# Patient Record
Sex: MALE | Race: WHITE | NOT HISPANIC OR LATINO | Employment: FULL TIME | URBAN - METROPOLITAN AREA
[De-identification: names, ages, dates, MRNs, and addresses within clinical notes are randomized per-mention and may not be internally consistent; named-entity substitution may affect disease eponyms.]

---

## 2017-01-02 ENCOUNTER — OFFICE VISIT (OUTPATIENT)
Dept: URGENT CARE | Facility: PHYSICIAN GROUP | Age: 52
End: 2017-01-02
Payer: COMMERCIAL

## 2017-01-02 VITALS
SYSTOLIC BLOOD PRESSURE: 154 MMHG | TEMPERATURE: 99.7 F | WEIGHT: 248 LBS | DIASTOLIC BLOOD PRESSURE: 92 MMHG | HEART RATE: 86 BPM | OXYGEN SATURATION: 95 % | BODY MASS INDEX: 31 KG/M2 | RESPIRATION RATE: 16 BRPM

## 2017-01-02 DIAGNOSIS — I10 BENIGN HYPERTENSION: ICD-10-CM

## 2017-01-02 PROCEDURE — 99203 OFFICE O/P NEW LOW 30 MIN: CPT | Performed by: PHYSICIAN ASSISTANT

## 2017-01-02 RX ORDER — AMLODIPINE BESYLATE 10 MG/1
10 TABLET ORAL DAILY
Qty: 30 TAB | Refills: 1 | Status: SHIPPED | OUTPATIENT
Start: 2017-01-02 | End: 2017-03-22 | Stop reason: SDUPTHER

## 2017-01-02 NOTE — MR AVS SNAPSHOT
Sanjeev Charles   2017 5:45 PM   Office Visit   MRN: [id number]    Department:  [facility]   Dept Phone:  [phone]    Description:  Male : 1965   Provider:  Rachael Calero PA-C           Reason for Visit     Medication Refill medication refill amlodipine      Allergies as of 2017     No Known Allergies      You were diagnosed with     Benign hypertension   [014019]         Vital Signs     Blood Pressure Pulse Temperature Respirations Weight Oxygen Saturation    154/92 mmHg 86 37.6 °C (99.7 °F) 16 112.492 kg (248 lb) 95%    Smoking Status                   Never Smoker            Basic Information     Date Of Birth Sex Race Ethnicity Preferred Language    1965 Male White Non- English      Health Maintenance     Patient has no pending health maintenance at this time      Current Immunizations     Influenza TIV (IM) 2009  4:22 PM    Pneumococcal polysaccharide vaccine (PPSV-23) 2009  4:28 PM    Tuberculin Skin Test  Incomplete      Below and/or attached are the medications your provider expects you to take. Review all of your home medications and newly ordered medications with your provider and/or pharmacist. Follow medication instructions as directed by your provider and/or pharmacist. Please keep your medication list with you and share with your provider. Update the information when medications are discontinued, doses are changed, or new medications (including over-the-counter products) are added; and carry medication information at all times in the event of emergency situations     Allergies:  No Known Allergies          Medications  Valid as of: 2017 -  6:17 PM    Generic Name Brand Name Tablet Size Instructions for use    Albuterol   Inhale 90 mcg by mouth every four hours as needed. Take 2 puffs every 4 hours as needed         AmLODIPine Besylate (Tab) NORVASC 10 MG Take 10 mg by mouth every day.        AmLODIPine Besylate (Tab) NORVASC  10 MG Take 1 Tab by mouth every day.        GuaiFENesin   Take 600 mg by mouth 2 Times a Day. Take for 5 days         Moxifloxacin HCl (Tab) AVELOX 400 MG Take 400 mg by mouth every day. Take for 12 more days         Oseltamivir Phosphate (Cap) TAMIFLU 75 MG Take 75 mg by mouth 2 times a day. Take for 5 days         Potassium Chloride Dena CR (Tab CR) K-DUR 20 MEQ Take 1 Tab by mouth 2 times a day.        .                 Medicines prescribed today were sent to:     [facility] [address] [place] [address] [address] [id number] [facility] [address]    Phone: [phone] Fax: [phone]    Open 24 Hours?: No      Medication refill instructions:       If your prescription bottle indicates you have medication refills left, it is not necessary to call your provider’s office. Please contact your pharmacy and they will refill your medication.    If your prescription bottle indicates you do not have any refills left, you may request refills at any time through one of the following ways: The online [employer] system (except Urgent Care), by calling your provider’s office, or by asking your pharmacy to contact your provider’s office with a refill request. Medication refills are processed only during regular business hours and may not be available until the next business day. Your provider may request additional information or to have a follow-up visit with you prior to refilling your medication.   *Please Note: Medication refills are assigned a new Rx number when refilled electronically. Your pharmacy may indicate that no refills were authorized even though a new prescription for the same medication is available at the pharmacy. Please request the medicine by name with the pharmacy before contacting your provider for a refill.           [employer] Access Code: [email or web address]  Expires: 2/1/2017  6:17 PM    [employer]  A secure, online tool to manage your health information     [employer]’s [employer]® is a  secure, online tool that connects you to your personalized health information from the privacy of your home -- day or night - making it very easy for you to manage your healthcare. Once the activation process is completed, you can even access your medical information using the [employer] nemo, which is available for free in the Apple Nemo store or Google Play store.     [employer] provides the following levels of access (as shown below):   My Chart Features   [facility] Doctor [facility]  Specialists [facility]  Urgent  Care Non-[facility]  Primary Care  Doctor   Email your healthcare team securely and privately 24/7 X X X    Manage appointments: schedule your next appointment; view details of past/upcoming appointments X      Request prescription refills. X      View recent personal medical records, including lab and immunizations X X X X   View health record, including health history, allergies, medications X X X X   Read reports about your outpatient visits, procedures, consult and ER notes X X X X   See your discharge summary, which is a recap of your hospital and/or ER visit that includes your diagnosis, lab results, and care plan. X X       How to register for [employer]:  1. Go to  [email or web address].  2. Click on the Sign Up Now box, which takes you to the New Member Sign Up page. You will need to provide the following information:  a. Enter your [employer] Access Code exactly as it appears at the top of this page. (You will not need to use this code after you’ve completed the sign-up process. If you do not sign up before the expiration date, you must request a new code.)   b. Enter your date of birth.   c. Enter your home email address.   d. Click Submit, and follow the next screen’s instructions.  3. Create a [employer]finance UKt ID. This will be your [employer]o login ID and cannot be changed, so think of one that is secure and easy to remember.  4. Create a [employer] password. You can change your password at any time.  5. Enter  your Password Reset Question and Answer. This can be used at a later time if you forget your password.   6. Enter your e-mail address. This allows you to receive e-mail notifications when new information is available in [employer].  7. Click Sign Up. You can now view your health information.    For assistance activating your [employer] account, call [phone]

## 2017-01-03 NOTE — PROGRESS NOTES
Chief Complaint   Patient presents with   • Medication Refill     medication refill amlodipine       HISTORY OF PRESENT ILLNESS: Patient is a 51 y.o. male who presents today for refill on Amlodipine.  He was taking 10 mg daily.   He has been off for 6 months.  He states he has been feeling fine overall but at times feels flushed.    No chest pain, no blurred vision.  Patient states he did well on the medication he just ran out and was feeling fine and was unable to get in with Dr. Lance and also would like to establish with new PCP.     There are no active problems to display for this patient.      Allergies:Review of patient's allergies indicates no known allergies.    Current Outpatient Prescriptions Ordered in [facility]   Medication Sig Dispense Refill   • potassium chloride SA (K-DUR) 20 MEQ TBCR Take 1 Tab by mouth 2 times a day. 10 Each 11   • moxifloxacin (AVELOX) 400 MG TABS Take 400 mg by mouth every day. Take for 12 more days      • oseltamivir (TAMIFLU) 75 MG CAPS Take 75 mg by mouth 2 times a day. Take for 5 days      • amlodipine (NORVASC) 10 MG TABS Take 10 mg by mouth every day.     • GuaiFENesin (MUCINEX PO) Take 600 mg by mouth 2 Times a Day. Take for 5 days      • ALBUTEROL INH Inhale 90 mcg by mouth every four hours as needed. Take 2 puffs every 4 hours as needed        No current Epic-ordered facility-administered medications on file.       Past Medical History   Diagnosis Date   • HTN (hypertension)    • Bronchitis    • Pneumonia        Social History   Substance Use Topics   • Smoking status: Never Smoker    • Smokeless tobacco: Never Used   • Alcohol Use: Yes      Comment: 2 beer daily       No family status information on file.   History reviewed. No pertinent family history.    ROS:  Review of Systems   Constitutional: Negative for fever, chills, weight loss and malaise/fatigue.   HENT: Negative for ear pain, nosebleeds, congestion, sore throat and neck pain.    Eyes: Negative for blurred  vision.   Respiratory: Negative for SOB, cough, wheezing.   Cardiovascular: Negative for chest pain, palpitations, orthopnea and leg swelling.   Gastrointestinal: Negative for heartburn, nausea, vomiting and abdominal pain.   All other systems reviewed and are negative.       Exam:  Blood pressure 154/92, pulse 86, temperature 37.6 °C (99.7 °F), resp. rate 16, weight 112.492 kg (248 lb), SpO2 95 %.  General:  Well nourished, well developed male in NAD  Eyes: PERRLA, EOM within normal limits, no conjunctival injection, no scleral icterus, visual fields and acuity grossly intact.  Mouth: reasonable hygiene, no erythema exudates or tonsillar enlargement.  Neck: no masses, range of motion within normal limits, no tracheal deviation. No lymphadenopathy  Pulmonary: Normal respiratory effort, no wheezes, crackles, or rhonchi.  Cardiovascular: regular rate and rhythm without murmurs, rubs, or gallops.  Abdomen: Soft, nontender, nondistended. Normal bowel sounds. No hepatosplenomegaly or masses, or hernias. No rebound or guarding.  Skin: No visible rashes or lesion. Warm, pink, dry.   Extremities: no clubbing, cyanosis, or edema.  Neuro: A&O x 3. Speech normal/clear.  Normal gait.         Assessment/Plan:  1. Benign hypertension  amlodipine (NORVASC) 10 MG Tab       -renewed medication for patient.   -he will establish with PCP in the next month and understands he needs PCP to manage medications.   -recommend regular exercise, limit caffeine, alcohol, salt.   -tobacco cessation      Supportive care, differential diagnoses, and indications for immediate follow-up discussed with patient.   Pathogenesis of diagnosis discussed including typical length and natural progression.   Instructed to return to clinic or nearest emergency department for any change in condition, further concerns, or worsening of symptoms.  Patient states understanding of the plan of care and discharge instructions.  Instructed to make an appointment, for  follow up, with their primary care provider.      Rachael Calero PA-C

## 2017-03-22 ENCOUNTER — OFFICE VISIT (OUTPATIENT)
Dept: MEDICAL GROUP | Facility: PHYSICIAN GROUP | Age: 52
End: 2017-03-22
Payer: COMMERCIAL

## 2017-03-22 ENCOUNTER — HOSPITAL ENCOUNTER (OUTPATIENT)
Dept: LAB | Facility: MEDICAL CENTER | Age: 52
End: 2017-03-22
Attending: NURSE PRACTITIONER
Payer: COMMERCIAL

## 2017-03-22 VITALS
OXYGEN SATURATION: 98 % | TEMPERATURE: 97.9 F | RESPIRATION RATE: 16 BRPM | WEIGHT: 256.6 LBS | DIASTOLIC BLOOD PRESSURE: 72 MMHG | HEART RATE: 70 BPM | HEIGHT: 75 IN | SYSTOLIC BLOOD PRESSURE: 132 MMHG | BODY MASS INDEX: 31.9 KG/M2

## 2017-03-22 DIAGNOSIS — Z13.6 SCREENING FOR CARDIOVASCULAR, RESPIRATORY, AND GENITOURINARY DISEASES: ICD-10-CM

## 2017-03-22 DIAGNOSIS — E66.9 OBESITY (BMI 30-39.9): ICD-10-CM

## 2017-03-22 DIAGNOSIS — Z13.83 SCREENING FOR CARDIOVASCULAR, RESPIRATORY, AND GENITOURINARY DISEASES: ICD-10-CM

## 2017-03-22 DIAGNOSIS — Z13.89 SCREENING FOR CARDIOVASCULAR, RESPIRATORY, AND GENITOURINARY DISEASES: ICD-10-CM

## 2017-03-22 DIAGNOSIS — Z12.5 SCREENING PSA (PROSTATE SPECIFIC ANTIGEN): ICD-10-CM

## 2017-03-22 DIAGNOSIS — I10 BENIGN HYPERTENSION: ICD-10-CM

## 2017-03-22 LAB
ALBUMIN SERPL BCP-MCNC: 4.3 G/DL (ref 3.2–4.9)
ALBUMIN/GLOB SERPL: 1.8 G/DL
ALP SERPL-CCNC: 65 U/L (ref 30–99)
ALT SERPL-CCNC: 21 U/L (ref 2–50)
ANION GAP SERPL CALC-SCNC: 6 MMOL/L (ref 0–11.9)
AST SERPL-CCNC: 17 U/L (ref 12–45)
BILIRUB SERPL-MCNC: 0.7 MG/DL (ref 0.1–1.5)
BUN SERPL-MCNC: 11 MG/DL (ref 8–22)
CALCIUM SERPL-MCNC: 9.6 MG/DL (ref 8.5–10.5)
CHLORIDE SERPL-SCNC: 106 MMOL/L (ref 96–112)
CHOLEST SERPL-MCNC: 158 MG/DL (ref 100–199)
CO2 SERPL-SCNC: 27 MMOL/L (ref 20–33)
CREAT SERPL-MCNC: 0.95 MG/DL (ref 0.5–1.4)
GLOBULIN SER CALC-MCNC: 2.4 G/DL (ref 1.9–3.5)
GLUCOSE SERPL-MCNC: 89 MG/DL (ref 65–99)
HDLC SERPL-MCNC: 33 MG/DL
LDLC SERPL CALC-MCNC: 114 MG/DL
POTASSIUM SERPL-SCNC: 4.4 MMOL/L (ref 3.6–5.5)
PROT SERPL-MCNC: 6.7 G/DL (ref 6–8.2)
PSA SERPL DL<=0.01 NG/ML-MCNC: 1.09 NG/ML (ref 0–4)
SODIUM SERPL-SCNC: 139 MMOL/L (ref 135–145)
TRIGL SERPL-MCNC: 56 MG/DL (ref 0–149)

## 2017-03-22 PROCEDURE — 80061 LIPID PANEL: CPT

## 2017-03-22 PROCEDURE — 80053 COMPREHEN METABOLIC PANEL: CPT

## 2017-03-22 PROCEDURE — 36415 COLL VENOUS BLD VENIPUNCTURE: CPT

## 2017-03-22 PROCEDURE — 84153 ASSAY OF PSA TOTAL: CPT

## 2017-03-22 PROCEDURE — 99214 OFFICE O/P EST MOD 30 MIN: CPT | Performed by: NURSE PRACTITIONER

## 2017-03-22 RX ORDER — AMLODIPINE BESYLATE 10 MG/1
10 TABLET ORAL DAILY
Qty: 30 TAB | Refills: 5 | Status: SHIPPED | OUTPATIENT
Start: 2017-03-22 | End: 2017-10-13 | Stop reason: SDUPTHER

## 2017-03-22 ASSESSMENT — PATIENT HEALTH QUESTIONNAIRE - PHQ9: CLINICAL INTERPRETATION OF PHQ2 SCORE: 0

## 2017-03-22 NOTE — PROGRESS NOTES
Subjective:     Chief Complaint   Patient presents with   • Establish Care   • Medication Refill     Amlodipine        HPI:  Sanjeev Charles is a 51 y.o. male here today to discuss the following:    Obesity (BMI 30-39.9)  Patient is obese with a BMI of 32.07. He is currently out of work and not effective is normal, but states he'll be going back to work soon he is just in between jobs.    Benign hypertension  Chronic condition: Patient has been treated for hypertension for many years.He  is currently taking Norvasc 10 mg. He has been out of medication for 2 weeks and is questioning whether or not he needs to be on the medication. He denies any chest pain, diaphoresis, shortness of breath, headaches, dizziness or blurred vision.          Current medicines (including changes today)  Current Outpatient Prescriptions   Medication Sig Dispense Refill   • amlodipine (NORVASC) 10 MG Tab Take 1 Tab by mouth every day. 30 Tab 5   • potassium chloride SA (K-DUR) 20 MEQ TBCR Take 1 Tab by mouth 2 times a day. 10 Each 11   • moxifloxacin (AVELOX) 400 MG TABS Take 400 mg by mouth every day. Take for 12 more days      • oseltamivir (TAMIFLU) 75 MG CAPS Take 75 mg by mouth 2 times a day. Take for 5 days      • amlodipine (NORVASC) 10 MG TABS Take 10 mg by mouth every day.     • GuaiFENesin (MUCINEX PO) Take 600 mg by mouth 2 Times a Day. Take for 5 days      • ALBUTEROL INH Inhale 90 mcg by mouth every four hours as needed. Take 2 puffs every 4 hours as needed        No current facility-administered medications for this visit.       He  has a past medical history of HTN (hypertension); Bronchitis; and Pneumonia. He also has no past medical history of Unspecified hemorrhagic conditions (CMS-HCC), Personal history of venous thrombosis and embolism, Cancer (CMS-HCC), Diabetes, Infectious disease, Unspecified disorder of thyroid, Psychiatric problem, Glaucoma, CATARACT, Stroke (CMS-[facility]), Seizure (CMS-HCC), Congestive heart failure  "(CMS-[facility]), Myocardial infarct (CMS-HCC), Arrhythmia, Pacemaker, Angina, Heart valve disease, Heart murmur, Rheumatic fever, ASTHMA, COPD, Jaundice, Indigestion, Unspecified urinary incontinence, Renal disorder, Dialysis, Other specified symptom associated with female genital organs, Arthritis, or Backpain.    ROS   Review of Systems   Constitutional: Negative for fever, chills, weight loss and malaise/fatigue.   HENT: Negative for ear pain, nosebleeds, congestion, sore throat and neck pain.    Respiratory: Negative for cough, sputum production, shortness of breath and wheezing.    Cardiovascular: Negative for chest pain, palpitations,  and leg swelling.   Gastrointestinal: Negative for heartburn, nausea, vomiting, diarrhea and abdominal pain.   Neurological: Negative for dizziness, tingling, tremors, sensory change, focal weakness and headaches.   Psychiatric/Behavioral: Negative for depression, anxiety, suicidal ideas, insomnia and memory loss.    All other systems reviewed and are negative except as in HPI.     Objective:   Physical Exam:  Blood pressure 132/72, pulse 70, temperature 36.6 °C (97.9 °F), resp. rate 16, height 1.905 m (6' 3\"), weight 116.393 kg (256 lb 9.6 oz), SpO2 98 %. Body mass index is 32.07 kg/(m^2).   Physical Exam:  Alert, oriented in no acute distress.  Eye contact is good, speech goal directed, affect calm  HEENT: conjunctiva non-injected, sclera non-icteric.  Pinna normal.   Neck No adenopathy or masses in the neck or supraclavicular regions.  Lungs: clear to auscultation bilaterally with good excursion.  CV: regular rate and rhythm.   Abdomen: soft, nontender, No CVAT. Normal bowel sounds.  Ext: no edema, color normal, vascularity normal, temperature normal  MS: Normal gait and station    Assessment and Plan:   The following treatment plan was discussed   1. Benign hypertension  amlodipine (NORVASC) 10 MG Tab    Continue Norvasc.   2. Screening for cardiovascular, respiratory, and " genitourinary diseases  COMP METABOLIC PANEL    LIPID PROFILE    Labs ordered.   3. Screening PSA (prostate specific antigen)  PROSTATE SPECIFIC AG SCREENING    Labs ordered.   4. Obesity (BMI 30-39.9)  Patient identified as having weight management issue.  Appropriate orders and counseling given.    Discussed diet and provided handout       Followup: Return in about 1 year (around 3/22/2018) for HTN/Lipid.   Please note that this dictation was created using voice recognition software. I have made every reasonable attempt to correct obvious errors, but I expect that there are errors of grammar and possibly content that I did not discover before finalizing the note.

## 2017-03-22 NOTE — ASSESSMENT & PLAN NOTE
Patient is obese with a BMI of 32.07. He is currently out of work and not effective is normal, but states he'll be going back to work soon he is just in between jobs.

## 2017-03-22 NOTE — PATIENT INSTRUCTIONS
For adults, the American Heart Association recommends:    Consume a dietary pattern that emphasizes intake of vegetables, fruits, and whole grains; includes low-fat dairy products, poultry, fish, legumes, nontropical vegetable oils and nuts; and limits intake of sweets, sugar-sweetened beverages and red meats.  Look up DASH diet for more information.    Aim for a dietary pattern that achieves 5% to 6% of calories from saturated fat.     Reduce/eliminate percent of calories from trans fat     Consume no more than 2,400 mg of sodium/day     Engage in aerobic physical activity, 3 to 4 sessions a week, lasting on average 40 minutes per session, and involving moderate-to-vigorous intensity physical activity.           Managing Your High Blood Pressure  Blood pressure is a measurement of how forceful your blood is pressing against the walls of the arteries. Arteries are muscular tubes within the circulatory system. Blood pressure does not stay the same. Blood pressure rises when you are active, excited, or nervous; and it lowers during sleep and relaxation. If the numbers measuring your blood pressure stay above normal most of the time, you are at risk for health problems. High blood pressure (hypertension) is a long-term (chronic) condition in which blood pressure is elevated.  A blood pressure reading is recorded as two numbers, such as 120 over 80 (or 120/80). The first, higher number is called the systolic pressure. It is a measure of the pressure in your arteries as the heart beats. The second, lower number is called the diastolic pressure. It is a measure of the pressure in your arteries as the heart relaxes between beats.   Keeping your blood pressure in a normal range is important to your overall health and prevention of health problems, such as heart disease and stroke. When your blood pressure is uncontrolled, your heart has to work harder than normal. High blood pressure is a very common condition in adults  because blood pressure tends to rise with age. Men and women are equally likely to have hypertension but at different times in life. Before age 45, men are more likely to have hypertension. After 65 years of age, women are more likely to have it. Hypertension is especially common in  Americans. This condition often has no signs or symptoms. The cause of the condition is usually not known. Your caregiver can help you come up with a plan to keep your blood pressure in a normal, healthy range.  BLOOD PRESSURE STAGES  Blood pressure is classified into four stages: normal, prehypertension, stage 1, and stage 2. Your blood pressure reading will be used to determine what type of treatment, if any, is necessary. Appropriate treatment options are tied to these four stages:   Normal  · Systolic pressure (mm Hg): below 120.  · Diastolic pressure (mm Hg): below 80.  Prehypertension  · Systolic pressure (mm Hg): 120 to 139.  · Diastolic pressure (mm Hg): 80 to 89.  Stage 1  · Systolic pressure (mm Hg): 140 to 159.  · Diastolic pressure (mm Hg): 90 to 99.  Stage 2  · Systolic pressure (mm Hg): 160 or above.  · Diastolic pressure (mm Hg): 100 or above.  RISKS RELATED TO HIGH BLOOD PRESSURE  Managing your blood pressure is an important responsibility. Uncontrolled high blood pressure can lead to:  · A heart attack.  · A stroke.  · A weakened blood vessel (aneurysm).  · Heart failure.  · Kidney damage.  · Eye damage.  · Metabolic syndrome.  · Memory and concentration problems.  HOW TO MANAGE YOUR BLOOD PRESSURE  Blood pressure can be managed effectively with lifestyle changes and medicines (if needed). Your caregiver will help you come up with a plan to bring your blood pressure within a normal range. Your plan should include the following:  Education  · Read all information provided by your caregivers about how to control blood pressure.  · Educate yourself on the latest guidelines and treatment recommendations. New research  is always being done to further define the risks and treatments for high blood pressure.  Lifestyle changes  · Control your weight.  · Avoid smoking.  · Stay physically active.  · Reduce the amount of salt in your diet.  · Reduce stress.  · Control any chronic conditions, such as high cholesterol or diabetes.  · Reduce your alcohol intake.  Medicines  · Several medicines (antihypertensive medicines) are available, if needed, to bring blood pressure within a normal range.  Communication  · Review all the medicines you take with your caregiver because there may be side effects or interactions.  · Talk with your caregiver about your diet, exercise habits, and other lifestyle factors that may be contributing to high blood pressure.  · See your caregiver regularly. Your caregiver can help you create and adjust your plan for managing high blood pressure.  RECOMMENDATIONS FOR TREATMENT AND FOLLOW-UP   The following recommendations are based on current guidelines for managing high blood pressure in nonpregnant adults. Use these recommendations to identify the proper follow-up period or treatment option based on your blood pressure reading. You can discuss these options with your caregiver.  · Systolic pressure of 120 to 139 or diastolic pressure of 80 to 89: Follow up with your caregiver as directed.  · Systolic pressure of 140 to 160 or diastolic pressure of 90 to 100: Follow up with your caregiver within 2 months.  · Systolic pressure above 160 or diastolic pressure above 100: Follow up with your caregiver within 1 month.  · Systolic pressure above 180 or diastolic pressure above 110: Consider antihypertensive therapy; follow up with your caregiver within 1 week.  · Systolic pressure above 200 or diastolic pressure above 120: Begin antihypertensive therapy; follow up with your caregiver within 1 week.     This information is not intended to replace advice given to you by your health care provider. Make sure you discuss  any questions you have with your health care provider.     Document Released: 09/11/2013 Document Reviewed: 09/11/2013  [employer] Interactive Patient Education ©2016 [employer]

## 2017-03-22 NOTE — ASSESSMENT & PLAN NOTE
Chronic condition: Patient has been treated for hypertension for many years.He  is currently taking Norvasc 10 mg. He has been out of medication for 2 weeks and is questioning whether or not he needs to be on the medication. He denies any chest pain, diaphoresis, shortness of breath, headaches, dizziness or blurred vision.

## 2017-10-13 DIAGNOSIS — I10 BENIGN HYPERTENSION: ICD-10-CM

## 2017-10-13 RX ORDER — AMLODIPINE BESYLATE 10 MG/1
TABLET ORAL
Qty: 90 TAB | Refills: 0 | Status: SHIPPED | OUTPATIENT
Start: 2017-10-13 | End: 2018-01-29 | Stop reason: SDUPTHER

## 2017-10-13 NOTE — TELEPHONE ENCOUNTER
Refill X 3 months, sent to pharmacy.  Pt not seen in the last 6 months per protocol.   Lab Results   Component Value Date/Time    SODIUM 139 03/22/2017 09:11 AM    POTASSIUM 4.4 03/22/2017 09:11 AM    CHLORIDE 106 03/22/2017 09:11 AM    CO2 27 03/22/2017 09:11 AM    GLUCOSE 89 03/22/2017 09:11 AM    BUN 11 03/22/2017 09:11 AM    CREATININE 0.95 03/22/2017 09:11 AM       Silverio Hays, SarahiD

## 2017-10-13 NOTE — TELEPHONE ENCOUNTER
Was the patient seen in the last year in this department? Yes     Does patient have an active prescription for medications requested? No     Received Request Via: Pharmacy      Pt met protocol?: No Last OV 03/2017   BP Readings from Last 1 Encounters:   03/22/17 132/72

## 2017-11-29 ENCOUNTER — TELEPHONE (OUTPATIENT)
Dept: MEDICAL GROUP | Facility: PHYSICIAN GROUP | Age: 52
End: 2017-11-29

## 2017-11-29 NOTE — TELEPHONE ENCOUNTER
Pt came in to the office today with complete disrespect. He went up to the check in desk with Mary and told her he made an appointment for today and was checking in. He stated he made the appointment through my-chart and since his wife's phone broke he wasn't able to show the confirmation.   Mary had came back to discuss this with me- I went up front to talk the pt to find out what was going on. The Pt stated he was here for an appointment that he made through my-chart, he was told to come in within 3 months for a medication follow up. That he took the entire day off and I was going to reimburse him for his $400 he missed out on taking the entire day off for his appointment. I stated I was not going to reimburse him and that there was no appointment scheduled. He stated he would just like to talk to Gricelda and say hi and let her know he was doing fine and well, I replied I would inform her of the message. He continued with an attitude and saying what a waist of time this way and that it was our mistake and he would be reimbursed. He asked for the billing department information so he would be reimbursed. He started to yell at me so I said I was not going to continue with this conversation since he was being so disrespectful and that  he could talk to my manager for further inquiries.   Once I had walked away I was told the Pt used inapropiate language and called me an effin-B(with the actual language).     Just an MAGDI

## 2018-01-29 DIAGNOSIS — I10 BENIGN HYPERTENSION: ICD-10-CM

## 2018-01-30 RX ORDER — AMLODIPINE BESYLATE 10 MG/1
TABLET ORAL
Qty: 90 TAB | Refills: 0 | Status: SHIPPED | OUTPATIENT
Start: 2018-01-30 | End: 2018-05-23 | Stop reason: SDUPTHER

## 2018-01-30 NOTE — TELEPHONE ENCOUNTER
Last seen by PCP 3/17. Will send 3 months to pharmacy. Patient is due for an appointment. Please schedule.

## 2018-01-30 NOTE — TELEPHONE ENCOUNTER
*PT NEEDS TO ESTABLISH WITH NEW PCP, MAKE APPT, AND GET LABS DONE SOON*  Was the patient seen in the last year in this department? Yes     Does patient have an active prescription for medications requested? No     Received Request Via: Pharmacy      Pt met protocol?: Yes    LAST OV 03/22/2017    BP Readings from Last 1 Encounters:   03/22/17 132/72     Lab Results   Component Value Date/Time    CHOLSTRLTOT 158 03/22/2017 09:11 AM     (H) 03/22/2017 09:11 AM    HDL 33 (A) 03/22/2017 09:11 AM    TRIGLYCERIDE 56 03/22/2017 09:11 AM       Lab Results   Component Value Date/Time    SODIUM 139 03/22/2017 09:11 AM    POTASSIUM 4.4 03/22/2017 09:11 AM    CHLORIDE 106 03/22/2017 09:11 AM    CO2 27 03/22/2017 09:11 AM    GLUCOSE 89 03/22/2017 09:11 AM    BUN 11 03/22/2017 09:11 AM    CREATININE 0.95 03/22/2017 09:11 AM     Lab Results   Component Value Date/Time    ALKPHOSPHAT 65 03/22/2017 09:11 AM    ASTSGOT 17 03/22/2017 09:11 AM    ALTSGPT 21 03/22/2017 09:11 AM    TBILIRUBIN 0.7 03/22/2017 09:11 AM

## 2018-07-23 ENCOUNTER — TELEPHONE (OUTPATIENT)
Dept: MEDICAL GROUP | Facility: PHYSICIAN GROUP | Age: 53
End: 2018-07-23

## 2018-07-23 NOTE — TELEPHONE ENCOUNTER
----- Message -----   From: Sanjeev Charles   Sent: 7/21/2018   7:11 AM   To: Dwayne Rivas   Subject: Appointment Request                                Topic: Technical Quality      I need to schedule an appointment ASAP for my blood pressure medication. I’m available after 3:00 weekdays.  Please advise. I’m completely out of medicine.    Thank You!      Sanjeev

## 2018-07-30 ENCOUNTER — OFFICE VISIT (OUTPATIENT)
Dept: URGENT CARE | Facility: PHYSICIAN GROUP | Age: 53
End: 2018-07-30
Payer: COMMERCIAL

## 2018-07-30 VITALS
HEART RATE: 76 BPM | SYSTOLIC BLOOD PRESSURE: 148 MMHG | DIASTOLIC BLOOD PRESSURE: 100 MMHG | BODY MASS INDEX: 31.95 KG/M2 | TEMPERATURE: 98.9 F | OXYGEN SATURATION: 97 % | RESPIRATION RATE: 16 BRPM | HEIGHT: 75 IN | WEIGHT: 257 LBS

## 2018-07-30 DIAGNOSIS — I48.91 ATRIAL FIBRILLATION WITH RVR (HCC): ICD-10-CM

## 2018-07-30 DIAGNOSIS — I10 BENIGN HYPERTENSION: ICD-10-CM

## 2018-07-30 PROCEDURE — 99214 OFFICE O/P EST MOD 30 MIN: CPT | Performed by: FAMILY MEDICINE

## 2018-07-30 RX ORDER — AMLODIPINE BESYLATE 10 MG/1
10 TABLET ORAL DAILY
Qty: 30 TAB | Refills: 0 | Status: SHIPPED | OUTPATIENT
Start: 2018-07-30 | End: 2018-08-29

## 2018-07-30 RX ORDER — AMLODIPINE BESYLATE 5 MG/1
5 TABLET ORAL DAILY
COMMUNITY
End: 2019-03-25

## 2018-07-31 ASSESSMENT — ENCOUNTER SYMPTOMS
COUGH: 0
PALPITATIONS: 0
CHILLS: 0
WEIGHT LOSS: 0
SPUTUM PRODUCTION: 0
DIAPHORESIS: 0
FEVER: 0
NAUSEA: 0
WHEEZING: 0
HEMOPTYSIS: 0

## 2018-07-31 NOTE — PROGRESS NOTES
"Subjective:      Sanjeev Charles is a 52 y.o. male who presents with Shortness of Breath (SOB, been without blood pressure med's for, eye twitching [id number] )            Patient with past medical history of hypertension presents for refill of medication.  He has been controlled on 10 mg of amlodipine.  He does get intermittent mild leg swelling.  Over the last 3 weeks he has had intermittent shortness of breath.  No chest pain.  He has no past medical history of cardiac problems.  No orthopnea.  Symptoms are moderate severity.  No aggravating or alleviating factors.        Review of Systems   Constitutional: Negative for chills, diaphoresis, fever, malaise/fatigue and weight loss.   Respiratory: Negative for cough, hemoptysis, sputum production and wheezing.    Cardiovascular: Negative for palpitations.   Gastrointestinal: Negative for nausea.     .  Medications, Allergies, and current problem list reviewed today in Epic       Objective:     /100   Pulse 76   Temp 37.2 °C (98.9 °F)   Resp 16   Ht 1.905 m (6' 3\")   Wt 116.6 kg (257 lb)   SpO2 97%   BMI 32.12 kg/m²      Physical Exam   Constitutional: He is oriented to person, place, and time. He appears well-developed and well-nourished. No distress.   HENT:   Head: Atraumatic.   Eyes: Conjunctivae are normal.   Neck: Neck supple. No JVD present.   Cardiovascular:   Rapid, irregularly irregular   Pulmonary/Chest: Effort normal and breath sounds normal. He has no wheezes.   Musculoskeletal: He exhibits edema ( Trace). He exhibits no tenderness.   Neurological: He is alert and oriented to person, place, and time.   Skin: Skin is warm and dry. No rash noted.               Assessment/Plan:   Ekg: Atrial fibrillation rate 120s    1. Benign hypertension  amLODIPine (NORVASC) 10 MG Tab    EKG   2. Atrial fibrillation with RVR (HCC)  REFERRAL TO CARDIOLOGY     AMA/Refusal of Treatment     Differential diagnosis, natural history, supportive care, and indications " for immediate follow-up discussed at length.     Case was discussed with  To cardiology.  He recommends holding anticoagulation until patient follows up in cardiology office given unknown renal status and concern about compliance.

## 2018-08-29 ENCOUNTER — TELEPHONE (OUTPATIENT)
Dept: MEDICAL GROUP | Facility: PHYSICIAN GROUP | Age: 53
End: 2018-08-29

## 2018-08-29 DIAGNOSIS — I10 BENIGN HYPERTENSION: ICD-10-CM

## 2018-08-29 RX ORDER — AMLODIPINE BESYLATE 10 MG/1
TABLET ORAL
Qty: 90 TAB | Refills: 0 | Status: SHIPPED | OUTPATIENT
Start: 2018-08-29 | End: 2019-03-22 | Stop reason: SDUPTHER

## 2018-08-30 ENCOUNTER — TELEPHONE (OUTPATIENT)
Dept: URGENT CARE | Facility: PHYSICIAN GROUP | Age: 53
End: 2018-08-30

## 2018-08-30 NOTE — TELEPHONE ENCOUNTER
----- Message from Blayne Kulkarni sent at 8/29/2018 12:54 PM PDT -----  Hi Dr. Wade,    Would you be willing to see this patient tomorrow for a refill on Hypertension medication? I will schedule him an appointment to establish care but he says he is down to 3 pills and had to cancel his establishing appointment yesterday. He has not been seen in over a year. Please advise.

## 2018-10-28 ENCOUNTER — HOSPITAL ENCOUNTER (EMERGENCY)
Facility: MEDICAL CENTER | Age: 53
End: 2018-10-28
Attending: EMERGENCY MEDICINE
Payer: COMMERCIAL

## 2018-10-28 VITALS
DIASTOLIC BLOOD PRESSURE: 94 MMHG | SYSTOLIC BLOOD PRESSURE: 135 MMHG | BODY MASS INDEX: 29.84 KG/M2 | WEIGHT: 240 LBS | HEIGHT: 75 IN | HEART RATE: 91 BPM | OXYGEN SATURATION: 96 % | RESPIRATION RATE: 18 BRPM | TEMPERATURE: 97.3 F
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
## 2018-10-28 DIAGNOSIS — Z00.8 MEDICAL CLEARANCE FOR INCARCERATION: ICD-10-CM

## 2018-10-28 PROCEDURE — 99284 EMERGENCY DEPT VISIT MOD MDM: CPT

## 2018-10-28 ASSESSMENT — PAIN SCALES - GENERAL: PAINLEVEL_OUTOF10: 0

## 2018-10-28 NOTE — ED NOTES
Reviewed discharge instructions, pt verbalized understanding of instructions. Pt medically cleared for d/c.
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
## 2019-03-22 DIAGNOSIS — I10 BENIGN HYPERTENSION: ICD-10-CM

## 2019-03-22 NOTE — TELEPHONE ENCOUNTER
Was the patient seen in the last year in this department? No     Does patient have an active prescription for medications requested? No     Received Request Via: Pharmacy      Pt met protocol?: No, bp as of 7/30/19 148/100, appt to est with Héctor Diaz 5/19

## 2019-03-23 RX ORDER — AMLODIPINE BESYLATE 10 MG/1
TABLET ORAL
Qty: 90 TAB | Refills: 0 | Status: SHIPPED | OUTPATIENT
Start: 2019-03-23 | End: 2019-03-25

## 2019-03-25 ENCOUNTER — OFFICE VISIT (OUTPATIENT)
Dept: URGENT CARE | Facility: CLINIC | Age: 54
End: 2019-03-25
Payer: COMMERCIAL

## 2019-03-25 VITALS
DIASTOLIC BLOOD PRESSURE: 96 MMHG | WEIGHT: 257 LBS | HEART RATE: 58 BPM | BODY MASS INDEX: 31.95 KG/M2 | SYSTOLIC BLOOD PRESSURE: 140 MMHG | RESPIRATION RATE: 16 BRPM | OXYGEN SATURATION: 97 % | HEIGHT: 75 IN | TEMPERATURE: 97.8 F

## 2019-03-25 DIAGNOSIS — I10 ESSENTIAL HYPERTENSION: ICD-10-CM

## 2019-03-25 DIAGNOSIS — J22 LRTI (LOWER RESPIRATORY TRACT INFECTION): ICD-10-CM

## 2019-03-25 PROCEDURE — 99214 OFFICE O/P EST MOD 30 MIN: CPT | Performed by: FAMILY MEDICINE

## 2019-03-25 RX ORDER — AMLODIPINE BESYLATE 10 MG/1
10 TABLET ORAL DAILY
Qty: 60 TAB | Refills: 0 | Status: SHIPPED | OUTPATIENT
Start: 2019-03-25 | End: 2019-05-06 | Stop reason: SDUPTHER

## 2019-03-25 RX ORDER — AZITHROMYCIN 250 MG/1
TABLET, FILM COATED ORAL
Qty: 6 TAB | Refills: 0 | Status: SHIPPED | OUTPATIENT
Start: 2019-03-25 | End: 2019-05-06

## 2019-03-25 ASSESSMENT — ENCOUNTER SYMPTOMS
SHORTNESS OF BREATH: 1
VOMITING: 0
WHEEZING: 0
FEVER: 0
LEG PAIN: 0

## 2019-03-25 NOTE — PROGRESS NOTES
"Subjective:   Sanjeev Charles is a 53 y.o. male who presents for Shortness of Breath (x1 day, thinks it might be because he hasn't taken BP meds in 3 months)        Shortness of Breath   This is a new problem. The current episode started yesterday. The problem occurs constantly. The problem has been gradually worsening. Pertinent negatives include no fever, leg pain, vomiting or wheezing.     Review of Systems   Constitutional: Negative for fever.   Respiratory: Positive for shortness of breath. Negative for wheezing.    Gastrointestinal: Negative for vomiting.     No Known Allergies   Objective:   /96   Pulse (!) 58   Temp 36.6 °C (97.8 °F)   Resp 16   Ht 1.905 m (6' 3\")   Wt 116.6 kg (257 lb)   SpO2 97%   BMI 32.12 kg/m²   Physical Exam   Constitutional: He is oriented to person, place, and time. He appears well-developed and well-nourished. No distress.   HENT:   Head: Normocephalic and atraumatic.   Eyes: Pupils are equal, round, and reactive to light. Conjunctivae and EOM are normal.   Cardiovascular: Normal rate and regular rhythm.    No murmur heard.  Pulmonary/Chest: Effort normal. No respiratory distress. He has wheezes. He has no rales.   Abdominal: Soft. He exhibits no distension. There is no tenderness.   Neurological: He is alert and oriented to person, place, and time. He has normal reflexes. No sensory deficit.   Skin: Skin is warm and dry.   Psychiatric: He has a normal mood and affect.         Assessment/Plan:   1. LRTI (lower respiratory tract infection)  - azithromycin (ZITHROMAX) 250 MG Tab; Take 2 tablets by mouth on day one. Take one tablet by mouth the remaining days until gone  Dispense: 6 Tab; Refill: 0    2. Essential hypertension  - amLODIPine (NORVASC) 10 MG Tab; Take 1 Tab by mouth every day.  Dispense: 60 Tab; Refill: 0    Differential diagnosis, natural history, supportive care, and indications for immediate follow-up discussed.       "

## 2019-03-25 NOTE — PATIENT INSTRUCTIONS

## 2019-05-06 ENCOUNTER — OFFICE VISIT (OUTPATIENT)
Dept: MEDICAL GROUP | Facility: PHYSICIAN GROUP | Age: 54
End: 2019-05-06
Payer: COMMERCIAL

## 2019-05-06 ENCOUNTER — HOSPITAL ENCOUNTER (OUTPATIENT)
Dept: LAB | Facility: MEDICAL CENTER | Age: 54
End: 2019-05-06
Attending: FAMILY MEDICINE
Payer: COMMERCIAL

## 2019-05-06 VITALS
DIASTOLIC BLOOD PRESSURE: 84 MMHG | HEIGHT: 75 IN | WEIGHT: 232 LBS | HEART RATE: 68 BPM | TEMPERATURE: 98.2 F | RESPIRATION RATE: 12 BRPM | OXYGEN SATURATION: 97 % | BODY MASS INDEX: 28.85 KG/M2 | SYSTOLIC BLOOD PRESSURE: 126 MMHG

## 2019-05-06 DIAGNOSIS — E53.8 VITAMIN B12 DEFICIENCY: ICD-10-CM

## 2019-05-06 DIAGNOSIS — I48.91 ATRIAL FIBRILLATION, UNSPECIFIED TYPE (HCC): ICD-10-CM

## 2019-05-06 DIAGNOSIS — Z78.9 ALCOHOL USE: ICD-10-CM

## 2019-05-06 DIAGNOSIS — I10 ESSENTIAL HYPERTENSION: ICD-10-CM

## 2019-05-06 DIAGNOSIS — Z51.81 MEDICATION MONITORING ENCOUNTER: ICD-10-CM

## 2019-05-06 DIAGNOSIS — G47.30 SLEEP APNEA, UNSPECIFIED TYPE: ICD-10-CM

## 2019-05-06 DIAGNOSIS — R73.9 HYPERGLYCEMIA: ICD-10-CM

## 2019-05-06 DIAGNOSIS — E78.5 DYSLIPIDEMIA: ICD-10-CM

## 2019-05-06 DIAGNOSIS — Z11.3 ROUTINE SCREENING FOR STI (SEXUALLY TRANSMITTED INFECTION): ICD-10-CM

## 2019-05-06 DIAGNOSIS — R53.83 FATIGUE, UNSPECIFIED TYPE: ICD-10-CM

## 2019-05-06 DIAGNOSIS — E55.9 VITAMIN D DEFICIENCY: ICD-10-CM

## 2019-05-06 DIAGNOSIS — I49.9 IRREGULAR HEART BEAT: ICD-10-CM

## 2019-05-06 DIAGNOSIS — Z23 NEED FOR VACCINATION: ICD-10-CM

## 2019-05-06 DIAGNOSIS — F17.200 SMOKER: ICD-10-CM

## 2019-05-06 DIAGNOSIS — I10 BENIGN HYPERTENSION: ICD-10-CM

## 2019-05-06 PROBLEM — F10.90 ALCOHOL USE: Status: ACTIVE | Noted: 2019-05-06

## 2019-05-06 PROBLEM — E66.9 OBESITY (BMI 30-39.9): Status: RESOLVED | Noted: 2017-03-22 | Resolved: 2019-05-06

## 2019-05-06 LAB
25(OH)D3 SERPL-MCNC: 20 NG/ML (ref 30–100)
ALBUMIN SERPL BCP-MCNC: 4.3 G/DL (ref 3.2–4.9)
ALBUMIN/GLOB SERPL: 1.9 G/DL
ALP SERPL-CCNC: 79 U/L (ref 30–99)
ALT SERPL-CCNC: 21 U/L (ref 2–50)
ANION GAP SERPL CALC-SCNC: 9 MMOL/L (ref 0–11.9)
AST SERPL-CCNC: 24 U/L (ref 12–45)
BASOPHILS # BLD AUTO: 0.5 % (ref 0–1.8)
BASOPHILS # BLD: 0.04 K/UL (ref 0–0.12)
BILIRUB SERPL-MCNC: 1.1 MG/DL (ref 0.1–1.5)
BUN SERPL-MCNC: 14 MG/DL (ref 8–22)
CALCIUM SERPL-MCNC: 9.2 MG/DL (ref 8.5–10.5)
CHLORIDE SERPL-SCNC: 106 MMOL/L (ref 96–112)
CHOLEST SERPL-MCNC: 197 MG/DL (ref 100–199)
CO2 SERPL-SCNC: 24 MMOL/L (ref 20–33)
CREAT SERPL-MCNC: 0.95 MG/DL (ref 0.5–1.4)
EOSINOPHIL # BLD AUTO: 0.27 K/UL (ref 0–0.51)
EOSINOPHIL NFR BLD: 3.3 % (ref 0–6.9)
ERYTHROCYTE [DISTWIDTH] IN BLOOD BY AUTOMATED COUNT: 45.9 FL (ref 35.9–50)
FASTING STATUS PATIENT QL REPORTED: NORMAL
GLOBULIN SER CALC-MCNC: 2.3 G/DL (ref 1.9–3.5)
GLUCOSE SERPL-MCNC: 96 MG/DL (ref 65–99)
HCT VFR BLD AUTO: 51 % (ref 42–52)
HDLC SERPL-MCNC: 61 MG/DL
HGB BLD-MCNC: 16.7 G/DL (ref 14–18)
IMM GRANULOCYTES # BLD AUTO: 0.03 K/UL (ref 0–0.11)
IMM GRANULOCYTES NFR BLD AUTO: 0.4 % (ref 0–0.9)
LDLC SERPL CALC-MCNC: 118 MG/DL
LYMPHOCYTES # BLD AUTO: 2.61 K/UL (ref 1–4.8)
LYMPHOCYTES NFR BLD: 31.6 % (ref 22–41)
MCH RBC QN AUTO: 31 PG (ref 27–33)
MCHC RBC AUTO-ENTMCNC: 32.7 G/DL (ref 33.7–35.3)
MCV RBC AUTO: 94.8 FL (ref 81.4–97.8)
MONOCYTES # BLD AUTO: 0.5 K/UL (ref 0–0.85)
MONOCYTES NFR BLD AUTO: 6 % (ref 0–13.4)
NEUTROPHILS # BLD AUTO: 4.82 K/UL (ref 1.82–7.42)
NEUTROPHILS NFR BLD: 58.2 % (ref 44–72)
NRBC # BLD AUTO: 0 K/UL
NRBC BLD-RTO: 0 /100 WBC
PLATELET # BLD AUTO: 222 K/UL (ref 164–446)
PMV BLD AUTO: 10.5 FL (ref 9–12.9)
POTASSIUM SERPL-SCNC: 3.6 MMOL/L (ref 3.6–5.5)
PROT SERPL-MCNC: 6.6 G/DL (ref 6–8.2)
RBC # BLD AUTO: 5.38 M/UL (ref 4.7–6.1)
SODIUM SERPL-SCNC: 139 MMOL/L (ref 135–145)
TRIGL SERPL-MCNC: 92 MG/DL (ref 0–149)
TSH SERPL DL<=0.005 MIU/L-ACNC: 0.94 UIU/ML (ref 0.38–5.33)
VIT B12 SERPL-MCNC: 236 PG/ML (ref 211–911)
WBC # BLD AUTO: 8.3 K/UL (ref 4.8–10.8)

## 2019-05-06 PROCEDURE — 87389 HIV-1 AG W/HIV-1&-2 AB AG IA: CPT

## 2019-05-06 PROCEDURE — 90471 IMMUNIZATION ADMIN: CPT | Performed by: FAMILY MEDICINE

## 2019-05-06 PROCEDURE — 36415 COLL VENOUS BLD VENIPUNCTURE: CPT

## 2019-05-06 PROCEDURE — 86780 TREPONEMA PALLIDUM: CPT

## 2019-05-06 PROCEDURE — 83036 HEMOGLOBIN GLYCOSYLATED A1C: CPT

## 2019-05-06 PROCEDURE — 93000 ELECTROCARDIOGRAM COMPLETE: CPT | Performed by: FAMILY MEDICINE

## 2019-05-06 PROCEDURE — 82607 VITAMIN B-12: CPT

## 2019-05-06 PROCEDURE — 82306 VITAMIN D 25 HYDROXY: CPT

## 2019-05-06 PROCEDURE — 80074 ACUTE HEPATITIS PANEL: CPT

## 2019-05-06 PROCEDURE — 85025 COMPLETE CBC W/AUTO DIFF WBC: CPT

## 2019-05-06 PROCEDURE — 80053 COMPREHEN METABOLIC PANEL: CPT

## 2019-05-06 PROCEDURE — 90715 TDAP VACCINE 7 YRS/> IM: CPT | Performed by: FAMILY MEDICINE

## 2019-05-06 PROCEDURE — 87591 N.GONORRHOEAE DNA AMP PROB: CPT

## 2019-05-06 PROCEDURE — 80061 LIPID PANEL: CPT

## 2019-05-06 PROCEDURE — 87522 HEPATITIS C REVRS TRNSCRPJ: CPT

## 2019-05-06 PROCEDURE — 99215 OFFICE O/P EST HI 40 MIN: CPT | Mod: 25 | Performed by: FAMILY MEDICINE

## 2019-05-06 PROCEDURE — 87491 CHLMYD TRACH DNA AMP PROBE: CPT

## 2019-05-06 PROCEDURE — 84443 ASSAY THYROID STIM HORMONE: CPT

## 2019-05-06 RX ORDER — AMLODIPINE BESYLATE 10 MG/1
10 TABLET ORAL DAILY
Qty: 60 TAB | Refills: 0 | Status: SHIPPED | OUTPATIENT
Start: 2019-05-06 | End: 2019-06-06

## 2019-05-06 ASSESSMENT — PATIENT HEALTH QUESTIONNAIRE - PHQ9: CLINICAL INTERPRETATION OF PHQ2 SCORE: 0
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
## 2019-05-06 NOTE — LETTER
[address] [address]     May 6, 2019    Patient: Sanjeev Charles   YOB: 1965   Date of Visit: 5/6/2019       To Whom It May Concern:    Sanjeev Charles was seen and treated in our department on 5/6/2019.     Sincerely,     Gwendolyn [facility]

## 2019-05-06 NOTE — PATIENT INSTRUCTIONS
Diagnoses and all orders for this visit:    Benign hypertension  -     Misc. Devices Misc; 1 Kit by Does not apply route every day.  -     US-RUQ; Future  -     REFERRAL TO CARDIOLOGY  -     EC-ECHOCARDIOGRAM COMPLETE W/ CONT; Future  -     DX-CHEST-2 VIEWS; Future  -     metoprolol (LOPRESSOR) 25 MG Tab; Take 1 Tab by mouth 2 times a day.    Need for vaccination  -     TDAP VACCINE =>6YO IM    BMI 29.0-29.9,adult    Sleep apnea, unspecified type  -     REFERRAL TO SLEEP STUDIES    Irregular heart beat  -     EKG  -     REFERRAL TO CARDIOLOGY  -     DX-CHEST-2 VIEWS; Future    Smoker  -     REFERRAL TO CARDIOLOGY  -     DX-CHEST-2 VIEWS; Future    Fatigue, unspecified type  -     CBC WITH DIFFERENTIAL; Future  -     TSH WITH REFLEX TO FT4; Future    Medication monitoring encounter  -     Comp Metabolic Panel; Future    Hyperglycemia  -     HEMOGLOBIN A1C; Future    Dyslipidemia  -     Lipid Profile; Future    Vitamin D deficiency  -     VITAMIN D,25 HYDROXY; Future    Vitamin B12 deficiency  -     VITAMIN B12; Future    Routine screening for STI (sexually transmitted infection)  -     HEPATITIS PANEL ACUTE(4 COMPONENTS); Future  -     HIV AG/AB COMBO ASSAY SCREENING; Future  -     RPR (SYPHILIS); Future  -     MISCELLANEOUS TEST; Future  -     Chlamydia/GC PCR Urine Or Swab; Future    Alcohol use  -     US-RUQ; Future    Atrial fibrillation, unspecified type (HCC)  -     REFERRAL TO CARDIOLOGY  -     EC-ECHOCARDIOGRAM COMPLETE W/ CONT; Future  -     DX-CHEST-2 VIEWS; Future  -     metoprolol (LOPRESSOR) 25 MG Tab; Take 1 Tab by mouth 2 times a day.  -     rivaroxaban (XARELTO) 20 MG Tab tablet; Take 1 Tab by mouth with dinner.  -     apixaban (ELIQUIS) 5mg Tab; Take 1 Tab by mouth 2 Times a Day.  -     REFERRAL TO ANTICOAGULATION MONITORING    Essential hypertension  -     amLODIPine (NORVASC) 10 MG Tab; Take 1 Tab by mouth every day.  -     metoprolol (LOPRESSOR) 25 MG Tab; Take 1 Tab by mouth 2 times a  day.    -Please get fasting lab work 8 hours of no eating but drink plenty of water at least 2 weeks before your follow-up appointment.  Please get a blood pressure cuff and check your blood pressure at least every second day sometimes in the morning, afternoon, evening in both arms and record the top number systolic and the bottom number diastolic and heart rate in both arms and put it in the patient instruction form given and bring this to your next appointment.  Tdap vaccine given.  Referral for sleep study given to get a new CPAP machine which is broken as he has sleep apnea.  Plan to patient because of his untreated sleep apnea and blood pressure and smoking cigarettes and [place] he is at increased chance for heart attack and stroke and will consider starting him on a statin and blood thinner.  He denies ever having any irregular heartbeats or being on any blood thinner.  Patient instructions given on tips for success for quitting smoking and will follow up with this to see if he needs any additional help and instructions on stroke prevention and signs to see.  ER precautions given if any chest pain, shortness of breath, palpitations, passing out then go to the ER or call 911.  For now continue amlodipine 10 mg daily and will see if we need to change this medication or add anything.  Will work on weight control and weight loss.  If he standing on his feet for a long time please wear compression stockings to help with circulation. Work on weight loss and also to decrease alcohol as this can make Blood pressure worse and having 2 beers a day. EKG showing new onset A. fib and EKG showing heart rate in 79 211 with A. fib and will start him on what ever is cheaper to his pharmacy Xarelto 20 mg daily or Eliquis 5 mg twice a day and also start 1 of these based on what is keeping the pharmacy and also start metoprolol 25 mg twice a day and will see the cardiologist for referral and the pharmacist for  anticoagulation for new onset A. fib and precautions given and under patient instructions.  Will work on quitting smoking and chewing tobacco and please make an appointment to see a dentist and eye doctor.    Return in about 5 weeks (around 6/11/2019), or lab FU, for High blood pressure, Long, 40 min appnt.    Introduction  Your blood pressure on this visit to the emergency department or clinic is elevated. This does not necessarily mean you have high blood pressure (hypertension), but it does mean that your blood pressure needs to be rechecked. Many times your blood pressure can increase due to illness, pain, anxiety, or other factors.  We recommend that you get a series of blood pressure readings done over a period of 5 days. It is best to get a reading in the morning and one in the evening. You should make sure to sit and relax for 1-5 minutes before the reading is taken. Write the readings down and make a follow-up appointment with your health care provider to discuss the results. If there is not a free clinic or a drug store with a blood-pressure-taking machine near you, you can purchase blood-pressure-taking equipment from a drug store. Having one in the home allows you the convenience of taking your blood pressure while you are home and relaxed.  BLOOD PRESSURE LOG   Date: _______________________  · a.m. _____________________  · p.m. _____________________  Date: _______________________  · a.m. _____________________  · p.m. _____________________  Date: _______________________  · a.m. _____________________  · p.m. _____________________  Date: _______________________  · a.m. _____________________  · p.m. _____________________  Date: _______________________  · a.m. _____________________  · p.m. _____________________  This information is not intended to replace advice given to you by your health care provider. Make sure you discuss any questions you have with your health care provider.      How to Take Your Blood  Pressure  Blood pressure is a measurement of how strongly your blood is pressing against the walls of your arteries. Arteries are blood vessels that carry blood from your heart throughout your body. Your health care provider takes your blood pressure at each office visit. You can also take your own blood pressure at home with a blood pressure machine. You may need to take your own blood pressure:  · To confirm a diagnosis of high blood pressure (hypertension).  · To monitor your blood pressure over time.  · To make sure your blood pressure medicine is working.  Supplies needed:  To take your blood pressure, you will need a blood pressure machine. You can buy a blood pressure machine, or blood pressure monitor, at most [employer] or online. There are several types of home blood pressure monitors. When choosing one, consider the following:  · Choose a monitor that has an arm cuff.  · Choose a monitor that wraps snugly around your upper arm. You should be able to fit only one finger between your arm and the cuff.  · Do not choose a monitor that measures your blood pressure from your wrist or finger.  Your health care provider can suggest a reliable monitor that will meet your needs.  How to prepare  To get the most accurate reading, avoid the following for 30 minutes before you check your blood pressure:  · Drinking caffeine.  · Drinking alcohol.  · Eating.  · Smoking.  · Exercising.  Five minutes before you check your blood pressure:  · Empty your bladder.  · Sit quietly without talking in a dining chair, rather than in a soft couch or armchair.  How to take your blood pressure  To check your blood pressure, follow the instructions in the manual that came with your blood pressure monitor. If you have a digital blood pressure monitor, the instructions may be as follows:  1. Sit up straight.  2. Place your feet on the floor. Do not cross your ankles or legs.  3. Rest your left arm at the level of your heart on a table  "or desk or on the arm of a chair.  4. Pull up your shirt sleeve.  5. Wrap the blood pressure cuff around the upper part of your left arm, 1 inch (2.5 cm) above your elbow. It is best to wrap the cuff around bare skin.  6. Fit the cuff snugly around your arm. You should be able to place only one finger between the cuff and your arm.  7. Position the cord inside the groove of your elbow.  8. Press the power button.  9. Sit quietly while the cuff inflates and deflates.  10. Read the digital reading on the monitor screen and write it down (record it).  11. Wait 2-3 minutes, then repeat the steps starting at step 1.  What does my blood pressure reading mean?  A blood pressure reading is recorded as two numbers, such as \"120 over 80\" (or 120/80). The first (\"top\") number is called the systolic pressure. It is a measure of the pressure in your arteries as the heart beats. The second (\"bottom\") number is called the diastolic pressure. It is a measure of the pressure in your arteries as the heart relaxes between beats.  Blood pressure is classified into four stages. The following are the stages for adults who do not have a short-term serious illness or a chronic condition. Systolic pressure and diastolic pressure are measured in a unit called mm Hg.  Normal  · Systolic pressure: below 120.  · Diastolic pressure: below 80.  Prehypertension  · Systolic pressure: 120-139.  · Diastolic pressure: 80-89.  Hypertension stage 1  · Systolic pressure: 140-159.  · Diastolic pressure: 90-99.  Hypertension stage 2  · Systolic pressure: 160 or above.  · Diastolic pressure: 100 or above.  You can have prehypertension or hypertension even if only the systolic or only the diastolic number in your reading is higher than normal.  Follow these instructions at home:  · Check your blood pressure as often as recommended by your health care provider.  · Take your monitor to the next appointment with your health care provider to make sure:  ¨ That " you are using it correctly.  ¨ That it provides accurate readings.  · Be sure you understand what your goal blood pressure numbers are.  · Tell your health care provider if you are having any side effects from blood pressure medicine.  Contact a health care provider if:  · Your blood pressure is consistently high.  Get help right away if:  · Your systolic blood pressure is higher than 180.  · Your diastolic blood pressure is higher than 110.  This information is not intended to replace advice given to you by your health care provider. Make sure you discuss any questions you have with your health care provider.      Hypertension  Hypertension, commonly called high blood pressure, is when the force of blood pumping through your arteries is too strong. Your arteries are the blood vessels that carry blood from your heart throughout your body. A blood pressure reading consists of a higher number over a lower number, such as 110/72. The higher number (systolic) is the pressure inside your arteries when your heart pumps. The lower number (diastolic) is the pressure inside your arteries when your heart relaxes. Ideally you want your blood pressure below 120/80.  Hypertension forces your heart to work harder to pump blood. Your arteries may become narrow or stiff. Having untreated or uncontrolled hypertension can cause heart attack, stroke, kidney disease, and other problems.  What increases the risk?  Some risk factors for high blood pressure are controllable. Others are not.  Risk factors you cannot control include:  · Race. You may be at higher risk if you are .  · Age. Risk increases with age.  · Gender. Men are at higher risk than women before age 45 years. After age 65, women are at higher risk than men.  Risk factors you can control include:  · Not getting enough exercise or physical activity.  · Being overweight.  · Getting too much fat, sugar, calories, or salt in your diet.  · Drinking too much  alcohol.  What are the signs or symptoms?  Hypertension does not usually cause signs or symptoms. Extremely high blood pressure (hypertensive crisis) may cause headache, anxiety, shortness of breath, and nosebleed.  How is this diagnosed?  To check if you have hypertension, your health care provider will measure your blood pressure while you are seated, with your arm held at the level of your heart. It should be measured at least twice using the same arm. Certain conditions can cause a difference in blood pressure between your right and left arms. A blood pressure reading that is higher than normal on one occasion does not mean that you need treatment. If it is not clear whether you have high blood pressure, you may be asked to return on a different day to have your blood pressure checked again. Or, you may be asked to monitor your blood pressure at home for 1 or more weeks.  How is this treated?  Treating high blood pressure includes making lifestyle changes and possibly taking medicine. Living a healthy lifestyle can help lower high blood pressure. You may need to change some of your habits.  Lifestyle changes may include:  · Following the DASH diet. This diet is high in fruits, vegetables, and whole grains. It is low in salt, red meat, and added sugars.  · Keep your sodium intake below 2,300 mg per day.  · Getting at least 30-45 minutes of aerobic exercise at least 4 times per week.  · Losing weight if necessary.  · Not smoking.  · Limiting alcoholic beverages.  · Learning ways to reduce stress.  Your health care provider may prescribe medicine if lifestyle changes are not enough to get your blood pressure under control, and if one of the following is true:  · You are 18-59 years of age and your systolic blood pressure is above 140.  · You are 60 years of age or older, and your systolic blood pressure is above 150.  · Your diastolic blood pressure is above 90.  · You have diabetes, and your systolic blood  pressure is over 140 or your diastolic blood pressure is over 90.  · You have kidney disease and your blood pressure is above 140/90.  · You have heart disease and your blood pressure is above 140/90.  Your personal target blood pressure may vary depending on your medical conditions, your age, and other factors.  Follow these instructions at home:  · Have your blood pressure rechecked as directed by your health care provider.  · Take medicines only as directed by your health care provider. Follow the directions carefully. Blood pressure medicines must be taken as prescribed. The medicine does not work as well when you skip doses. Skipping doses also puts you at risk for problems.  · Do not smoke.  · Monitor your blood pressure at home as directed by your health care provider.  Contact a health care provider if:  · You think you are having a reaction to medicines taken.  · You have recurrent headaches or feel dizzy.  · You have swelling in your ankles.  · You have trouble with your vision.  Get help right away if:  · You develop a severe headache or confusion.  · You have unusual weakness, numbness, or feel faint.  · You have severe chest or abdominal pain.  · You vomit repeatedly.  · You have trouble breathing.  This information is not intended to replace advice given to you by your health care provider. Make sure you discuss any questions you have with your health care provider.    Stroke Prevention  Some medical conditions and behaviors are associated with an increased chance of having a stroke. You may prevent a stroke by making healthy choices and managing medical conditions.  How can I reduce my risk of having a stroke?  · Stay physically active. Get at least 30 minutes of activity on most or all days.  · Do not smoke. It may also be helpful to avoid exposure to secondhand smoke.  · Limit alcohol use. Moderate alcohol use is considered to be:  ¨ No more than 2 drinks per day for men.  ¨ No more than 1 drink per  day for nonpregnant women.  · Eat healthy foods. This involves:  ¨ Eating 5 or more servings of fruits and vegetables a day.  ¨ Making dietary changes that address high blood pressure (hypertension), high cholesterol, diabetes, or obesity.  · Manage your cholesterol levels.  ¨ Making food choices that are high in fiber and low in saturated fat, trans fat, and cholesterol may control cholesterol levels.  ¨ Take any prescribed medicines to control cholesterol as directed by your health care provider.  · Manage your diabetes.  ¨ Controlling your carbohydrate and sugar intake is recommended to manage diabetes.  ¨ Take any prescribed medicines to control diabetes as directed by your health care provider.  · Control your hypertension.  ¨ Making food choices that are low in salt (sodium), saturated fat, trans fat, and cholesterol is recommended to manage hypertension.  ¨ Ask your health care provider if you need treatment to lower your blood pressure. Take any prescribed medicines to control hypertension as directed by your health care provider.  ¨ If you are 18-39 years of age, have your blood pressure checked every 3-5 years. If you are 40 years of age or older, have your blood pressure checked every year.  · Maintain a healthy weight.  ¨ Reducing calorie intake and making food choices that are low in sodium, saturated fat, trans fat, and cholesterol are recommended to manage weight.  · Stop drug abuse.  · Avoid taking birth control pills.  ¨ Talk to your health care provider about the risks of taking birth control pills if you are over 35 years old, smoke, get migraines, or have ever had a blood clot.  · Get evaluated for sleep disorders (sleep apnea).  ¨ Talk to your health care provider about getting a sleep evaluation if you snore a lot or have excessive sleepiness.  · Take medicines only as directed by your health care provider.  ¨ For some people, aspirin or blood thinners (anticoagulants) are helpful in reducing  the risk of forming abnormal blood clots that can lead to stroke. If you have the irregular heart rhythm of atrial fibrillation, you should be on a blood thinner unless there is a good reason you cannot take them.  ¨ Understand all your medicine instructions.  · Make sure that other conditions (such as anemia or atherosclerosis) are addressed.  Get help right away if:  · You have sudden weakness or numbness of the face, arm, or leg, especially on one side of the body.  · Your face or eyelid droops to one side.  · You have sudden confusion.  · You have trouble speaking (aphasia) or understanding.  · You have sudden trouble seeing in one or both eyes.  · You have sudden trouble walking.  · You have dizziness.  · You have a loss of balance or coordination.  · You have a sudden, severe headache with no known cause.  · You have new chest pain or an irregular heartbeat.  Any of these symptoms may represent a serious problem that is an emergency. Do not wait to see if the symptoms will go away. Get medical help at once. Call your local emergency services (911 in [place] Do not drive yourself to the hospital.   This information is not intended to replace advice given to you by your health care provider. Make sure you discuss any questions you have with your health care provider.    Smoking Cessation, Tips for Success  If you are ready to quit smoking, congratulations! You have chosen to help yourself be healthier. Cigarettes bring nicotine, tar, carbon monoxide, and other irritants into your body. Your lungs, heart, and blood vessels will be able to work better without these poisons. There are many different ways to quit smoking. Nicotine gum, nicotine patches, a nicotine inhaler, or nicotine nasal spray can help with physical craving. Hypnosis, support groups, and medicines help break the habit of smoking.  WHAT THINGS CAN I DO TO MAKE QUITTING EASIER?   Here are some tips to help you quit for good:  · Pick a date when you  "will quit smoking completely. Tell all of your friends and family about your plan to quit on that date.  · Do not try to slowly cut down on the number of cigarettes you are smoking. Pick a quit date and quit smoking completely starting on that day.  · Throw away all cigarettes.    · Clean and remove all ashtrays from your home, work, and car.  · On a card, write down your reasons for quitting. Carry the card with you and read it when you get the urge to smoke.  · Cleanse your body of nicotine. Drink enough water and fluids to keep your urine clear or pale yellow. Do this after quitting to flush the nicotine from your body.  · Learn to predict your moods. Do not let a bad situation be your excuse to have a cigarette. Some situations in your life might tempt you into wanting a cigarette.  · Never have \"just one\" cigarette. It leads to wanting another and another. Remind yourself of your decision to quit.  · Change habits associated with smoking. If you smoked while driving or when feeling stressed, try other activities to replace smoking. Stand up when drinking your coffee. Brush your teeth after eating. Sit in a different chair when you read the paper. Avoid alcohol while trying to quit, and try to drink fewer caffeinated beverages. Alcohol and caffeine may urge you to smoke.  · Avoid foods and drinks that can trigger a desire to smoke, such as sugary or spicy foods and alcohol.  · Ask people who smoke not to smoke around you.  · Have something planned to do right after eating or having a cup of coffee. For example, plan to take a walk or exercise.  · Try a relaxation exercise to calm you down and decrease your stress. Remember, you may be tense and nervous for the first 2 weeks after you quit, but this will pass.  · Find new activities to keep your hands busy. Play with a pen, coin, or rubber band. Doodle or draw things on paper.  · Brush your teeth right after eating. This will help cut down on the craving for the " "taste of tobacco after meals. You can also try mouthwash.    · Use oral substitutes in place of cigarettes. Try using lemon drops, carrots, cinnamon sticks, or chewing gum. Keep them handy so they are available when you have the urge to smoke.  · When you have the urge to smoke, try deep breathing.  · Designate your home as a nonsmoking area.  · If you are a heavy smoker, ask your health care provider about a prescription for nicotine chewing gum. It can ease your withdrawal from nicotine.  · Reward yourself. Set aside the cigarette money you save and buy yourself something nice.  · Look for support from others. Join a support group or smoking cessation program. Ask someone at home or at work to help you with your plan to quit smoking.  · Always ask yourself, \"Do I need this cigarette or is this just a reflex?\" Tell yourself, \"Today, I choose not to smoke,\" or \"I do not want to smoke.\" You are reminding yourself of your decision to quit.  · Do not replace cigarette smoking with electronic cigarettes (commonly called e-cigarettes). The safety of e-cigarettes is unknown, and some may contain harmful chemicals.  · If you relapse, do not give up! Plan ahead and think about what you will do the next time you get the urge to smoke.  HOW WILL I FEEL WHEN I QUIT SMOKING?  You may have symptoms of withdrawal because your body is used to nicotine (the addictive substance in cigarettes). You may crave cigarettes, be irritable, feel very hungry, cough often, get headaches, or have difficulty concentrating. The withdrawal symptoms are only temporary. They are strongest when you first quit but will go away within 10-14 days. When withdrawal symptoms occur, stay in control. Think about your reasons for quitting. Remind yourself that these are signs that your body is healing and getting used to being without cigarettes. Remember that withdrawal symptoms are easier to treat than the major diseases that smoking can cause.   Even " after the withdrawal is over, expect periodic urges to smoke. However, these cravings are generally short lived and will go away whether you smoke or not. Do not smoke!  WHAT RESOURCES ARE AVAILABLE TO HELP ME QUIT SMOKING?  Your health care provider can direct you to community resources or hospitals for support, which may include:  · Group support.  · Education.  · Hypnosis.  · Therapy.     This information is not intended to replace advice given to you by your health care provider. Make sure you discuss any questions you have with your health care provider.       Steps to Quit Smoking  Smoking tobacco can be harmful to your health and can affect almost every organ in your body. Smoking puts you, and those around you, at risk for developing many serious chronic diseases. Quitting smoking is difficult, but it is one of the best things that you can do for your health. It is never too late to quit.  What are the benefits of quitting smoking?  When you quit smoking, you lower your risk of developing serious diseases and conditions, such as:  · Lung cancer or lung disease, such as COPD.  · Heart disease.  · Stroke.  · Heart attack.  · Infertility.  · Osteoporosis and bone fractures.  Additionally, symptoms such as coughing, wheezing, and shortness of breath may get better when you quit. You may also find that you get sick less often because your body is stronger at fighting off colds and infections. If you are pregnant, quitting smoking can help to reduce your chances of having a baby of low birth weight.  How do I get ready to quit?  When you decide to quit smoking, create a plan to make sure that you are successful. Before you quit:  · Pick a date to quit. Set a date within the next two weeks to give you time to prepare.  · Write down the reasons why you are quitting. Keep this list in places where you will see it often, such as on your bathroom mirror or in your car or wallet.  · Identify the people, places, things,  and activities that make you want to smoke (triggers) and avoid them. Make sure to take these actions:  ¨ Throw away all cigarettes at home, at work, and in your car.  ¨ Throw away smoking accessories, such as ashtrays and lighters.  ¨ Clean your car and make sure to empty the ashtray.  ¨ Clean your home, including curtains and carpets.  · Tell your family, friends, and coworkers that you are quitting. Support from your loved ones can make quitting easier.  · Talk with your health care provider about your options for quitting smoking.  · Find out what treatment options are covered by your health insurance.  What strategies can I use to quit smoking?  Talk with your healthcare provider about different strategies to quit smoking. Some strategies include:  · Quitting smoking altogether instead of gradually lessening how much you smoke over a period of time. Research shows that quitting “cold turkey” is more successful than gradually quitting.  · Attending in-person counseling to help you build problem-solving skills. You are more likely to have success in quitting if you attend several counseling sessions. Even short sessions of 10 minutes can be effective.  · Finding resources and support systems that can help you to quit smoking and remain smoke-free after you quit. These resources are most helpful when you use them often. They can include:  ¨ Online chats with a counselor.  ¨ Telephone quitlines.  ¨ Printed self-help materials.  ¨ Support groups or group counseling.  ¨ Text messaging programs.  ¨ Mobile phone applications.  · Taking medicines to help you quit smoking. (If you are pregnant or breastfeeding, talk with your health care provider first.) Some medicines contain nicotine and some do not. Both types of medicines help with cravings, but the medicines that include nicotine help to relieve withdrawal symptoms. Your health care provider may recommend:  ¨ Nicotine patches, gum, or lozenges.  ¨ Nicotine inhalers  or sprays.  ¨ Non-nicotine medicine that is taken by mouth.  Talk with your health care provider about combining strategies, such as taking medicines while you are also receiving in-person counseling. Using these two strategies together makes you more likely to succeed in quitting than if you used either strategy on its own.  If you are pregnant or breastfeeding, talk with your health care provider about finding counseling or other support strategies to quit smoking. Do not take medicine to help you quit smoking unless told to do so by your health care provider.  What things can I do to make it easier to quit?  Quitting smoking might feel overwhelming at first, but there is a lot that you can do to make it easier. Take these important actions:  · Reach out to your family and friends and ask that they support and encourage you during this time. Call telephone quitlines, reach out to support groups, or work with a counselor for support.  · Ask people who smoke to avoid smoking around you.  · Avoid places that trigger you to smoke, such as bars, parties, or smoke-break areas at work.  · Spend time around people who do not smoke.  · Lessen stress in your life, because stress can be a smoking trigger for some people. To lessen stress, try:  ¨ Exercising regularly.  ¨ Deep-breathing exercises.  ¨ Yoga.  ¨ Meditating.  ¨ Performing a body scan. This involves closing your eyes, scanning your body from head to toe, and noticing which parts of your body are particularly tense. Purposefully relax the muscles in those areas.  · Download or purchase mobile phone or tablet apps (applications) that can help you stick to your quit plan by providing reminders, tips, and encouragement. There are many free apps, such as QuitGuide from the CDC (Centers for Disease Control and Prevention). You can find other support for quitting smoking (smoking cessation) through smokefree.gov and other websites.  How will I feel when I quit  smoking?  Within the first 24 hours of quitting smoking, you may start to feel some withdrawal symptoms. These symptoms are usually most noticeable 2-3 days after quitting, but they usually do not last beyond 2-3 weeks. Changes or symptoms that you might experience include:  · Mood swings.  · Restlessness, anxiety, or irritation.  · Difficulty concentrating.  · Dizziness.  · Strong cravings for sugary foods in addition to nicotine.  · Mild weight gain.  · Constipation.  · Nausea.  · Coughing or a sore throat.  · Changes in how your medicines work in your body.  · A depressed mood.  · Difficulty sleeping (insomnia).  After the first 2-3 weeks of quitting, you may start to notice more positive results, such as:  · Improved sense of smell and taste.  · Decreased coughing and sore throat.  · Slower heart rate.  · Lower blood pressure.  · Clearer skin.  · The ability to breathe more easily.  · Fewer sick days.  Quitting smoking is very challenging for most people. Do not get discouraged if you are not successful the first time. Some people need to make many attempts to quit before they achieve long-term success. Do your best to stick to your quit plan, and talk with your health care provider if you have any questions or concerns.  This information is not intended to replace advice given to you by your health care provider. Make sure you discuss any questions you have with your health care provider.      How to Use Compression Stockings  Compression stockings are elastic socks that squeeze the legs. They help to increase blood flow to the legs, decrease swelling in the legs, and reduce the chance of developing blood clots in the lower legs. Compression stockings are often used by people who:  · Are recovering from surgery.  · Have poor circulation in their legs.  · Are prone to getting blood clots in their legs.  · Have varicose veins.  · Sit or stay in bed for long periods of time.  How to use compression  stockings  Before you put on your compression stockings:  · Make sure that they are the correct size. If you do not know your size, ask your health care provider.  · Make sure that they are clean, dry, and in good condition.  · Check them for rips and tears. Do not put them on if they are ripped or torn.  Put your stockings on first thing in the morning, before you get out of bed. Keep them on for as long as your health care provider advises. When you are wearing your stockings:  · Keep them as smooth as possible. Do not allow them to bunch up. It is especially important to prevent the stockings from bunching up around your toes or behind your knees.  · Do not roll the stockings downward and leave them rolled down. This can decrease blood flow to your leg.  · Change them right away if they become wet or dirty.  When you take off your stockings, inspect your legs and feet. Anything that does not seem normal may require medical attention. Look for:  · Open sores.  · Red spots.  · Swelling.  Information and tips  · Do not stop wearing your compression stockings without talking to your health care provider first.  · Wash your stockings every day with mild detergent in cold or warm water. Do not use bleach. Air-dry your stockings or dry them in a clothes dryer on low heat.  · Replace your stockings every 3-6 months.  · If skin moisturizing is part of your treatment plan, apply lotion or cream at night so that your skin will be dry when you put on the stockings in the morning. It is harder to put the stockings on when you have lotion on your legs or feet.  Contact a health care provider if:  Remove your stockings and seek medical care if:  · You have a feeling of pins and needles in your feet or legs.  · You have any new changes in your skin.  · You have skin lesions that are getting worse.  · You have swelling or pain that is getting worse.  Get help right away if:  · You have numbness or tingling in your lower legs that  does not get better right after you take the stockings off.  · Your toes or feet become cold and blue.  · You develop open sores or red spots on your legs that do not go away.  · You see or feel a warm spot on your leg.  · You have new swelling or soreness in your leg.  · You are short of breath or you have chest pain for no reason.  · You have a rapid or irregular heartbeat.  · You feel light-headed or dizzy.  This information is not intended to replace advice given to you by your health care provider. Make sure you discuss any questions you have with your health care provider.    Atrial Fibrillation  Atrial fibrillation is a type of irregular or rapid heartbeat (arrhythmia). In atrial fibrillation, the heart quivers continuously in a chaotic pattern. This occurs when parts of the heart receive disorganized signals that make the heart unable to pump blood normally. This can increase the risk for stroke, heart failure, and other heart-related conditions. There are different types of atrial fibrillation, including:  · Paroxysmal atrial fibrillation. This type starts suddenly, and it usually stops on its own shortly after it starts.  · Persistent atrial fibrillation. This type often lasts longer than a week. It may stop on its own or with treatment.  · Long-lasting persistent atrial fibrillation. This type lasts longer than 12 months.  · Permanent atrial fibrillation. This type does not go away.  Talk with your health care provider to learn about the type of atrial fibrillation that you have.  What are the causes?  This condition is caused by some heart-related conditions or procedures, including:  · A heart attack.  · Coronary artery disease.  · Heart failure.  · Heart valve conditions.  · High blood pressure.  · Inflammation of the sac that surrounds the heart (pericarditis).  · Heart surgery.  · Certain heart rhythm disorders, such as Stover-Parkinson-White syndrome.  Other causes  include:  · Pneumonia.  · Obstructive sleep apnea.  · Blockage of an artery in the lungs (pulmonary embolism, or PE).  · Lung cancer.  · Chronic lung disease.  · Thyroid problems, especially if the thyroid is overactive (hyperthyroidism).  · Caffeine.  · Excessive alcohol use or illegal drug use.  · Use of some medicines, including certain decongestants and diet pills.  Sometimes, the cause cannot be found.  What increases the risk?  This condition is more likely to develop in:  · People who are older in age.  · People who smoke.  · People who have diabetes mellitus.  · People who are overweight (obese).  · Athletes who exercise vigorously.  What are the signs or symptoms?  Symptoms of this condition include:  · A feeling that your heart is beating rapidly or irregularly.  · A feeling of discomfort or pain in your chest.  · Shortness of breath.  · Sudden light-headedness or weakness.  · Getting tired easily during exercise.  In some cases, there are no symptoms.  How is this diagnosed?  Your health care provider may be able to detect atrial fibrillation when taking your pulse. If detected, this condition may be diagnosed with:  · An electrocardiogram (ECG).  · A Holter monitor test that records your heartbeat patterns over a 24-hour period.  · Transthoracic echocardiogram (TTE) to evaluate how blood flows through your heart.  · Transesophageal echocardiogram (NICOLE) to view more detailed images of your heart.  · A stress test.  · Imaging tests, such as a CT scan or chest X-ray.  · Blood tests.  How is this treated?  The main goals of treatment are to prevent blood clots from forming and to keep your heart beating at a normal rate and rhythm. The type of treatment that you receive depends on many factors, such as your underlying medical conditions and how you feel when you are experiencing atrial fibrillation.  This condition may be treated with:  · Medicine to slow down the heart rate, bring the heart’s rhythm back to  normal, or prevent clots from forming.  · Electrical cardioversion. This is a procedure that resets your heart’s rhythm by delivering a controlled, low-energy shock to the heart through your skin.  · Different types of ablation, such as catheter ablation, catheter ablation with pacemaker, or surgical ablation. These procedures destroy the heart tissues that send abnormal signals. When the pacemaker is used, it is placed under your skin to help your heart beat in a regular rhythm.  Follow these instructions at home:  · Take over-the counter and prescription medicines only as told by your health care provider.  · If your health care provider prescribed a blood-thinning medicine (anticoagulant), take it exactly as told. Taking too much blood-thinning medicine can cause bleeding. If you do not take enough blood-thinning medicine, you will not have the protection that you need against stroke and other problems.  · Do not use tobacco products, including cigarettes, chewing tobacco, and e-cigarettes. If you need help quitting, ask your health care provider.  · If you have obstructive sleep apnea, manage your condition as told by your health care provider.  · Do not drink alcohol.  · Do not drink beverages that contain caffeine, such as coffee, soda, and tea.  · Maintain a healthy weight. Do not use diet pills unless your health care provider approves. Diet pills may make heart problems worse.  · Follow diet instructions as told by your health care provider.  · Exercise regularly as told by your health care provider.  · Keep all follow-up visits as told by your health care provider. This is important.  How is this prevented?  · Avoid drinking beverages that contain caffeine or alcohol.  · Avoid certain medicines, especially medicines that are used for breathing problems.  · Avoid certain herbs and herbal medicines, such as those that contain ephedra or ginseng.  · Do not use illegal drugs, such as cocaine and  amphetamines.  · Do not smoke.  · Manage your high blood pressure.  Contact a health care provider if:  · You notice a change in the rate, rhythm, or strength of your heartbeat.  · You are taking an anticoagulant and you notice increased bruising.  · You tire more easily when you exercise or exert yourself.  Get help right away if:  · You have chest pain, abdominal pain, sweating, or weakness.  · You feel nauseous.  · You notice blood in your vomit, bowel movement, or urine.  · You have shortness of breath.  · You suddenly have swollen feet and ankles.  · You feel dizzy.  · You have sudden weakness or numbness of the face, arm, or leg, especially on one side of the body.  · You have trouble speaking, trouble understanding, or both (aphasia).  · Your face or your eyelid droops on one side.  These symptoms may represent a serious problem that is an emergency. Do not wait to see if the symptoms will go away. Get medical help right away. Call your local emergency services (911 in the [place] Do not drive yourself to the hospital.   This information is not intended to replace advice given to you by your health care provider. Make sure you discuss any questions you have with your health care provider.    Metoprolol tablets  What is this medicine?  METOPROLOL (me TOE proe lole) is a beta-blocker. Beta-blockers reduce the workload on the heart and help it to beat more regularly. This medicine is used to treat high blood pressure and to prevent chest pain. It is also used to after a heart attack and to prevent an additional heart attack from occurring.  This medicine may be used for other purposes; ask your health care provider or pharmacist if you have questions.  COMMON BRAND NAME(S): Lopressor  What should I tell my health care provider before I take this medicine?  They need to know if you have any of these conditions:  -diabetes  -heart or vessel disease like slow heart rate, worsening heart failure, heart block, sick  sinus syndrome or Raynaud's disease  -kidney disease  -liver disease  -lung or breathing disease, like asthma or emphysema  -pheochromocytoma  -thyroid disease  -an unusual or allergic reaction to metoprolol, other beta-blockers, medicines, foods, dyes, or preservatives  -pregnant or trying to get pregnant  -breast-feeding  How should I use this medicine?  Take this medicine by mouth with a drink of water. Follow the directions on the prescription label. Take this medicine immediately after meals. Take your doses at regular intervals. Do not take more medicine than directed. Do not stop taking this medicine suddenly. This could lead to serious heart-related effects.  Talk to your pediatrician regarding the use of this medicine in children. Special care may be needed.  Overdosage: If you think you have taken too much of this medicine contact a poison control center or emergency room at once.  NOTE: This medicine is only for you. Do not share this medicine with others.  What if I miss a dose?  If you miss a dose, take it as soon as you can. If it is almost time for your next dose, take only that dose. Do not take double or extra doses.  What may interact with this medicine?  This medicine may interact with the following medications:  -certain medicines for blood pressure, heart disease, irregular heart beat  -certain medicines for depression like monoamine oxidase (MAO) inhibitors, fluoxetine, or paroxetine  -clonidine  -dobutamine  -epinephrine  -isoproterenol  -reserpine  This list may not describe all possible interactions. Give your health care provider a list of all the medicines, herbs, non-prescription drugs, or dietary supplements you use. Also tell them if you smoke, drink alcohol, or use illegal drugs. Some items may interact with your medicine.  What should I watch for while using this medicine?  Visit your doctor or health care professional for regular check ups. Contact your doctor right away if your  symptoms worsen. Check your blood pressure and pulse rate regularly. Ask your health care professional what your blood pressure and pulse rate should be, and when you should contact them.  You may get drowsy or dizzy. Do not drive, use machinery, or do anything that needs mental alertness until you know how this medicine affects you. Do not sit or stand up quickly, especially if you are an older patient. This reduces the risk of dizzy or fainting spells. Contact your doctor if these symptoms continue. Alcohol may interfere with the effect of this medicine. Avoid alcoholic drinks.  What side effects may I notice from receiving this medicine?  Side effects that you should report to your doctor or health care professional as soon as possible:  -allergic reactions like skin rash, itching or hives  -cold or numb hands or feet  -depression  -difficulty breathing  -faint  -fever with sore throat  -irregular heartbeat, chest pain  -rapid weight gain  -swollen legs or ankles  Side effects that usually do not require medical attention (report to your doctor or health care professional if they continue or are bothersome):  -anxiety or nervousness  -change in sex drive or performance  -dry skin  -headache  -nightmares or trouble sleeping  -short term memory loss  -stomach upset or diarrhea  -unusually tired  This list may not describe all possible side effects. Call your doctor for medical advice about side effects. You may report side effects to FDA at 0-857-FDA-9125.  Where should I keep my medicine?  Keep out of the reach of children.  Store at room temperature between 15 and 30 degrees C (59 and 86 degrees F). Throw away any unused medicine after the expiration date.  NOTE: This sheet is a summary. It may not cover all possible information. If you have questions about this medicine, talk to your doctor, pharmacist, or health care provider.  © 2018 [employer] (2014-08-22 14:40:36)    Rivaroxaban oral  tablets  What is this medicine?  RIVAROXABAN (ri va HELDER a ban) is an anticoagulant (blood thinner). It is used to treat blood clots in the lungs or in the veins. It is also used after knee or hip surgeries to prevent blood clots. It is also used to lower the chance of stroke in people with a medical condition called atrial fibrillation.  This medicine may be used for other purposes; ask your health care provider or pharmacist if you have questions.  COMMON BRAND NAME(S): Xarelto, Xarelto Starter Pack  What should I tell my health care provider before I take this medicine?  They need to know if you have any of these conditions:  -bleeding disorders  -bleeding in the brain  -blood in your stools (black or tarry stools) or if you have blood in your vomit  -history of stomach bleeding  -kidney disease  -liver disease  -low blood counts, like low white cell, platelet, or red cell counts  -recent or planned spinal or epidural procedure  -take medicines that treat or prevent blood clots  -an unusual or allergic reaction to rivaroxaban, other medicines, foods, dyes, or preservatives  -pregnant or trying to get pregnant  -breast-feeding  How should I use this medicine?  Take this medicine by mouth with a glass of water. Follow the directions on the prescription label. Take your medicine at regular intervals. Do not take it more often than directed. Do not stop taking except on your doctor's advice. Stopping this medicine may increase your risk of a blood clot. Be sure to refill your prescription before you run out of medicine.  If you are taking this medicine after hip or knee replacement surgery, take it with or without food. If you are taking this medicine for atrial fibrillation, take it with your evening meal. If you are taking this medicine to treat blood clots, take it with food at the same time each day. If you are unable to swallow your tablet, you may crush the tablet and mix it in applesauce. Then, immediately eat  the applesauce. You should eat more food right after you eat the applesauce containing the crushed tablet.  Talk to your pediatrician regarding the use of this medicine in children. Special care may be needed.  Overdosage: If you think you have taken too much of this medicine contact a poison control center or emergency room at once.  NOTE: This medicine is only for you. Do not share this medicine with others.  What if I miss a dose?  If you take your medicine once a day and miss a dose, take the missed dose as soon as you remember. If you take your medicine twice a day and miss a dose, take the missed dose immediately. In this instance, 2 tablets may be taken at the same time. The next day you should take 1 tablet twice a day as directed.  What may interact with this medicine?  Do not take this medicine with any of the following medications:  -defibrotide  This medicine may also interact with the following medications:  -aspirin and aspirin-like medicines  -certain antibiotics like erythromycin, azithromycin, and clarithromycin  -certain medicines for fungal infections like ketoconazole and itraconazole  -certain medicines for irregular heart beat like amiodarone, quinidine, dronedarone  -certain medicines for seizures like carbamazepine, phenytoin  -certain medicines that treat or prevent blood clots like warfarin, enoxaparin, and dalteparin  -conivaptan  -diltiazem  -felodipine  -indinavir  -lopinavir; ritonavir  -NSAIDS, medicines for pain and inflammation, like ibuprofen or naproxen  -ranolazine  -rifampin  -ritonavir  -SNRIs, medicines for depression, like desvenlafaxine, duloxetine, levomilnacipran, venlafaxine  -SSRIs, medicines for depression, like citalopram, escitalopram, fluoxetine, fluvoxamine, paroxetine, sertraline  -Bjorn's wort  -verapamil  This list may not describe all possible interactions. Give your health care provider a list of all the medicines, herbs, non-prescription drugs, or dietary  supplements you use. Also tell them if you smoke, drink alcohol, or use illegal drugs. Some items may interact with your medicine.  What should I watch for while using this medicine?  Visit your doctor or health care professional for regular checks on your progress.  Notify your doctor or health care professional and seek emergency treatment if you develop breathing problems; changes in vision; chest pain; severe, sudden headache; pain, swelling, warmth in the leg; trouble speaking; sudden numbness or weakness of the face, arm or leg. These can be signs that your condition has gotten worse.  If you are going to have surgery or other procedure, tell your doctor that you are taking this medicine.  What side effects may I notice from receiving this medicine?  Side effects that you should report to your doctor or health care professional as soon as possible:  -allergic reactions like skin rash, itching or hives, swelling of the face, lips, or tongue  -back pain  -redness, blistering, peeling or loosening of the skin, including inside the mouth  -signs and symptoms of bleeding such as bloody or black, tarry stools; red or dark-brown urine; spitting up blood or brown material that looks like coffee grounds; red spots on the skin; unusual bruising or bleeding from the eye, gums, or nose  Side effects that usually do not require medical attention (report to your doctor or health care professional if they continue or are bothersome):  -dizziness  -muscle pain  This list may not describe all possible side effects. Call your doctor for medical advice about side effects. You may report side effects to FDA at 0-879-FDA-2298.  Where should I keep my medicine?  Keep out of the reach of children.  Store at room temperature between 15 and 30 degrees C (59 and 86 degrees F). Throw away any unused medicine after the expiration date.  NOTE: This sheet is a summary. It may not cover all possible information. If you have questions about  this medicine, talk to your doctor, pharmacist, or health care provider.  © 2018 [employer] (2017-09-06 16:29:33)

## 2019-05-07 LAB
C TRACH DNA SPEC QL NAA+PROBE: NEGATIVE
HAV IGM SERPL QL IA: NEGATIVE
HBV CORE IGM SER QL: NEGATIVE
HBV SURFACE AG SER QL: NEGATIVE
HCV AB SER QL: REACTIVE
HIV 1+2 AB+HIV1 P24 AG SERPL QL IA: NON REACTIVE
N GONORRHOEA DNA SPEC QL NAA+PROBE: NEGATIVE
SPECIMEN SOURCE: NORMAL
TREPONEMA PALLIDUM IGG+IGM AB [PRESENCE] IN SERUM OR PLASMA BY IMMUNOASSAY: NON REACTIVE

## 2019-05-09 ENCOUNTER — TELEPHONE (OUTPATIENT)
Dept: VASCULAR LAB | Facility: MEDICAL CENTER | Age: 54
End: 2019-05-09

## 2019-05-09 LAB
EST. AVERAGE GLUCOSE BLD GHB EST-MCNC: 120 MG/DL
HBA1C MFR BLD: 5.8 % (ref 0–5.6)
HCV RNA SERPL NAA+PROBE-ACNC: NOT DETECTED IU/ML
HCV RNA SERPL NAA+PROBE-LOG IU: NOT DETECTED LOG IU/ML
HCV RNA SERPL QL NAA+PROBE: NOT DETECTED

## 2019-05-09 NOTE — TELEPHONE ENCOUNTER
Spoke with pt on the phone, he is at work. He reports the Eliquis and Xarelto were very expensive $500. He was unable to set up appt. But provided our contact information and he will call us back.     Darcy Lawrence, SarahiD

## 2019-05-14 ENCOUNTER — TELEPHONE (OUTPATIENT)
Dept: VASCULAR LAB | Facility: MEDICAL CENTER | Age: 54
End: 2019-05-14

## 2019-05-14 NOTE — TELEPHONE ENCOUNTER
[facility]     for pt to call clinic and establish care.    Sawyer Comer, PharmD

## 2019-05-17 ENCOUNTER — TELEPHONE (OUTPATIENT)
Dept: VASCULAR LAB | Facility: MEDICAL CENTER | Age: 54
End: 2019-05-17

## 2019-05-17 ENCOUNTER — ANTICOAGULATION VISIT (OUTPATIENT)
Dept: MEDICAL GROUP | Facility: PHYSICIAN GROUP | Age: 54
End: 2019-05-17
Payer: COMMERCIAL

## 2019-05-17 DIAGNOSIS — I48.91 ATRIAL FIBRILLATION, UNSPECIFIED TYPE (HCC): ICD-10-CM

## 2019-05-17 PROCEDURE — 99211 OFF/OP EST MAY X REQ PHY/QHP: CPT | Performed by: FAMILY MEDICINE

## 2019-05-17 ASSESSMENT — CHA2DS2 SCORE
HYPERTENSION: YES
AGE 65 TO 74: NO
VASCULAR DISEASE: NO
SEX: MALE
CHF OR LEFT VENTRICULAR DYSFUNCTION: NO
DIABETES: YES
AGE 75 OR GREATER: NO
PRIOR STROKE OR TIA OR THROMBOEMBOLISM: NO
CHA2DS2 VASC SCORE: 2

## 2019-05-17 NOTE — TELEPHONE ENCOUNTER
Initial anticoag note and most recent pcp note reviewed.  Patient with afib and chads vasc = 1    Pending further recommendations, we will continue with indefinite anticoagulation with xarelto per accp guidelines    Will defer all management of rhythm, rate, and other cv issues, aside from anticoagulation, to cards and/or pcp    Michael Bloch, MD  Anticoagulation Clinic    Cc:  STEPHANIE Diaz

## 2019-05-17 NOTE — PROGRESS NOTES
Anticoagulation Summary  As of 5/17/2019    INR goal:      TTR:   --   INR used for dosing:   No new INR was available at the time of this encounter.   Warfarin maintenance plan:   No maintenance plan   Next INR check:   5/31/2019   Target end date:   Indefinite    Indications    Atrial fibrillation (HCC) [I48.91]             Anticoagulation Episode Summary     INR check location:       Preferred lab:       Send INR reminders to:       Comments:         Anticoagulation Care Providers     Provider Role Specialty Phone number    Héctor Diaz M.D. Referring Family Medicine [phone]    [facility] Responsible  [phone]    Silverio Hays, PharmD Responsible          Anticoagulation Patient Findings       Pt is new to anticoagulation and new to [facility].  Explained our services, hours of operation, anticoagulation therapy, potential SE, potential DI.  Discussed monitoring parameters, such as blood in urine, blood in stool, discussed what to do if a dose is missed, or suspected as missed.  Emphasized importance of compliance including follow up. Discussed lifestyle choices of ETOH & smoking and its impact on therapy.    Pt signed new patient contract.  Copy will be scanned into media.      Pt denies any unusual s/s of bleeding, bruising, clotting or any changes to diet or medications.    CHADSVASC = 1 (HTN)    HASBLED= 1    HTN  Abnormal  Liver (cirrhosis, bilirubin 2x ULN, AST/ALT)    Kidney (SCr >2.6)  Stroke  Bleeding predisposition  Labile INR  Elderly (>65)  Drugs (Nsaid, antiplatelet, alcohol)    Added [facility] to care team    Patient presents to clinic today to establish care with anticoagulation services.  New onset atrial fibrillation found by PCP recently.  Denies personal hx of CVA/TIA or VTE.  States he has an older brother with diagnosed a-fib who is taking anticoagulation as well.    He checked on prices for both Xarelto and Eliquis, both found to be  unaffordable.  Discussed the many options for anticoagulation.  Ultimately, we provided samples of Xarelto to patient to begin taking today.  Renal indices support dose of Xarelto 20mg one time daily.    He was also provided with coupon card for patient assistance to determine if copay more affordable.  Will have him back to clinic in two weeks to discuss tolerability and affordability of Xarelto.  He also has follow up in place with PCP and will be establishing with cardiology in July.    Follow up:  Two weeks in clinic    Silverio Hays, PharmD    CC Dr. Michael Bloch

## 2019-05-30 DIAGNOSIS — I48.91 ATRIAL FIBRILLATION, UNSPECIFIED TYPE (HCC): ICD-10-CM

## 2019-05-30 NOTE — TELEPHONE ENCOUNTER
Pt called to report there was an issue with picking up Xarelto. Spoke with [facility], insurance needs a prior authorizations . They will send it over to our to our clinic. He ran out of Xarelto yesterday. Has appt tomorrow and will get more samples at that time if available.     Darcy Lawrence, Pharm D

## 2019-05-31 ENCOUNTER — ANTICOAGULATION VISIT (OUTPATIENT)
Dept: MEDICAL GROUP | Facility: PHYSICIAN GROUP | Age: 54
End: 2019-05-31
Payer: COMMERCIAL

## 2019-05-31 VITALS
BODY MASS INDEX: 29.9 KG/M2 | SYSTOLIC BLOOD PRESSURE: 111 MMHG | RESPIRATION RATE: 20 BRPM | DIASTOLIC BLOOD PRESSURE: 63 MMHG | WEIGHT: 236 LBS | HEART RATE: 75 BPM

## 2019-05-31 DIAGNOSIS — Z79.01 CHRONIC ANTICOAGULATION: ICD-10-CM

## 2019-05-31 LAB — INR PPP: 1 (ref 2–3.5)

## 2019-05-31 PROCEDURE — 85610 PROTHROMBIN TIME: CPT | Performed by: INTERNAL MEDICINE

## 2019-05-31 PROCEDURE — 99211 OFF/OP EST MAY X REQ PHY/QHP: CPT | Performed by: INTERNAL MEDICINE

## 2019-05-31 NOTE — PROGRESS NOTES
Target end date:indefinite     Indication: AFib.     Drug: Xarelto 20 mg daily     CHADsVASC = 1 (HTN)    Health Status Since Last Assessment   Patient denies any new relevant medical problems, ED visits or hospitalizations   Patient denies any embolic events (stroke/tia/systemic embolism)    Adherence with DOAC Therapy   Pt has 1-2 missed any doses in the average week, only because he can't afford the medication.     Bleeding Risk Assessment     Denies Epistaxis   Pt denies any excessive or unusual bleeding/hematomas.  Pt denies any GI bleeds or hematemesis.  Pt denies any concerning daily headache or sub dural hematoma symptoms.     Pt denies any hematuria or abnormal vaginal bleeding.   Latest Hemoglobin 16.7   ETOH overuse minimal      Creatinine Clearance/Renal Function     Latest ClCr >50 mL/min     Drug Interactions   Platelets: 222   ASA/other antiplatelets none   NSAID none   Other drug interactions none   X Verified no anticonvulsant or azole therapy, education provided for future use.     Examination   Blood Pressure WNL   Symptomatic hypotension no   Significant gait impairment/imbalance/high risk for falls? No obvious risks.     Final Assessment and Recommendations:   Patient appears stable from the anticoagulation staindpoint.     Given ChadsVasc of ~1, requested pt to follow up with cardiology on July 19th to determine if long term anticoagulation is necessary.     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  He will require samples in the mean time.  Pt might need samples until he is seen by cardiology (when he will be evaluated if OAC is necessary with his ChadsVasc score)       Other Actions: provided 1 week samples, will  additional samples next week if Union hasn't obtained medication at affordable rate.     Follow up:   Will follow up with patient 2  months.      Sawyer Comer, PharmD

## 2019-06-03 ENCOUNTER — TELEPHONE (OUTPATIENT)
Dept: MEDICAL GROUP | Facility: PHYSICIAN GROUP | Age: 54
End: 2019-06-03

## 2019-06-05 DIAGNOSIS — I10 BENIGN HYPERTENSION: ICD-10-CM

## 2019-06-06 RX ORDER — AMLODIPINE BESYLATE 10 MG/1
TABLET ORAL
Qty: 90 TAB | Refills: 0 | Status: SHIPPED | OUTPATIENT
Start: 2019-06-06 | End: 2019-08-26 | Stop reason: SDUPTHER

## 2019-06-06 NOTE — TELEPHONE ENCOUNTER
Was the patient seen in the last year in this department? Yes    Does patient have an active prescription for medications requested? No     Received Request Via: Pharmacy    Pt met protocol?: Yes     Last OV 05/06/2019    BP Readings from Last 1 Encounters:   05/31/19 111/63

## 2019-06-07 ENCOUNTER — ANTICOAGULATION MONITORING (OUTPATIENT)
Dept: VASCULAR LAB | Facility: MEDICAL CENTER | Age: 54
End: 2019-06-07

## 2019-06-07 ENCOUNTER — TELEPHONE (OUTPATIENT)
Dept: VASCULAR LAB | Facility: MEDICAL CENTER | Age: 54
End: 2019-06-07

## 2019-06-07 DIAGNOSIS — I48.91 ATRIAL FIBRILLATION, UNSPECIFIED TYPE (HCC): ICD-10-CM

## 2019-06-07 NOTE — PROGRESS NOTES
Patient came in stating that he needed more samples of Xarelto, spoke with Silverio Hays PharmD who is ok with me giving patient a week supply of Xarelto 20mg and then patient needs to contact pharmacy to  rx. Looks like per previous notes the pa has been approved.

## 2019-06-11 ENCOUNTER — OFFICE VISIT (OUTPATIENT)
Dept: MEDICAL GROUP | Facility: PHYSICIAN GROUP | Age: 54
End: 2019-06-11
Payer: COMMERCIAL

## 2019-06-11 VITALS
SYSTOLIC BLOOD PRESSURE: 132 MMHG | HEART RATE: 72 BPM | HEIGHT: 75 IN | BODY MASS INDEX: 29.84 KG/M2 | RESPIRATION RATE: 12 BRPM | TEMPERATURE: 98.1 F | WEIGHT: 240 LBS | OXYGEN SATURATION: 96 % | DIASTOLIC BLOOD PRESSURE: 76 MMHG

## 2019-06-11 DIAGNOSIS — F41.9 ANXIETY: ICD-10-CM

## 2019-06-11 DIAGNOSIS — Z51.81 MEDICATION MONITORING ENCOUNTER: ICD-10-CM

## 2019-06-11 DIAGNOSIS — G47.30 SLEEP APNEA, UNSPECIFIED TYPE: ICD-10-CM

## 2019-06-11 DIAGNOSIS — I48.91 ATRIAL FIBRILLATION, UNSPECIFIED TYPE (HCC): ICD-10-CM

## 2019-06-11 DIAGNOSIS — B18.2 CHRONIC HEPATITIS C WITHOUT HEPATIC COMA (HCC): ICD-10-CM

## 2019-06-11 DIAGNOSIS — L03.031 CELLULITIS OF TOE OF RIGHT FOOT: ICD-10-CM

## 2019-06-11 DIAGNOSIS — E55.9 VITAMIN D DEFICIENCY: ICD-10-CM

## 2019-06-11 DIAGNOSIS — R73.9 HYPERGLYCEMIA: ICD-10-CM

## 2019-06-11 DIAGNOSIS — I10 ESSENTIAL HYPERTENSION: ICD-10-CM

## 2019-06-11 DIAGNOSIS — E53.8 VITAMIN B12 DEFICIENCY: ICD-10-CM

## 2019-06-11 DIAGNOSIS — L84 CORN OF TOE: ICD-10-CM

## 2019-06-11 DIAGNOSIS — Z71.6 ENCOUNTER FOR SMOKING CESSATION COUNSELING: ICD-10-CM

## 2019-06-11 DIAGNOSIS — B35.1 ONYCHOMYCOSIS: ICD-10-CM

## 2019-06-11 DIAGNOSIS — E78.5 DYSLIPIDEMIA: ICD-10-CM

## 2019-06-11 DIAGNOSIS — F17.200 SMOKER: ICD-10-CM

## 2019-06-11 DIAGNOSIS — Z12.11 SCREEN FOR COLON CANCER: ICD-10-CM

## 2019-06-11 DIAGNOSIS — Z78.9 ALCOHOL USE: ICD-10-CM

## 2019-06-11 DIAGNOSIS — R73.03 PREDIABETES: ICD-10-CM

## 2019-06-11 DIAGNOSIS — I10 BENIGN HYPERTENSION: ICD-10-CM

## 2019-06-11 PROBLEM — I49.9 IRREGULAR HEART BEAT: Status: RESOLVED | Noted: 2019-05-06 | Resolved: 2019-06-11

## 2019-06-11 PROCEDURE — 99215 OFFICE O/P EST HI 40 MIN: CPT | Mod: 25 | Performed by: FAMILY MEDICINE

## 2019-06-11 PROCEDURE — 96372 THER/PROPH/DIAG INJ SC/IM: CPT | Performed by: FAMILY MEDICINE

## 2019-06-11 RX ORDER — CEPHALEXIN 500 MG/1
500 CAPSULE ORAL 4 TIMES DAILY
Qty: 56 CAP | Refills: 0 | Status: SHIPPED | OUTPATIENT
Start: 2019-06-11 | End: 2019-06-25

## 2019-06-11 RX ORDER — FLUOXETINE HYDROCHLORIDE 20 MG/1
20 CAPSULE ORAL DAILY
Qty: 90 CAP | Refills: 0 | Status: SHIPPED | OUTPATIENT
Start: 2019-06-11 | End: 2021-03-08 | Stop reason: SDUPTHER

## 2019-06-11 RX ORDER — ATORVASTATIN CALCIUM 20 MG/1
20 TABLET, FILM COATED ORAL EVERY EVENING
Qty: 90 TAB | Refills: 0 | Status: SHIPPED | OUTPATIENT
Start: 2019-06-11 | End: 2021-03-08

## 2019-06-11 RX ORDER — CYANOCOBALAMIN 1000 UG/ML
1000 INJECTION, SOLUTION INTRAMUSCULAR; SUBCUTANEOUS ONCE
Status: COMPLETED | OUTPATIENT
Start: 2019-06-11 | End: 2019-06-11

## 2019-06-11 RX ORDER — ERGOCALCIFEROL 1.25 MG/1
CAPSULE ORAL
Qty: 12 CAP | Refills: 1 | Status: SHIPPED | OUTPATIENT
Start: 2019-06-11 | End: 2021-03-08

## 2019-06-11 RX ORDER — METOPROLOL SUCCINATE 50 MG/1
50 TABLET, EXTENDED RELEASE ORAL DAILY
Qty: 90 TAB | Refills: 0 | Status: SHIPPED | OUTPATIENT
Start: 2019-06-11 | End: 2021-03-08 | Stop reason: SDUPTHER

## 2019-06-11 RX ORDER — SULFAMETHOXAZOLE AND TRIMETHOPRIM 800; 160 MG/1; MG/1
1 TABLET ORAL 2 TIMES DAILY
Qty: 28 TAB | Refills: 0 | Status: SHIPPED | OUTPATIENT
Start: 2019-06-11 | End: 2019-06-25

## 2019-06-11 RX ADMIN — CYANOCOBALAMIN 1000 MCG: 1000 INJECTION, SOLUTION INTRAMUSCULAR; SUBCUTANEOUS at 15:07

## 2019-06-11 NOTE — PATIENT INSTRUCTIONS
Diagnoses and all orders for this visit:    Atrial fibrillation, unspecified type (HCC)  -     metoprolol SR (TOPROL XL) 50 MG TABLET SR 24 HR; Take 1 Tab by mouth every day.    Benign hypertension  -     metoprolol SR (TOPROL XL) 50 MG TABLET SR 24 HR; Take 1 Tab by mouth every day.    Smoker    Alcohol use    Medication monitoring encounter    Sleep apnea, unspecified type    BMI 30.0-30.9,adult  -     Patient identified as having weight management issue.  Appropriate orders and counseling given.    Encounter for smoking cessation counseling  -     REFERRAL TO PHARMACOTHERAPY SERVICE    Chronic hepatitis C without hepatic coma (HCC)  -     REFERRAL TO GI FOR COLONOSCOPY    Prediabetes    Hyperglycemia    Dyslipidemia  -     atorvastatin (LIPITOR) 20 MG Tab; Take 1 Tab by mouth every evening.    Vitamin D deficiency  -     ergocalciferol (DRISDOL) 20642 UNIT capsule; Take one tab po once a week.    Vitamin B12 deficiency  -     cyanocobalamin (VITAMIN B-12) injection 1,000 mcg; 1 mL by Intramuscular route Once.    Screen for colon cancer  -     REFERRAL TO GI FOR COLONOSCOPY    Anxiety  -     FLUoxetine (PROZAC) 20 MG Cap; Take 1 Cap by mouth every day.  -     REFERRAL TO BEHAVIORAL HEALTH    Essential hypertension    Cellulitis of toe of right foot  -     REFERRAL TO PODIATRY  -     sulfamethoxazole-trimethoprim (BACTRIM DS) 800-160 MG tablet; Take 1 Tab by mouth 2 times a day for 14 days.  -     cephALEXin (KEFLEX) 500 MG Cap; Take 1 Cap by mouth 4 times a day for 14 days.    Corn of toe  -     REFERRAL TO PODIATRY    Onychomycosis  -     REFERRAL TO PODIATRY    -Complete blood count within normal limits, complete metabolic panel within normal limits, A1c 5.8, TSH thyroid within normal limits, lipids with LDL high at 118, vitamin D low at 20, vitamin B12 at 236, hepatitis C-reactive, hepatitis C viral load not detected, HIV negative, chlamydia and gonorrhea negative, syphilis negative, kidney GFR within normal  limits.   -He will call radiology at [phone] to get his imaging echo for his heart, chest x-ray, and right upper quadrant liver ultrasound which he forgot.  He will follow-up and make sure to see cardiology for his new onset A. fib and sleep study and continue to use his CPAP.  He will try to work on losing 5 to 10 pounds a month but not too drastically and can try apps such as lose it, weight watchers or my fitness tracker to track his diet and caloric intake and look at food labels.  Please read patient instruction section on prediabetes and steps to take to reverse this and prevent diabetes and we will recheck his A1c in 3 months along with his vitamin D and B12.  He will wear compression stockings as well and get a blood pressure cuff and check the top number and bottom number systolic and diastolic pressure and heart rate and record this for me sometimes in the morning, afternoon, evening in both arms.  We will change his Lopressor 25 mg twice daily to metoprolol extended release 50 mg once daily.  We will start him on Prozac 20 mg for his anxiety and he will also go see a counselor and at least try this at one time and try candice such as head space candice to help with his anxiety.  He will continue to see [facility] for smoking sensation and he is down to 1 cigarette a day and 1 can of chewing tobacco every 2 weeks which is much better and sees his dentist.  He will continue Xarelto 20 mg for his new onset A. fib and see the cardiologist and get his other imaging done.  We will see what his liver ultrasound done to see if he needs to see his GI doctor again for hepatitis C but viral load is undetected and we will send him for colon cancer screening to the GI.  We will start him on new medication atorvastatin 20 mg daily for his cholesterol due to his increased risk and increase 8 at CVD risk to due to his blood pressure and A. fib.  He will start taking also vitamin D 50,000 units once weekly for 3 months and  vitamin B12 1000 mcg injection given today and can continue to take vitamin B12 500 mcg daily over-the-counter.  We will consider decreasing his amlodipine to 5 mg after he checks his blood pressure and heart rate and try another medication if he keeps getting swelling in his legs.  For his cellulitis of his toe we will do to antibiotics Bactrim twice a day with food for 2 weeks and cephalexin 500 mg 4 times a day so every 6 hours with some food and can take some probiotics to avoid any diarrhea and also do this for 2 weeks and will send him to see a podiatrist for his corns and foot care due to his prediabetes and possible poor wound healing due to prediabetes, A. fib, and smoking.  He will see me back in about 4 to 6 weeks for his anxiety and blood pressure and earlier if he cannot get into see the podiatrist or if his wound gets worse and ER precautions given if any increased swelling, redness, streaking, bruising, increase of infection while on antibiotic please go to the ER right away as you might need IV antibiotics.    Return in about 6 weeks (around 7/23/2019), or Anxiety/Meds/Afib/HTN, for High blood pressure.    How to Use Compression Stockings  Compression stockings are elastic socks that squeeze the legs. They help to increase blood flow to the legs, decrease swelling in the legs, and reduce the chance of developing blood clots in the lower legs. Compression stockings are often used by people who:  · Are recovering from surgery.  · Have poor circulation in their legs.  · Are prone to getting blood clots in their legs.  · Have varicose veins.  · Sit or stay in bed for long periods of time.  How to use compression stockings  Before you put on your compression stockings:  · Make sure that they are the correct size. If you do not know your size, ask your health care provider.  · Make sure that they are clean, dry, and in good condition.  · Check them for rips and tears. Do not put them on if they are ripped  or torn.  Put your stockings on first thing in the morning, before you get out of bed. Keep them on for as long as your health care provider advises. When you are wearing your stockings:  · Keep them as smooth as possible. Do not allow them to bunch up. It is especially important to prevent the stockings from bunching up around your toes or behind your knees.  · Do not roll the stockings downward and leave them rolled down. This can decrease blood flow to your leg.  · Change them right away if they become wet or dirty.  When you take off your stockings, inspect your legs and feet. Anything that does not seem normal may require medical attention. Look for:  · Open sores.  · Red spots.  · Swelling.  Information and tips  · Do not stop wearing your compression stockings without talking to your health care provider first.  · Wash your stockings every day with mild detergent in cold or warm water. Do not use bleach. Air-dry your stockings or dry them in a clothes dryer on low heat.  · Replace your stockings every 3-6 months.  · If skin moisturizing is part of your treatment plan, apply lotion or cream at night so that your skin will be dry when you put on the stockings in the morning. It is harder to put the stockings on when you have lotion on your legs or feet.  Contact a health care provider if:  Remove your stockings and seek medical care if:  · You have a feeling of pins and needles in your feet or legs.  · You have any new changes in your skin.  · You have skin lesions that are getting worse.  · You have swelling or pain that is getting worse.  Get help right away if:  · You have numbness or tingling in your lower legs that does not get better right after you take the stockings off.  · Your toes or feet become cold and blue.  · You develop open sores or red spots on your legs that do not go away.  · You see or feel a warm spot on your leg.  · You have new swelling or soreness in your leg.  · You are short of breath  or you have chest pain for no reason.  · You have a rapid or irregular heartbeat.  · You feel light-headed or dizzy.  This information is not intended to replace advice given to you by your health care provider. Make sure you discuss any questions you have with your health care provider.    Atrial Fibrillation  Atrial fibrillation is a type of irregular or rapid heartbeat (arrhythmia). In atrial fibrillation, the heart quivers continuously in a chaotic pattern. This occurs when parts of the heart receive disorganized signals that make the heart unable to pump blood normally. This can increase the risk for stroke, heart failure, and other heart-related conditions. There are different types of atrial fibrillation, including:  · Paroxysmal atrial fibrillation. This type starts suddenly, and it usually stops on its own shortly after it starts.  · Persistent atrial fibrillation. This type often lasts longer than a week. It may stop on its own or with treatment.  · Long-lasting persistent atrial fibrillation. This type lasts longer than 12 months.  · Permanent atrial fibrillation. This type does not go away.  Talk with your health care provider to learn about the type of atrial fibrillation that you have.  What are the causes?  This condition is caused by some heart-related conditions or procedures, including:  · A heart attack.  · Coronary artery disease.  · Heart failure.  · Heart valve conditions.  · High blood pressure.  · Inflammation of the sac that surrounds the heart (pericarditis).  · Heart surgery.  · Certain heart rhythm disorders, such as Stover-Parkinson-White syndrome.  Other causes include:  · Pneumonia.  · Obstructive sleep apnea.  · Blockage of an artery in the lungs (pulmonary embolism, or PE).  · Lung cancer.  · Chronic lung disease.  · Thyroid problems, especially if the thyroid is overactive (hyperthyroidism).  · Caffeine.  · Excessive alcohol use or illegal drug use.  · Use of some medicines, including  certain decongestants and diet pills.  Sometimes, the cause cannot be found.  What increases the risk?  This condition is more likely to develop in:  · People who are older in age.  · People who smoke.  · People who have diabetes mellitus.  · People who are overweight (obese).  · Athletes who exercise vigorously.  What are the signs or symptoms?  Symptoms of this condition include:  · A feeling that your heart is beating rapidly or irregularly.  · A feeling of discomfort or pain in your chest.  · Shortness of breath.  · Sudden light-headedness or weakness.  · Getting tired easily during exercise.  In some cases, there are no symptoms.  How is this diagnosed?  Your health care provider may be able to detect atrial fibrillation when taking your pulse. If detected, this condition may be diagnosed with:  · An electrocardiogram (ECG).  · A Holter monitor test that records your heartbeat patterns over a 24-hour period.  · Transthoracic echocardiogram (TTE) to evaluate how blood flows through your heart.  · Transesophageal echocardiogram (NICOLE) to view more detailed images of your heart.  · A stress test.  · Imaging tests, such as a CT scan or chest X-ray.  · Blood tests.  How is this treated?  The main goals of treatment are to prevent blood clots from forming and to keep your heart beating at a normal rate and rhythm. The type of treatment that you receive depends on many factors, such as your underlying medical conditions and how you feel when you are experiencing atrial fibrillation.  This condition may be treated with:  · Medicine to slow down the heart rate, bring the heart’s rhythm back to normal, or prevent clots from forming.  · Electrical cardioversion. This is a procedure that resets your heart’s rhythm by delivering a controlled, low-energy shock to the heart through your skin.  · Different types of ablation, such as catheter ablation, catheter ablation with pacemaker, or surgical ablation. These procedures  destroy the heart tissues that send abnormal signals. When the pacemaker is used, it is placed under your skin to help your heart beat in a regular rhythm.  Follow these instructions at home:  · Take over-the counter and prescription medicines only as told by your health care provider.  · If your health care provider prescribed a blood-thinning medicine (anticoagulant), take it exactly as told. Taking too much blood-thinning medicine can cause bleeding. If you do not take enough blood-thinning medicine, you will not have the protection that you need against stroke and other problems.  · Do not use tobacco products, including cigarettes, chewing tobacco, and e-cigarettes. If you need help quitting, ask your health care provider.  · If you have obstructive sleep apnea, manage your condition as told by your health care provider.  · Do not drink alcohol.  · Do not drink beverages that contain caffeine, such as coffee, soda, and tea.  · Maintain a healthy weight. Do not use diet pills unless your health care provider approves. Diet pills may make heart problems worse.  · Follow diet instructions as told by your health care provider.  · Exercise regularly as told by your health care provider.  · Keep all follow-up visits as told by your health care provider. This is important.  How is this prevented?  · Avoid drinking beverages that contain caffeine or alcohol.  · Avoid certain medicines, especially medicines that are used for breathing problems.  · Avoid certain herbs and herbal medicines, such as those that contain ephedra or ginseng.  · Do not use illegal drugs, such as cocaine and amphetamines.  · Do not smoke.  · Manage your high blood pressure.  Contact a health care provider if:  · You notice a change in the rate, rhythm, or strength of your heartbeat.  · You are taking an anticoagulant and you notice increased bruising.  · You tire more easily when you exercise or exert yourself.  Get help right away if:  · You  have chest pain, abdominal pain, sweating, or weakness.  · You feel nauseous.  · You notice blood in your vomit, bowel movement, or urine.  · You have shortness of breath.  · You suddenly have swollen feet and ankles.  · You feel dizzy.  · You have sudden weakness or numbness of the face, arm, or leg, especially on one side of the body.  · You have trouble speaking, trouble understanding, or both (aphasia).  · Your face or your eyelid droops on one side.  These symptoms may represent a serious problem that is an emergency. Do not wait to see if the symptoms will go away. Get medical help right away. Call your local emergency services (911 in the [place] Do not drive yourself to the hospital.   This information is not intended to replace advice given to you by your health care provider. Make sure you discuss any questions you have with your health care provider.  Document Released: 12/18/2006 Document Revised: 04/26/2017 Document Reviewed: 04/13/2016  [employer] Interactive Patient Education © 2017 [employer]  Introduction  Your blood pressure on this visit to the emergency department or clinic is elevated. This does not necessarily mean you have high blood pressure (hypertension), but it does mean that your blood pressure needs to be rechecked. Many times your blood pressure can increase due to illness, pain, anxiety, or other factors.  We recommend that you get a series of blood pressure readings done over a period of 5 days. It is best to get a reading in the morning and one in the evening. You should make sure to sit and relax for 1-5 minutes before the reading is taken. Write the readings down and make a follow-up appointment with your health care provider to discuss the results. If there is not a free clinic or a drug store with a blood-pressure-taking machine near you, you can purchase blood-pressure-taking equipment from a drug store. Having one in the home allows you the convenience of taking your blood  pressure while you are home and relaxed.  BLOOD PRESSURE LOG   Date: _______________________  · a.m. _____________________  · p.m. _____________________  Date: _______________________  · a.m. _____________________  · p.m. _____________________  Date: _______________________  · a.m. _____________________  · p.m. _____________________  Date: _______________________  · a.m. _____________________  · p.m. _____________________  Date: _______________________  · a.m. _____________________  · p.m. _____________________  This information is not intended to replace advice given to you by your health care provider. Make sure you discuss any questions you have with your health care provider.  Document Released: 09/15/2004 Document Revised: 07/07/2017 Document Reviewed: 02/10/2015  © 2017 [employer]    Preventing Type 2 Diabetes Mellitus  Type 2 diabetes (type 2 diabetes mellitus) is a long-term (chronic) disease that affects blood sugar (glucose) levels. Normally, a hormone called insulin allows glucose to enter cells in the body. The cells use glucose for energy. In type 2 diabetes, one or both of these problems may be present:  · The body does not make enough insulin.  · The body does not respond properly to insulin that it makes (insulin resistance).  Insulin resistance or lack of insulin causes excess glucose to build up in the blood instead of going into cells. As a result, high blood glucose (hyperglycemia) develops, which can cause many complications. Being overweight or obese and having an inactive (sedentary) lifestyle can increase your risk for diabetes. Type 2 diabetes can be delayed or prevented by making certain nutrition and lifestyle changes.  What nutrition changes can be made?  · Eat healthy meals and snacks regularly. Keep a healthy snack with you for when you get hungry between meals, such as fruit or a handful of nuts.  · Eat lean meats and proteins that are low in saturated fats, such as chicken, fish, egg  whites, and beans. Avoid processed meats.  · Eat plenty of fruits and vegetables and plenty of grains that have not been processed (whole grains). It is recommended that you eat:  ¨ 1½?2 cups of fruit every day.  ¨ 2½?3 cups of vegetables every day.  ¨ 6?8 oz of whole grains every day, such as oats, whole wheat, bulgur, brown rice, quinoa, and millet.  · Eat low-fat dairy products, such as milk, yogurt, and cheese.  · Eat foods that contain healthy fats, such as nuts, avocado, olive oil, and canola oil.  · Drink water throughout the day. Avoid drinks that contain added sugar, such as soda or sweet tea.  · Follow instructions from your health care provider about specific eating or drinking restrictions.  · Control how much food you eat at a time (portion size).  ¨ Check food labels to find out the serving sizes of foods.  ¨ Use a kitchen scale to weigh amounts of foods.  · Saute or steam food instead of frying it. Cook with water or broth instead of oils or butter.  · Limit your intake of:  ¨ Salt (sodium). Have no more than 1 tsp (2,400 mg) of sodium a day. If you have heart disease or high blood pressure, have less than ½?¾ tsp (1,500 mg) of sodium a day.  ¨ Saturated fat. This is fat that is solid at room temperature, such as butter or fat on meat.  What lifestyle changes can be made?   Activity  · Do moderate-intensity physical activity for at least 30 minutes on at least 5 days of the week, or as much as told by your health care provider.  · Ask your health care provider what activities are safe for you. A mix of physical activities may be best, such as walking, swimming, cycling, and strength training.  · Try to add physical activity into your day. For example:  ¨ Park in spots that are farther away than usual, so that you walk more. For example, park in a far corner of the parking lot when you go to the office or the grocery store.  ¨ Take a walk during your lunch break.  ¨ Use stairs instead of elevators or  escalators.  Weight Loss  · Lose weight as directed. Your health care provider can determine how much weight loss is best for you and can help you lose weight safely.  · If you are overweight or obese, you may be instructed to lose at least 5?7 % of your body weight.  Alcohol and Tobacco   · Limit alcohol intake to no more than 1 drink a day for nonpregnant women and 2 drinks a day for men. One drink equals 12 oz of beer, 5 oz of wine, or 1½ oz of hard liquor.  · Do not use any tobacco products, such as cigarettes, chewing tobacco, and e-cigarettes. If you need help quitting, ask your health care provider.  Work With Your Health Care Provider  · Have your blood glucose tested regularly, as told by your health care provider.  · Discuss your risk factors and how you can reduce your risk for diabetes.  · Get screening tests as told by your health care provider. You may have screening tests regularly, especially if you have certain risk factors for type 2 diabetes.  · Make an appointment with a diet and nutrition specialist (registered dietitian). A registered dietitian can help you make a healthy eating plan and can help you understand portion sizes and food labels.  Why are these changes important?  · It is possible to prevent or delay type 2 diabetes and related health problems by making lifestyle and nutrition changes.  · It can be difficult to recognize signs of type 2 diabetes. The best way to avoid possible damage to your body is to take actions to prevent the disease before you develop symptoms.  What can happen if changes are not made?  · Your blood glucose levels may keep increasing. Having high blood glucose for a long time is dangerous. Too much glucose in your blood can damage your blood vessels, heart, kidneys, nerves, and eyes.  · You may develop prediabetes or type 2 diabetes. Type 2 diabetes can lead to many chronic health problems and complications, such as:  ¨ Heart  disease.  ¨ Stroke.  ¨ Blindness.  ¨ Kidney disease.  ¨ Depression.  ¨ Poor circulation in the feet and legs, which could lead to surgical removal (amputation) in severe cases.  Where to find support:  · Ask your health care provider to recommend a registered dietitian, diabetes educator, or weight loss program.  · Look for local or online weight loss groups.  · Join a gym, fitness club, or outdoor activity group, such as a walking club.  Where to find more information:  To learn more about diabetes and diabetes prevention, visit:  · American Diabetes Association (ADA): www.diabetes.org  · National [place] of Diabetes and Digestive and Kidney Diseases: www.niddk.nih.gov/health-information/diabetes  To learn more about healthy eating, visit:  · The [place] Department of Agriculture ([employer]), Choose My Plate: www.Intrallect.gov/food-groups  · Office of Disease Prevention and Health Promotion (ODPHP), Dietary Guidelines: www.health.gov/dietaryguidelines  Summary  · You can reduce your risk for type 2 diabetes by increasing your physical activity, eating healthy foods, and losing weight as directed.  · Talk with your health care provider about your risk for type 2 diabetes. Ask about any blood tests or screening tests that you need to have.  This information is not intended to replace advice given to you by your health care provider. Make sure you discuss any questions you have with your health care provider.  Document Released: 04/10/2017 Document Revised: 05/25/2017 Document Reviewed: 02/07/2017  [employer] Interactive Patient Education © 2017 [employer]    Prediabetes  Prediabetes is the condition of having a blood sugar (blood glucose) level that is higher than it should be, but not high enough for you to be diagnosed with type 2 diabetes. Having prediabetes puts you at risk for developing type 2 diabetes (type 2 diabetes mellitus). Prediabetes may be called impaired glucose tolerance or impaired fasting  glucose.  Prediabetes usually does not cause symptoms. Your health care provider can diagnose this condition with blood tests. You may be tested for prediabetes if you are overweight and if you have at least one other risk factor for prediabetes.  Risk factors for prediabetes include:  · Having a family member with type 2 diabetes.  · Being overweight or obese.  · Being older than age 45.  · Being of American-, -American, /, or / descent.  · Having an inactive (sedentary) lifestyle.  · Having a history of gestational diabetes or polycystic ovarian syndrome (PCOS).  · Having low levels of good cholesterol (HDL-C) or high levels of blood fats (triglycerides).  · Having high blood pressure.  What is blood glucose and how is blood glucose measured?     Blood glucose refers to the amount of glucose in your bloodstream. Glucose comes from eating foods that contain sugars and starches (carbohydrates) that the body breaks down into glucose. Your blood glucose level may be measured in mg/dL (milligrams per deciliter) or mmol/L (millimoles per liter). Your blood glucose may be checked with one or more of the following blood tests:  · A fasting blood glucose (FBG) test. You will not be allowed to eat (you will fast) for at least 8 hours before a blood sample is taken.  ¨ A normal range for FBG is  mg/dl (3.9-5.6 mmol/L).  · An A1c (hemoglobin A1c) blood test. This test provides information about blood glucose control over the previous 2?3 months.  · An oral glucose tolerance test (OGTT). This test measures your blood glucose twice:  ¨ After fasting. This is your baseline level.  ¨ Two hours after you drink a beverage that contains glucose.  You may be diagnosed with prediabetes:  · If your FBG is 100?125 mg/dL (5.6-6.9 mmol/L).  · If your A1c level is 5.7?6.4%.  · If your OGGT result is 140?199 mg/dL (7.8-11 mmol/L).  These blood tests may be repeated to confirm your  diagnosis.  What happens if blood glucose is too high?  The pancreas produces a hormone (insulin) that helps move glucose from the bloodstream into cells. When cells in the body do not respond properly to insulin that the body makes (insulin resistance), excess glucose builds up in the blood instead of going into cells. As a result, high blood glucose (hyperglycemia) can develop, which can cause many complications. This is a symptom of prediabetes.  What can happen if blood glucose stays higher than normal for a long time?  Having high blood glucose for a long time is dangerous. Too much glucose in your blood can damage your nerves and blood vessels. Long-term damage can lead to complications from diabetes, which may include:  · Heart disease.  · Stroke.  · Blindness.  · Kidney disease.  · Depression.  · Poor circulation in the feet and legs, which could lead to surgical removal (amputation) in severe cases.  How can prediabetes be prevented from turning into type 2 diabetes?     To help prevent type 2 diabetes, take the following actions:  · Be physically active.  ¨ Do moderate-intensity physical activity for at least 30 minutes on at least 5 days of the week, or as much as told by your health care provider. This could be brisk walking, biking, or water aerobics.  ¨ Ask your health care provider what activities are safe for you. A mix of physical activities may be best, such as walking, swimming, cycling, and strength training.  · Lose weight as told by your health care provider.  ¨ Losing 5-7% of your body weight can reverse insulin resistance.  ¨ Your health care provider can determine how much weight loss is best for you and can help you lose weight safely.  · Follow a healthy meal plan. This includes eating lean proteins, complex carbohydrates, fresh fruits and vegetables, low-fat dairy products, and healthy fats.  ¨ Follow instructions from your health care provider about eating or drinking  restrictions.  ¨ Make an appointment to see a diet and nutrition specialist (registered dietitian) to help you create a healthy eating plan that is right for you.  · Do not smoke or use any tobacco products, such as cigarettes, chewing tobacco, and e-cigarettes. If you need help quitting, ask your health care provider.  · Take over-the-counter and prescription medicines as told by your health care provider. You may be prescribed medicines that help lower the risk of type 2 diabetes.  This information is not intended to replace advice given to you by your health care provider. Make sure you discuss any questions you have with your health care provider.  Document Released: 04/10/2017 Document Revised: 05/25/2017 Document Reviewed: 02/07/2017  [employer] Interactive Patient Education © 2017 [employer]      Cellulitis, Adult  Cellulitis is a skin infection. The infected area is usually red and tender. This condition occurs most often in the arms and lower legs. The infection can travel to the muscles, blood, and underlying tissue and become serious. It is very important to get treated for this condition.  What are the causes?  Cellulitis is caused by bacteria. The bacteria enter through a break in the skin, such as a cut, burn, insect bite, open sore, or crack.  What increases the risk?  This condition is more likely to occur in people who:  · Have a weak defense system (immune system).  · Have open wounds on the skin such as cuts, burns, bites, and scrapes. Bacteria can enter the body through these open wounds.  · Are older.  · Have diabetes.  · Have a type of long-lasting (chronic) liver disease (cirrhosis) or kidney disease.  · Use IV drugs.  What are the signs or symptoms?  Symptoms of this condition include:  · Redness, streaking, or spotting on the skin.  · Swollen area of the skin.  · Tenderness or pain when an area of the skin is touched.  · Warm skin.  · Fever.  · Chills.  · Blisters.  How is this  diagnosed?  This condition is diagnosed based on a medical history and physical exam. You may also have tests, including:  · Blood tests.  · Lab tests.  · Imaging tests.  How is this treated?  Treatment for this condition may include:  · Medicines, such as antibiotic medicines or antihistamines.  · Supportive care, such as rest and application of cold or warm cloths (cold or warm compresses) to the skin.  · Hospital care, if the condition is severe.  The infection usually gets better within 1-2 days of treatment.  Follow these instructions at home:  · Take over-the-counter and prescription medicines only as told by your health care provider.  · If you were prescribed an antibiotic medicine, take it as told by your health care provider. Do not stop taking the antibiotic even if you start to feel better.  · Drink enough fluid to keep your urine clear or pale yellow.  · Do not touch or rub the infected area.  · Raise (elevate) the infected area above the level of your heart while you are sitting or lying down.  · Apply warm or cold compresses to the affected area as told by your health care provider.  · Keep all follow-up visits as told by your health care provider. This is important. These visits let your health care provider make sure a more serious infection is not developing.  Contact a health care provider if:  · You have a fever.  · Your symptoms do not improve within 1-2 days of starting treatment.  · Your bone or joint underneath the infected area becomes painful after the skin has healed.  · Your infection returns in the same area or another area.  · You notice a swollen bump in the infected area.  · You develop new symptoms.  · You have a general ill feeling (malaise) with muscle aches and pains.  Get help right away if:  · Your symptoms get worse.  · You feel very sleepy.  · You develop vomiting or diarrhea that persists.  · You notice red streaks coming from the infected area.  · Your red area gets larger  or turns dark in color.  This information is not intended to replace advice given to you by your health care provider. Make sure you discuss any questions you have with your health care provider.  Document Released: 09/27/2006 Document Revised: 04/27/2017 Document Reviewed: 10/26/2016  [employer] Interactive Patient Education © 2017 [employer]

## 2019-06-11 NOTE — PROGRESS NOTES
cc: A. fib, hypertension, sleep apnea, BMI 30, prediabetes, dyslipidemia, vitamin D deficiency, dependent B12 deficiency    Subjective:     Sanjeev Charles is a 53 y.o. male presenting He reports since he started his new medications of Xarelto and metoprolol he has been feeling tired.  He has appointment with Dr. Gr for cardiology on July 19 and appointment with Dr. Win on July 10 for sleep medicine.  He has met with [facility] pharmacist for anticoagulation.  He is taking Xarelto 20 mg daily with meals in the evening.  He is taking metoprolol 25 mg twice a day also for his A. fib and he is taking amlodipine 10 mg once a day for his blood pressure.  He still has to get a blood pressure cuff as he has not had this done yet so he has not been checking but his blood pressure today was doing okay, still a little bit above goal but was 132/76.  Heart rate was 72 today.  He is not wearing any compression stockings and he is on his feet often and does have some swelling in his feet.  He fix his CPAP machine himself and this is been helping some using his CPAP machine.  He has decreased his alcohol intake to 2 beers a week from 2 beers a day.  He is try to quit completely.  He has reduced his chewing from a can once a week to now left hand twice a week.  He is down to 1 to 2 cigarettes a day.  He forgot to schedule his chest x-ray, right upper quadrant liver ultrasound, and heart imaging.  Complete blood count within normal limits, complete metabolic panel within normal limits, A1c 5.8, TSH thyroid within normal limits, lipids with LDL high at 118, vitamin D low at 20, vitamin B12 at 236, hepatitis C-reactive, hepatitis C viral load not detected, HIV negative, chlamydia and gonorrhea negative, syphilis negative, kidney GFR within normal limits.  His hepatitis C treatment was 20 years ago where he did take interferon for about 6 months or possibly longer and reported that he was undetected viral load at  that time.  He reports some anxiety as well with his work and has been on anxiety medications in the past and would like to try something right now and see a counselor.  He also reports getting a corn and having swelling and redness of his right third digit.    Review of systems:     Constitutional: Negative for fever, chills and positive fatigue.   HENT: Negative for sinus pressure, negative for ear pain or hearing loss  Eyes: Negative for blurriness, negative for double vision  Respiratory: Negative for cough and shortness of breath, negative for exertional shortness of breath  Cardiovascular: Negative for leg swelling, negative for palpitations, negative for chest pain  Gastrointestinal: Negative for nausea, vomiting, abdominal pain, constipation and diarrhea.  Genitourinary: Negative for dysuria and hematuria.   Skin: Positive for swelling and redness of the right toe.  Neurological: Negative for dizziness, focal weakness and headaches.   Endo/Heme/Allergies: Denies bleeding, bruising, and recurrent allergies.  Psychiatric/Behavioral: Negative for depression and positive anxiety.  Negative HI or SI or panic attacks.      Current Outpatient Prescriptions:   •  atorvastatin (LIPITOR) 20 MG Tab, Take 1 Tab by mouth every evening., Disp: 90 Tab, Rfl: 0  •  ergocalciferol (DRISDOL) 89313 UNIT capsule, Take one tab po once a week., Disp: 12 Cap, Rfl: 1  •  metoprolol SR (TOPROL XL) 50 MG TABLET SR 24 HR, Take 1 Tab by mouth every day., Disp: 90 Tab, Rfl: 0  •  FLUoxetine (PROZAC) 20 MG Cap, Take 1 Cap by mouth every day., Disp: 90 Cap, Rfl: 0  •  sulfamethoxazole-trimethoprim (BACTRIM DS) 800-160 MG tablet, Take 1 Tab by mouth 2 times a day for 14 days., Disp: 28 Tab, Rfl: 0  •  cephALEXin (KEFLEX) 500 MG Cap, Take 1 Cap by mouth 4 times a day for 14 days., Disp: 56 Cap, Rfl: 0  •  amLODIPine (NORVASC) 10 MG Tab, TAKE 1 TABLET DAILY (HAS UPCOMING APPOINTMENT), Disp: 90 Tab, Rfl: 0  •  rivaroxaban (XARELTO) 20 MG Tab  "tablet, Take 1 Tab by mouth with dinner., Disp: 30 Tab, Rfl: 1  •  Misc. Devices Misc, 1 Kit by Does not apply route every day. (Patient not taking: Reported on 6/11/2019), Disp: 1 Kit, Rfl: 1    Allergies, past medical history, past surgical history, family history, social history reviewed and updated    Objective:     Vitals: /76 (BP Location: Left arm, Patient Position: Sitting, BP Cuff Size: Adult)   Pulse 72   Temp 36.7 °C (98.1 °F) (Temporal)   Resp 12   Ht 1.905 m (6' 3\")   Wt 108.9 kg (240 lb)   SpO2 96%   BMI 30.00 kg/m²   General: Alert, pleasant, NAD  HEENT: Normocephalic.  Nontraumatic. EOMI, no icterus or pallor.  Conjunctivae and lids normal. External ears normal.   Heart: Regular rate and rhythm.  S1 and S2 normal.  No murmurs appreciated.  Respiratory: Normal respiratory effort.  Clear to auscultation bilaterally.  Skin: Warm, dry, no rashes.  Musculoskeletal: Gait is normal.  Moves all extremities well.  Extremities: No leg edema.  Pedal pulses 2+ symmetric.  Right third digit with corn and redness and swelling.  Psych:  Affect/mood is normal, judgement is good, memory is intact, grooming is appropriate.    Assessment/Plan:     Diagnoses and all orders for this visit:    Atrial fibrillation, unspecified type (HCC)  -     metoprolol SR (TOPROL XL) 50 MG TABLET SR 24 HR; Take 1 Tab by mouth every day.    Benign hypertension  -     metoprolol SR (TOPROL XL) 50 MG TABLET SR 24 HR; Take 1 Tab by mouth every day.    Smoker    Alcohol use    Medication monitoring encounter    Sleep apnea, unspecified type    BMI 30.0-30.9,adult  -     Patient identified as having weight management issue.  Appropriate orders and counseling given.    Encounter for smoking cessation counseling  -     REFERRAL TO PHARMACOTHERAPY SERVICE    Chronic hepatitis C without hepatic coma (HCC)  -     REFERRAL TO GI FOR COLONOSCOPY    Prediabetes    Hyperglycemia    Dyslipidemia  -     atorvastatin (LIPITOR) 20 MG Tab; " Take 1 Tab by mouth every evening.    Vitamin D deficiency  -     ergocalciferol (DRISDOL) 22044 UNIT capsule; Take one tab po once a week.    Vitamin B12 deficiency  -     cyanocobalamin (VITAMIN B-12) injection 1,000 mcg; 1 mL by Intramuscular route Once.    Screen for colon cancer  -     REFERRAL TO GI FOR COLONOSCOPY    Anxiety  -     FLUoxetine (PROZAC) 20 MG Cap; Take 1 Cap by mouth every day.  -     REFERRAL TO BEHAVIORAL HEALTH    Essential hypertension    Cellulitis of toe of right foot  -     REFERRAL TO PODIATRY  -     sulfamethoxazole-trimethoprim (BACTRIM DS) 800-160 MG tablet; Take 1 Tab by mouth 2 times a day for 14 days.  -     cephALEXin (KEFLEX) 500 MG Cap; Take 1 Cap by mouth 4 times a day for 14 days.    Corn of toe  -     REFERRAL TO PODIATRY    Onychomycosis  -     REFERRAL TO PODIATRY    -Complete blood count within normal limits, complete metabolic panel within normal limits, A1c 5.8, TSH thyroid within normal limits, lipids with LDL high at 118, vitamin D low at 20, vitamin B12 at 236, hepatitis C-reactive, hepatitis C viral load not detected, HIV negative, chlamydia and gonorrhea negative, syphilis negative, kidney GFR within normal limits.   -He will call radiology at [phone] to get his imaging echo for his heart, chest x-ray, and right upper quadrant liver ultrasound which he forgot.  He will follow-up and make sure to see cardiology for his new onset A. fib and sleep study and continue to use his CPAP.  He will try to work on losing 5 to 10 pounds a month but not too drastically and can try apps such as lose it, weight watchers or my fitness tracker to track his diet and caloric intake and look at food labels.  Please read patient instruction section on prediabetes and steps to take to reverse this and prevent diabetes and we will recheck his A1c in 3 months along with his vitamin D and B12.  He will wear compression stockings as well and get a blood pressure cuff and check the top  number and bottom number systolic and diastolic pressure and heart rate and record this for me sometimes in the morning, afternoon, evening in both arms.  We will change his Lopressor 25 mg twice daily to metoprolol extended release 50 mg once daily.  We will start him on Prozac 20 mg for his anxiety and he will also go see a counselor and at least try this at one time and try candice such as head space candice to help with his anxiety.  He will continue to see [facility] for smoking sensation and he is down to 1 cigarette a day and 1 can of chewing tobacco every 2 weeks which is much better and sees his dentist.  He will continue Xarelto 20 mg for his new onset A. fib and see the cardiologist and get his other imaging done.  We will see what his liver ultrasound done to see if he needs to see his GI doctor again for hepatitis C but viral load is undetected and we will send him for colon cancer screening to the GI.  We will start him on new medication atorvastatin 20 mg daily for his cholesterol due to his increased risk and increase 8 at CVD risk to due to his blood pressure and A. fib.  He will start taking also vitamin D 50,000 units once weekly for 3 months and vitamin B12 1000 mcg injection given today and can continue to take vitamin B12 500 mcg daily over-the-counter.  We will consider decreasing his amlodipine to 5 mg after he checks his blood pressure and heart rate and try another medication if he keeps getting swelling in his legs.  For his cellulitis of his toe we will do to antibiotics Bactrim twice a day with food for 2 weeks and cephalexin 500 mg 4 times a day so every 6 hours with some food and can take some probiotics to avoid any diarrhea and also do this for 2 weeks and will send him to see a podiatrist for his corns and foot care due to his prediabetes and possible poor wound healing due to prediabetes, A. fib, and smoking.  He will see me back in about 4 to 6 weeks for his anxiety and blood pressure and  earlier if he cannot get into see the podiatrist or if his wound gets worse and ER precautions given if any increased swelling, redness, streaking, bruising, increase of infection while on antibiotic please go to the ER right away as you might need IV antibiotics.    Return in about 6 weeks (around 7/23/2019), or Anxiety/Meds/Afib/HTN, for High blood pressure.    Patient was seen for 60 minutes face-to-face of which greater than 50% of the visit was spent in counseling and coordination of care as documented above in their assessment and plan.

## 2019-06-25 ENCOUNTER — ANTICOAGULATION MONITORING (OUTPATIENT)
Dept: VASCULAR LAB | Facility: MEDICAL CENTER | Age: 54
End: 2019-06-25

## 2019-06-25 DIAGNOSIS — I48.91 ATRIAL FIBRILLATION, UNSPECIFIED TYPE (HCC): ICD-10-CM

## 2019-06-25 NOTE — PROGRESS NOTES
Pt is taking Xarelto 20 mg . H&H are 16.7/51.0 cret is 0.95 and cert cl is wnl. The Patient denies any bleeding issues and is not having problems affording the medication. The patient was instructed to go to the ER for falls with a head injury,  blood in urine or stool or any bleeding that last longer than 20 min. She will call into clinic for changes in her health or medication or if she is asked to go off of the medication.   Next test is due Dec 2019.

## 2019-07-10 ENCOUNTER — APPOINTMENT (OUTPATIENT)
Dept: SLEEP MEDICINE | Facility: MEDICAL CENTER | Age: 54
End: 2019-07-10
Payer: COMMERCIAL

## 2019-07-19 PROBLEM — I10 BENIGN HYPERTENSION: Status: RESOLVED | Noted: 2017-03-22 | Resolved: 2019-07-19

## 2019-07-19 PROBLEM — L84 CORN OF TOE: Status: RESOLVED | Noted: 2019-06-11 | Resolved: 2019-07-19

## 2019-07-19 PROBLEM — L03.031 CELLULITIS OF TOE OF RIGHT FOOT: Status: RESOLVED | Noted: 2019-06-11 | Resolved: 2019-07-19

## 2019-07-19 PROBLEM — Z12.11 SCREEN FOR COLON CANCER: Status: RESOLVED | Noted: 2019-06-11 | Resolved: 2019-07-19

## 2019-07-19 PROBLEM — R73.9 HYPERGLYCEMIA: Status: RESOLVED | Noted: 2019-05-06 | Resolved: 2019-07-19

## 2019-07-19 PROBLEM — Z78.9 ALCOHOL USE: Status: RESOLVED | Noted: 2019-05-06 | Resolved: 2019-07-19

## 2019-07-19 PROBLEM — R73.03 PREDIABETES: Status: RESOLVED | Noted: 2019-06-11 | Resolved: 2019-07-19

## 2019-07-19 PROBLEM — F17.200 SMOKER: Status: RESOLVED | Noted: 2019-05-06 | Resolved: 2019-07-19

## 2019-07-19 PROBLEM — F10.90 ALCOHOL USE: Status: RESOLVED | Noted: 2019-05-06 | Resolved: 2019-07-19

## 2019-07-19 PROBLEM — E55.9 VITAMIN D DEFICIENCY: Status: RESOLVED | Noted: 2019-06-11 | Resolved: 2019-07-19

## 2019-07-19 PROBLEM — Z51.81 MEDICATION MONITORING ENCOUNTER: Status: RESOLVED | Noted: 2019-05-06 | Resolved: 2019-07-19

## 2019-11-26 DIAGNOSIS — I10 BENIGN HYPERTENSION: ICD-10-CM

## 2019-11-27 RX ORDER — AMLODIPINE BESYLATE 10 MG/1
TABLET ORAL
Qty: 90 TAB | Refills: 1 | Status: SHIPPED | OUTPATIENT
Start: 2019-11-27 | End: 2020-05-27

## 2019-11-27 NOTE — TELEPHONE ENCOUNTER
Refill X 6 months, sent to pharmacy.Pt. Seen in the last 6 months per protocol.   Lab Results   Component Value Date/Time    SODIUM 139 05/06/2019 10:48 AM    POTASSIUM 3.6 05/06/2019 10:48 AM    CHLORIDE 106 05/06/2019 10:48 AM    CO2 24 05/06/2019 10:48 AM    GLUCOSE 96 05/06/2019 10:48 AM    BUN 14 05/06/2019 10:48 AM    CREATININE 0.95 05/06/2019 10:48 AM

## 2019-11-27 NOTE — TELEPHONE ENCOUNTER
Was the patient seen in the last year in this department? Yes    Does patient have an active prescription for medications requested? No     Received Request Via: Pharmacy    Pt met protocol?: Yes     Last OV 06/11/19    BP Readings from Last 1 Encounters:   06/11/19 132/76

## 2019-12-12 ENCOUNTER — TELEPHONE (OUTPATIENT)
Dept: VASCULAR LAB | Facility: MEDICAL CENTER | Age: 54
End: 2019-12-12

## 2019-12-12 DIAGNOSIS — I48.91 ATRIAL FIBRILLATION, UNSPECIFIED TYPE (HCC): ICD-10-CM

## 2020-02-06 ENCOUNTER — TELEPHONE (OUTPATIENT)
Dept: VASCULAR LAB | Facility: MEDICAL CENTER | Age: 55
End: 2020-02-06

## 2020-02-06 DIAGNOSIS — I48.91 ATRIAL FIBRILLATION, UNSPECIFIED TYPE (HCC): ICD-10-CM

## 2020-02-06 NOTE — TELEPHONE ENCOUNTER
LM on VM that the DOAC labs are due. Informed that DOAC's are high risk medication and that being said they require follow up appt in the clinic as well as lab work every 6 months to make sure the pt is safe.  Phone number left on VM. ordered 2/6. EVERETT Rodgers.

## 2020-04-16 DIAGNOSIS — Z79.01 CHRONIC ANTICOAGULATION: ICD-10-CM

## 2020-08-23 DIAGNOSIS — I10 BENIGN HYPERTENSION: ICD-10-CM

## 2020-08-24 RX ORDER — AMLODIPINE BESYLATE 10 MG/1
TABLET ORAL
Qty: 90 TAB | Refills: 3 | OUTPATIENT
Start: 2020-08-24

## 2020-12-21 ENCOUNTER — APPOINTMENT (OUTPATIENT)
Dept: URGENT CARE | Facility: PHYSICIAN GROUP | Age: 55
End: 2020-12-21
Payer: COMMERCIAL

## 2021-03-08 ENCOUNTER — OFFICE VISIT (OUTPATIENT)
Dept: MEDICAL GROUP | Facility: PHYSICIAN GROUP | Age: 56
End: 2021-03-08
Payer: COMMERCIAL

## 2021-03-08 VITALS
TEMPERATURE: 97.3 F | OXYGEN SATURATION: 98 % | WEIGHT: 258 LBS | HEART RATE: 96 BPM | RESPIRATION RATE: 16 BRPM | HEIGHT: 75 IN | BODY MASS INDEX: 32.08 KG/M2 | SYSTOLIC BLOOD PRESSURE: 146 MMHG | DIASTOLIC BLOOD PRESSURE: 96 MMHG

## 2021-03-08 DIAGNOSIS — B18.2 CHRONIC HEPATITIS C WITHOUT HEPATIC COMA (HCC): ICD-10-CM

## 2021-03-08 DIAGNOSIS — I48.91 ATRIAL FIBRILLATION, UNSPECIFIED TYPE (HCC): Chronic | ICD-10-CM

## 2021-03-08 DIAGNOSIS — E78.5 DYSLIPIDEMIA: Chronic | ICD-10-CM

## 2021-03-08 DIAGNOSIS — Z13.29 SCREENING FOR ENDOCRINE DISORDER: ICD-10-CM

## 2021-03-08 DIAGNOSIS — I10 BENIGN HYPERTENSION: ICD-10-CM

## 2021-03-08 DIAGNOSIS — Z72.0 TOBACCO USER: ICD-10-CM

## 2021-03-08 DIAGNOSIS — I10 ESSENTIAL HYPERTENSION: Chronic | ICD-10-CM

## 2021-03-08 DIAGNOSIS — Z13.0 SCREENING FOR DEFICIENCY ANEMIA: ICD-10-CM

## 2021-03-08 DIAGNOSIS — F41.9 ANXIETY: Chronic | ICD-10-CM

## 2021-03-08 PROBLEM — Z71.6 ENCOUNTER FOR SMOKING CESSATION COUNSELING: Status: RESOLVED | Noted: 2019-06-11 | Resolved: 2021-03-08

## 2021-03-08 PROCEDURE — 99204 OFFICE O/P NEW MOD 45 MIN: CPT | Performed by: INTERNAL MEDICINE

## 2021-03-08 RX ORDER — AMLODIPINE BESYLATE 10 MG/1
TABLET ORAL
Qty: 90 TABLET | Refills: 3 | Status: SHIPPED | OUTPATIENT
Start: 2021-03-08 | End: 2021-07-13 | Stop reason: SDUPTHER

## 2021-03-08 RX ORDER — METOPROLOL SUCCINATE 50 MG/1
50 TABLET, EXTENDED RELEASE ORAL DAILY
Qty: 90 TABLET | Refills: 0 | Status: SHIPPED | OUTPATIENT
Start: 2021-03-08 | End: 2021-07-13 | Stop reason: SDUPTHER

## 2021-03-08 RX ORDER — FLUOXETINE HYDROCHLORIDE 20 MG/1
20 CAPSULE ORAL DAILY
Qty: 90 CAPSULE | Refills: 0 | Status: SHIPPED | OUTPATIENT
Start: 2021-03-08 | End: 2021-06-14

## 2021-03-08 ASSESSMENT — FIBROSIS 4 INDEX: FIB4 SCORE: 1.34

## 2021-03-08 ASSESSMENT — ANXIETY QUESTIONNAIRES
2. NOT BEING ABLE TO STOP OR CONTROL WORRYING: SEVERAL DAYS
6. BECOMING EASILY ANNOYED OR IRRITABLE: SEVERAL DAYS
3. WORRYING TOO MUCH ABOUT DIFFERENT THINGS: SEVERAL DAYS
1. FEELING NERVOUS, ANXIOUS, OR ON EDGE: SEVERAL DAYS
5. BEING SO RESTLESS THAT IT IS HARD TO SIT STILL: MORE THAN HALF THE DAYS
7. FEELING AFRAID AS IF SOMETHING AWFUL MIGHT HAPPEN: MORE THAN HALF THE DAYS
GAD7 TOTAL SCORE: 10
4. TROUBLE RELAXING: MORE THAN HALF THE DAYS

## 2021-03-08 ASSESSMENT — PATIENT HEALTH QUESTIONNAIRE - PHQ9
5. POOR APPETITE OR OVEREATING: 1 - SEVERAL DAYS
SUM OF ALL RESPONSES TO PHQ QUESTIONS 1-9: 13
CLINICAL INTERPRETATION OF PHQ2 SCORE: 5

## 2021-03-08 NOTE — ASSESSMENT & PLAN NOTE
Chronic condition  Patient reported that he ran out of his blood pressure meds metoprolol and amlodipine for approximately 2 months.  Blood pressure is high today.  I refilled the prescription and strongly advised the patient to start his medication right away.  Patient denies chest pain shortness of breath palpitation lightheadedness headache change in vision motor weakness or paresthesia.    Advised the patient to schedule an appointment to see my medical assistant for blood pressure recheck approximately 2 to 3 weeks from today.  Patient voiced understanding.

## 2021-03-08 NOTE — ASSESSMENT & PLAN NOTE
This condition was noted with chart review.  Lab tests ordered including liver panel, AFP, hepatitis C viral load and liver ultrasound.

## 2021-03-08 NOTE — PROGRESS NOTES
CC: Meet new provider  Needs refill for blood pressure medications      HPI: This is a 55 y.o. pt.  Pt's medical history is notable for:     Atrial fibrillation (HCC)  This is a chronic condition for the patient presently taking Xarelto.  No history of bleeding.    Anxiety  Chronic condition.  Patient currently taking Prozac.  Patient reported that he has been under significant amount of stress recently.  He is currently following for unemployment, refinancing his house etc.  Patient requests referral to behavioral health for counseling.  Referral submitted today.      Dyslipidemia  Chronic condition for the patient is due for lab test.    Essential hypertension  Chronic condition  Patient reported that he ran out of his blood pressure meds metoprolol and amlodipine for approximately 2 months.  Blood pressure is high today.  I refilled the prescription and strongly advised the patient to start his medication right away.  Patient denies chest pain shortness of breath palpitation lightheadedness headache change in vision motor weakness or paresthesia.    Advised the patient to schedule an appointment to see my medical assistant for blood pressure recheck approximately 2 to 3 weeks from today.  Patient voiced understanding.    Tobacco user  Strongly advised the patient is important to quit smoking     Chronic hepatitis C without hepatic coma (HCC)  This condition was noted with chart review.  Lab tests ordered including liver panel, AFP, hepatitis C viral load and liver ultrasound.          REVIEW OF SYSTEMS:     Constitutional:  no fever / chills   Eyes: no changes in vision  ENT: no sore throat, no hearing loss  CV:  no chest pain, no palpitations  Pulmonary: no SOB, no cough          Allergies: Patient has no known allergies.    Current Outpatient Medications Ordered in Epic   Medication Sig Dispense Refill   • amLODIPine (NORVASC) 10 MG Tab TAKE 1 TABLET DAILY 90 tablet 3   • rivaroxaban (XARELTO) 20 MG Tab tablet  Take 1 tablet by mouth with dinner. 30 tablet 1   • FLUoxetine (PROZAC) 20 MG Cap Take 1 capsule by mouth every day. 90 capsule 0   • metoprolol SR (TOPROL XL) 50 MG TABLET SR 24 HR Take 1 tablet by mouth every day. 90 tablet 0     No current Epic-ordered facility-administered medications on file.       Past Medical, Social, and Family history reviewed and updated in EPIC     ------------------------------------------------------------------------------     PHYSICAL EXAM:   Vitals:    03/08/21 0830   BP: 146/96   Pulse: 96   Resp: 16   Temp: 36.3 °C (97.3 °F)   SpO2: 98%      Body mass index is 32.25 kg/m².         Constitutional: no acute distress  Neck: supple, no JVD  CV: heart irregularly irregular   resp: normal effort, no wheezing or rales.  GI: abdomen soft, no obvious mass, no tenderness  Neuro: CN 2-12 grossly intact        -----------------------------------------------------------------------------    ASSESSMENT:   1. Tobacco user     2. Atrial fibrillation, unspecified type (HCC)  CBC WITH DIFFERENTIAL    rivaroxaban (XARELTO) 20 MG Tab tablet    metoprolol SR (TOPROL XL) 50 MG TABLET SR 24 HR   3. Anxiety  REFERRAL TO BEHAVIORAL HEALTH    FLUoxetine (PROZAC) 20 MG Cap   4. Dyslipidemia  ALANINE AMINO-TRANS    Lipid Profile   5. Essential hypertension  Basic Metabolic Panel   6. Screening for endocrine disorder  HEMOGLOBIN A1C    TSH    MICROALBUMIN CREAT RATIO URINE    VITAMIN D,25 HYDROXY    VITAMIN B12   7. Screening for deficiency anemia  PROSTATE SPECIFIC AG SCREENING   8. Chronic hepatitis C without hepatic coma (HCC)  HEPATIC FUNCTION PANEL    HCV RNA QUANT, PCR    AFP SERUM TUMOR MARKER    US-RUQ   9. Benign hypertension  amLODIPine (NORVASC) 10 MG Tab    metoprolol SR (TOPROL XL) 50 MG TABLET SR 24 HR    Continue Norvasc.   10. Benign hypertension  amLODIPine (NORVASC) 10 MG Tab    metoprolol SR (TOPROL XL) 50 MG TABLET SR 24 HR           MEDICAL DECISION MAKING: DISCUSSION / STATUS /  PLAN:    Hypertension, uncontrolled.  As above the patient ran out of his blood pressure med for 2 months.  Refill authorized.  Advised the patient to resume metoprolol and amlodipine today.  Sodium restriction is advised.  Advised the patient to schedule an appointment to see my medical assistant approximately 2 to 3 weeks for blood pressure recheck    Hepatitis C.  Lab tests and liver ultrasound requested as above.    Smoker advised the patient is important to quit smoking completely.  The patient is not ready.    Atrial fibrillation.  Chronic stable condition.  Patient denied chest pain shortness of breath palpitation or any new symptoms.  Continue with Xarelto.      PATIENT EDUCATION:  -If any problems should arise, patient was advised to contact our office or go to ER to be evaluated.      Please note that this dictation was created using voice recognition software. I have made every reasonable attempt to correct obvious errors, but it is possible there are errors of grammar and possibly content that I did not discover before finalizing the note.

## 2021-03-08 NOTE — ASSESSMENT & PLAN NOTE
Chronic condition.  Patient currently taking Prozac.  No significant side effects reported.  No new issues noted by the patient.

## 2021-03-10 ENCOUNTER — TELEPHONE (OUTPATIENT)
Dept: MEDICAL GROUP | Facility: PHYSICIAN GROUP | Age: 56
End: 2021-03-10

## 2021-03-10 NOTE — TELEPHONE ENCOUNTER
VOICEMAIL  1. Caller Name: Sanjeev                        Call Back Number: [phone] (home)       2. Message: Pt called and lvm stating that his Rx is about $500 a month coming from the [facility].  Pt states that the Rx would be cheaper being sent [facility].    Pt did not specify what Rx'(s) that he was talking about specifically.     I lvm for pt to call me back for more information.    3. Patient approves office to leave a detailed voicemail/[employer] message: N\A

## 2021-03-11 DIAGNOSIS — I48.91 ATRIAL FIBRILLATION, UNSPECIFIED TYPE (HCC): Chronic | ICD-10-CM

## 2021-03-11 NOTE — TELEPHONE ENCOUNTER
Received request via: Pharmacy    Was the patient seen in the last year in this department? Yes    Does the patient have an active prescription (recently filled or refills available) for medication(s) requested? Yes. Pharmacy change-mail in pharmacy requests 90 day supply

## 2021-03-15 ENCOUNTER — TELEPHONE (OUTPATIENT)
Dept: MEDICAL GROUP | Facility: PHYSICIAN GROUP | Age: 56
End: 2021-03-15

## 2021-03-15 DIAGNOSIS — Z23 NEED FOR VACCINATION: ICD-10-CM

## 2021-03-15 NOTE — TELEPHONE ENCOUNTER
1. Caller Name: Sanjeev                          Call Back Number: [phone] (home)         How would the patient prefer to be contacted with a response: Phone call do NOT leave a detailed message    Called pt per Feliciano Vasquez M.D. to ask the pt if he has tried the following meds:    Warfarin  Pradaxa  Eliquis  Savaysa  Fondaparinux  Enoxaparin  Fragmin    Pt answered no to all of those medications.    Left on providers desk for signature.

## 2021-03-16 NOTE — TELEPHONE ENCOUNTER
FINAL PRIOR AUTHORIZATION STATUS:    1.  Name of Medication & Dose: Xarelto     2. Prior Auth Status: Approved through 03/15/2022     3. Action Taken: Pharmacy Notified: yes Patient Notified: yes

## 2021-06-03 ENCOUNTER — TELEPHONE (OUTPATIENT)
Dept: MEDICAL GROUP | Facility: PHYSICIAN GROUP | Age: 56
End: 2021-06-03
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
## 2021-06-12 DIAGNOSIS — F41.9 ANXIETY: Chronic | ICD-10-CM

## 2021-06-14 RX ORDER — FLUOXETINE HYDROCHLORIDE 20 MG/1
CAPSULE ORAL
Qty: 90 CAPSULE | Refills: 1 | Status: SHIPPED | OUTPATIENT
Start: 2021-06-14 | End: 2021-07-16 | Stop reason: SDUPTHER

## 2021-06-17 ENCOUNTER — HOSPITAL ENCOUNTER (OUTPATIENT)
Dept: LAB | Facility: MEDICAL CENTER | Age: 56
End: 2021-06-17
Attending: INTERNAL MEDICINE
Payer: COMMERCIAL

## 2021-06-17 DIAGNOSIS — I10 ESSENTIAL HYPERTENSION: Chronic | ICD-10-CM

## 2021-06-17 DIAGNOSIS — Z13.0 SCREENING FOR DEFICIENCY ANEMIA: ICD-10-CM

## 2021-06-17 DIAGNOSIS — E78.5 DYSLIPIDEMIA: Chronic | ICD-10-CM

## 2021-06-17 DIAGNOSIS — I48.91 ATRIAL FIBRILLATION, UNSPECIFIED TYPE (HCC): Chronic | ICD-10-CM

## 2021-06-17 DIAGNOSIS — Z13.29 SCREENING FOR ENDOCRINE DISORDER: ICD-10-CM

## 2021-06-17 DIAGNOSIS — B18.2 CHRONIC HEPATITIS C WITHOUT HEPATIC COMA (HCC): ICD-10-CM

## 2021-06-17 LAB
BASOPHILS # BLD AUTO: 0.5 % (ref 0–1.8)
BASOPHILS # BLD: 0.05 K/UL (ref 0–0.12)
CREAT UR-MCNC: 576.27 MG/DL
EOSINOPHIL # BLD AUTO: 0.04 K/UL (ref 0–0.51)
EOSINOPHIL NFR BLD: 0.4 % (ref 0–6.9)
ERYTHROCYTE [DISTWIDTH] IN BLOOD BY AUTOMATED COUNT: 43.5 FL (ref 35.9–50)
EST. AVERAGE GLUCOSE BLD GHB EST-MCNC: 111 MG/DL
HBA1C MFR BLD: 5.5 % (ref 4–5.6)
HCT VFR BLD AUTO: 45.1 % (ref 42–52)
HGB BLD-MCNC: 16.4 G/DL (ref 14–18)
IMM GRANULOCYTES # BLD AUTO: 0.04 K/UL (ref 0–0.11)
IMM GRANULOCYTES NFR BLD AUTO: 0.4 % (ref 0–0.9)
LYMPHOCYTES # BLD AUTO: 1.74 K/UL (ref 1–4.8)
LYMPHOCYTES NFR BLD: 18.3 % (ref 22–41)
MCH RBC QN AUTO: 33.7 PG (ref 27–33)
MCHC RBC AUTO-ENTMCNC: 36.4 G/DL (ref 33.7–35.3)
MCV RBC AUTO: 92.8 FL (ref 81.4–97.8)
MICROALBUMIN UR-MCNC: 31.5 MG/DL
MICROALBUMIN/CREAT UR: 55 MG/G (ref 0–30)
MONOCYTES # BLD AUTO: 0.5 K/UL (ref 0–0.85)
MONOCYTES NFR BLD AUTO: 5.3 % (ref 0–13.4)
NEUTROPHILS # BLD AUTO: 7.15 K/UL (ref 1.82–7.42)
NEUTROPHILS NFR BLD: 75.1 % (ref 44–72)
NRBC # BLD AUTO: 0 K/UL
NRBC BLD-RTO: 0 /100 WBC
PLATELET # BLD AUTO: 261 K/UL (ref 164–446)
PMV BLD AUTO: 10.2 FL (ref 9–12.9)
RBC # BLD AUTO: 4.86 M/UL (ref 4.7–6.1)
WBC # BLD AUTO: 9.5 K/UL (ref 4.8–10.8)

## 2021-06-17 PROCEDURE — 82043 UR ALBUMIN QUANTITATIVE: CPT

## 2021-06-17 PROCEDURE — 80061 LIPID PANEL: CPT

## 2021-06-17 PROCEDURE — 85025 COMPLETE CBC W/AUTO DIFF WBC: CPT

## 2021-06-17 PROCEDURE — 84443 ASSAY THYROID STIM HORMONE: CPT

## 2021-06-17 PROCEDURE — 87522 HEPATITIS C REVRS TRNSCRPJ: CPT

## 2021-06-17 PROCEDURE — 82607 VITAMIN B-12: CPT

## 2021-06-17 PROCEDURE — 84153 ASSAY OF PSA TOTAL: CPT

## 2021-06-17 PROCEDURE — 82105 ALPHA-FETOPROTEIN SERUM: CPT

## 2021-06-17 PROCEDURE — 36415 COLL VENOUS BLD VENIPUNCTURE: CPT

## 2021-06-17 PROCEDURE — 82570 ASSAY OF URINE CREATININE: CPT

## 2021-06-17 PROCEDURE — 80048 BASIC METABOLIC PNL TOTAL CA: CPT

## 2021-06-17 PROCEDURE — 80076 HEPATIC FUNCTION PANEL: CPT

## 2021-06-17 PROCEDURE — 82306 VITAMIN D 25 HYDROXY: CPT

## 2021-06-17 PROCEDURE — 83036 HEMOGLOBIN GLYCOSYLATED A1C: CPT

## 2021-06-18 LAB
25(OH)D3 SERPL-MCNC: 36 NG/ML (ref 30–100)
ALBUMIN SERPL BCP-MCNC: 4 G/DL (ref 3.2–4.9)
ALP SERPL-CCNC: 81 U/L (ref 30–99)
ALT SERPL-CCNC: 35 U/L (ref 2–50)
ANION GAP SERPL CALC-SCNC: 14 MMOL/L (ref 7–16)
AST SERPL-CCNC: 34 U/L (ref 12–45)
BILIRUB CONJ SERPL-MCNC: 0.2 MG/DL (ref 0.1–0.5)
BILIRUB INDIRECT SERPL-MCNC: 0.8 MG/DL (ref 0–1)
BILIRUB SERPL-MCNC: 1 MG/DL (ref 0.1–1.5)
BUN SERPL-MCNC: 10 MG/DL (ref 8–22)
CALCIUM SERPL-MCNC: 8.8 MG/DL (ref 8.5–10.5)
CHLORIDE SERPL-SCNC: 101 MMOL/L (ref 96–112)
CHOLEST SERPL-MCNC: 211 MG/DL (ref 100–199)
CO2 SERPL-SCNC: 23 MMOL/L (ref 20–33)
CREAT SERPL-MCNC: 1.12 MG/DL (ref 0.5–1.4)
FASTING STATUS PATIENT QL REPORTED: NORMAL
GLUCOSE SERPL-MCNC: 170 MG/DL (ref 65–99)
HDLC SERPL-MCNC: 69 MG/DL
LDLC SERPL CALC-MCNC: 121 MG/DL
POTASSIUM SERPL-SCNC: 2.9 MMOL/L (ref 3.6–5.5)
PROT SERPL-MCNC: 6.5 G/DL (ref 6–8.2)
PSA SERPL-MCNC: 5.1 NG/ML (ref 0–4)
SODIUM SERPL-SCNC: 138 MMOL/L (ref 135–145)
TRIGL SERPL-MCNC: 106 MG/DL (ref 0–149)
TSH SERPL DL<=0.005 MIU/L-ACNC: 2.34 UIU/ML (ref 0.38–5.33)
VIT B12 SERPL-MCNC: 553 PG/ML (ref 211–911)

## 2021-06-19 ENCOUNTER — TELEPHONE (OUTPATIENT)
Dept: MEDICAL GROUP | Facility: PHYSICIAN GROUP | Age: 56
End: 2021-06-19

## 2021-06-19 DIAGNOSIS — R97.20 PSA ELEVATION: ICD-10-CM

## 2021-06-19 DIAGNOSIS — E87.6 HYPOKALEMIA: ICD-10-CM

## 2021-06-19 LAB — AFP-TM SERPL-MCNC: 5 NG/ML (ref 0–9)

## 2021-06-19 NOTE — TELEPHONE ENCOUNTER
Telephone note:    Blood test came back showed low potassium level 2.9.  Have tried to call pt several times without success.  Left voice mail message and recommended  for pt to go to ER to be evaluated.    In addition, will also send a message to pt via [employer].

## 2021-06-20 ENCOUNTER — TELEPHONE (OUTPATIENT)
Dept: MEDICAL GROUP | Facility: PHYSICIAN GROUP | Age: 56
End: 2021-06-20

## 2021-06-20 LAB
HCV RNA SERPL NAA+PROBE-ACNC: NOT DETECTED IU/ML
HCV RNA SERPL NAA+PROBE-LOG IU: NOT DETECTED LOG IU/ML
HCV RNA SERPL QL NAA+PROBE: NOT DETECTED

## 2021-06-20 NOTE — TELEPHONE ENCOUNTER
Telephone note:      Recent blood tests showed several abnormalities:     - low potassium level 2.9 ,   -elevated Psa 5.1  -mildly elevated cholesterol 211 and urine microalbumin.    I have tried to contact the pt by phone  SEVERAL times yesterday and  again this morning at 8: 58 am:  however,  once again pt not available.    I left messages on his answering machine / recommended for pt to go to [facility] ER to be evaluated for the low potassium level.    I have also notified the results to pt via [employer]. Also recommended pt to go to ER to be evaluated for the low potassium level.    At this time, I have sent out a letter to the patient.

## 2021-06-20 NOTE — TELEPHONE ENCOUNTER
Telephone note:    Addendum note:    As noted in my previous note: Recent blood test showed low potassium level 2.9  I have tried to contact the pt by phone  again at 7pm: once again pt not available    Left another voice mail message/  recommended  for pt to go to ER to be evaluated      Tried also to contact pts spouse Maki Charles.  However, [phone]  which is also the same number as above.

## 2021-06-21 ENCOUNTER — HOSPITAL ENCOUNTER (EMERGENCY)
Facility: MEDICAL CENTER | Age: 56
End: 2021-06-21
Attending: EMERGENCY MEDICINE
Payer: COMMERCIAL

## 2021-06-21 ENCOUNTER — APPOINTMENT (OUTPATIENT)
Dept: RADIOLOGY | Facility: MEDICAL CENTER | Age: 56
End: 2021-06-21
Attending: EMERGENCY MEDICINE
Payer: COMMERCIAL

## 2021-06-21 ENCOUNTER — TELEPHONE (OUTPATIENT)
Dept: MEDICAL GROUP | Facility: PHYSICIAN GROUP | Age: 56
End: 2021-06-21

## 2021-06-21 VITALS
TEMPERATURE: 98 F | WEIGHT: 234.35 LBS | RESPIRATION RATE: 19 BRPM | HEIGHT: 74 IN | SYSTOLIC BLOOD PRESSURE: 127 MMHG | OXYGEN SATURATION: 94 % | DIASTOLIC BLOOD PRESSURE: 98 MMHG | HEART RATE: 91 BPM | BODY MASS INDEX: 30.08 KG/M2

## 2021-06-21 DIAGNOSIS — R53.1 WEAKNESS: ICD-10-CM

## 2021-06-21 DIAGNOSIS — R80.9 PROTEINURIA, UNSPECIFIED TYPE: ICD-10-CM

## 2021-06-21 DIAGNOSIS — E86.0 DEHYDRATION: ICD-10-CM

## 2021-06-21 DIAGNOSIS — E87.6 HYPOKALEMIA: ICD-10-CM

## 2021-06-21 LAB
ALBUMIN SERPL BCP-MCNC: 4 G/DL (ref 3.2–4.9)
ALBUMIN/GLOB SERPL: 1.7 G/DL
ALP SERPL-CCNC: 75 U/L (ref 30–99)
ALT SERPL-CCNC: 33 U/L (ref 2–50)
ANION GAP SERPL CALC-SCNC: 13 MMOL/L (ref 7–16)
AST SERPL-CCNC: 34 U/L (ref 12–45)
BASOPHILS # BLD AUTO: 0.6 % (ref 0–1.8)
BASOPHILS # BLD: 0.04 K/UL (ref 0–0.12)
BILIRUB SERPL-MCNC: 0.6 MG/DL (ref 0.1–1.5)
BUN SERPL-MCNC: 12 MG/DL (ref 8–22)
CALCIUM SERPL-MCNC: 9 MG/DL (ref 8.5–10.5)
CHLORIDE SERPL-SCNC: 107 MMOL/L (ref 96–112)
CO2 SERPL-SCNC: 24 MMOL/L (ref 20–33)
CREAT SERPL-MCNC: 0.91 MG/DL (ref 0.5–1.4)
EKG IMPRESSION: NORMAL
EOSINOPHIL # BLD AUTO: 0.22 K/UL (ref 0–0.51)
EOSINOPHIL NFR BLD: 3.4 % (ref 0–6.9)
ERYTHROCYTE [DISTWIDTH] IN BLOOD BY AUTOMATED COUNT: 45.7 FL (ref 35.9–50)
GLOBULIN SER CALC-MCNC: 2.3 G/DL (ref 1.9–3.5)
GLUCOSE SERPL-MCNC: 140 MG/DL (ref 65–99)
HCT VFR BLD AUTO: 42.4 % (ref 42–52)
HGB BLD-MCNC: 15.3 G/DL (ref 14–18)
IMM GRANULOCYTES # BLD AUTO: 0.03 K/UL (ref 0–0.11)
IMM GRANULOCYTES NFR BLD AUTO: 0.5 % (ref 0–0.9)
LYMPHOCYTES # BLD AUTO: 2.66 K/UL (ref 1–4.8)
LYMPHOCYTES NFR BLD: 41.6 % (ref 22–41)
MCH RBC QN AUTO: 34.1 PG (ref 27–33)
MCHC RBC AUTO-ENTMCNC: 36.1 G/DL (ref 33.7–35.3)
MCV RBC AUTO: 94.4 FL (ref 81.4–97.8)
MONOCYTES # BLD AUTO: 0.47 K/UL (ref 0–0.85)
MONOCYTES NFR BLD AUTO: 7.4 % (ref 0–13.4)
NEUTROPHILS # BLD AUTO: 2.97 K/UL (ref 1.82–7.42)
NEUTROPHILS NFR BLD: 46.5 % (ref 44–72)
NRBC # BLD AUTO: 0 K/UL
NRBC BLD-RTO: 0 /100 WBC
PLATELET # BLD AUTO: 214 K/UL (ref 164–446)
PMV BLD AUTO: 10.2 FL (ref 9–12.9)
POTASSIUM SERPL-SCNC: 3.2 MMOL/L (ref 3.6–5.5)
PROT SERPL-MCNC: 6.3 G/DL (ref 6–8.2)
RBC # BLD AUTO: 4.49 M/UL (ref 4.7–6.1)
SODIUM SERPL-SCNC: 144 MMOL/L (ref 135–145)
WBC # BLD AUTO: 6.4 K/UL (ref 4.8–10.8)

## 2021-06-21 PROCEDURE — 80053 COMPREHEN METABOLIC PANEL: CPT

## 2021-06-21 PROCEDURE — 70450 CT HEAD/BRAIN W/O DYE: CPT

## 2021-06-21 PROCEDURE — 99283 EMERGENCY DEPT VISIT LOW MDM: CPT

## 2021-06-21 PROCEDURE — 93005 ELECTROCARDIOGRAM TRACING: CPT | Performed by: EMERGENCY MEDICINE

## 2021-06-21 PROCEDURE — 700101 HCHG RX REV CODE 250

## 2021-06-21 PROCEDURE — 93005 ELECTROCARDIOGRAM TRACING: CPT

## 2021-06-21 PROCEDURE — 85025 COMPLETE CBC W/AUTO DIFF WBC: CPT

## 2021-06-21 RX ORDER — SODIUM CHLORIDE AND POTASSIUM CHLORIDE 150; 900 MG/100ML; MG/100ML
INJECTION, SOLUTION INTRAVENOUS
Status: COMPLETED
Start: 2021-06-21 | End: 2021-06-21

## 2021-06-21 RX ORDER — SODIUM CHLORIDE AND POTASSIUM CHLORIDE 150; 900 MG/100ML; MG/100ML
INJECTION, SOLUTION INTRAVENOUS CONTINUOUS
Status: DISCONTINUED | OUTPATIENT
Start: 2021-06-21 | End: 2021-06-22 | Stop reason: HOSPADM

## 2021-06-21 RX ORDER — POTASSIUM CHLORIDE 20 MEQ/1
20 TABLET, EXTENDED RELEASE ORAL DAILY
Qty: 15 TABLET | Refills: 0 | Status: SHIPPED | OUTPATIENT
Start: 2021-06-21 | End: 2021-07-01

## 2021-06-21 RX ADMIN — POTASSIUM CHLORIDE AND SODIUM CHLORIDE: 900; 150 INJECTION, SOLUTION INTRAVENOUS at 19:18

## 2021-06-21 RX ADMIN — SODIUM CHLORIDE AND POTASSIUM CHLORIDE: 150; 900 INJECTION, SOLUTION INTRAVENOUS at 19:18

## 2021-06-21 ASSESSMENT — FIBROSIS 4 INDEX: FIB4 SCORE: 1.26

## 2021-06-21 NOTE — Clinical Note
Sanjeev Charles was seen and treated in our emergency department on 6/21/2021.  He may return to work on 06/23/2021.       If you have any questions or concerns, please don't hesitate to call.      Liz Victor D.O.

## 2021-06-21 NOTE — TELEPHONE ENCOUNTER
Spoke to pt on the phone, advised pt to go to the ER for evaluation of low potassium. Pt asked if he can come to the one on [place], advised pt to go to the ER and not to Urgent Care. Pt's wife states that she will take him to the ER.

## 2021-06-21 NOTE — ED TRIAGE NOTES
"Patient vital signs rechecked and documented per [facility]. Patient denies any new needs at this time.  Patient updated on wait times, thanked for patience. Pt informed to alert triage tech or triage RN with any needs and/or changes in condition; patient verbalized understanding. Patient stated \"I feel super tired. I want to take a nap right now.\"   "

## 2021-06-21 NOTE — TELEPHONE ENCOUNTER
pls try to call pt again today.  I have tried to call pt by phone several times both on Sat and Sunday without success. Please let me know once you are able to get in touch w pt. Thank you.    Recent labs test came back showed several abnormalities:    -potassium level low at 2.9 >> pls rec pt to go to [facility] for evaluation /treatment    -----------------------------------------------------  In addition , other NONurgent issues:    -psa [prostate level] high>. Recommendation: A referral has been submitted to [facility]  If you have not been contacted to schedule a visit after 5 business days please call [phone] and ask for Referral Department to check on the status of the referral.    -Cholesterol :      211                   [Desirable range = below 200]  Please start/continue with low fat low cholesterol diet  , continue to exercise regularly and maintain an ideal weight.     -Urine test showed protein.  Recommendation: 24 urine protein test has been ordered for confirmation. Pls notify pt.

## 2021-06-21 NOTE — TELEPHONE ENCOUNTER
Spoke to pt on the phone, pt verbalized that he will go to the ER to have his potassium level evaluated.

## 2021-06-21 NOTE — ED TRIAGE NOTES
"Chief Complaint   Patient presents with   • Sent by MD     low potassium of 2.9 last week. Feeling fatigued and \"sluggish\" for about one month with balance issue       Pt ambulatory to triage for above complaint.  Pt is AO x 4, follows commands, and responds appropriately to questions. Patient's breathing is unlabored and pain is currently 0/10 on the 0-10 pain scale.  Pt placed in lobby. Patient educated on triage process and encouraged to alert staff for any changes.  "

## 2021-06-22 NOTE — ED NOTES
Pt discharged home with spouse. Pt is A/O x 4, ambulatory with a steady gait. IV discontinued and gauze placed, pt in possession of belongings. Pt provided discharge education and information pertaining to medications and follow up appointments. Discussed signs and symptoms to return to the ER, patient verbalized understanding. Pt received copy of discharge instructions and verbalized understanding.

## 2021-06-22 NOTE — DISCHARGE INSTRUCTIONS
Make sure that you stay well-hydrated during the summer months and eat and drink foods high in potassium i.e. Gatorade and Powerade.  Take the potassium supplement I will be prescribing you.  Follow-up with your primary care provider within the week for recheck and return if any problems or worsening.
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
## 2021-06-30 ENCOUNTER — HOSPITAL ENCOUNTER (OUTPATIENT)
Dept: LAB | Facility: MEDICAL CENTER | Age: 56
End: 2021-06-30
Attending: INTERNAL MEDICINE
Payer: COMMERCIAL

## 2021-06-30 ENCOUNTER — OFFICE VISIT (OUTPATIENT)
Dept: MEDICAL GROUP | Facility: PHYSICIAN GROUP | Age: 56
End: 2021-06-30
Payer: COMMERCIAL

## 2021-06-30 VITALS
BODY MASS INDEX: 28.72 KG/M2 | SYSTOLIC BLOOD PRESSURE: 112 MMHG | HEIGHT: 75 IN | HEART RATE: 62 BPM | TEMPERATURE: 96.3 F | RESPIRATION RATE: 16 BRPM | WEIGHT: 231 LBS | OXYGEN SATURATION: 98 % | DIASTOLIC BLOOD PRESSURE: 76 MMHG

## 2021-06-30 DIAGNOSIS — I10 ESSENTIAL HYPERTENSION: Chronic | ICD-10-CM

## 2021-06-30 DIAGNOSIS — K42.9 UMBILICAL HERNIA WITHOUT OBSTRUCTION AND WITHOUT GANGRENE: ICD-10-CM

## 2021-06-30 DIAGNOSIS — I48.91 ATRIAL FIBRILLATION, UNSPECIFIED TYPE (HCC): ICD-10-CM

## 2021-06-30 DIAGNOSIS — R97.20 PSA ELEVATION: Chronic | ICD-10-CM

## 2021-06-30 DIAGNOSIS — Z12.12 SCREENING FOR COLORECTAL CANCER: ICD-10-CM

## 2021-06-30 DIAGNOSIS — G47.30 SLEEP APNEA, UNSPECIFIED TYPE: ICD-10-CM

## 2021-06-30 DIAGNOSIS — Z02.9 ADMINISTRATIVE ENCOUNTER: ICD-10-CM

## 2021-06-30 DIAGNOSIS — E87.6 HYPOKALEMIA: ICD-10-CM

## 2021-06-30 DIAGNOSIS — Z12.11 SCREENING FOR COLORECTAL CANCER: ICD-10-CM

## 2021-06-30 LAB — POTASSIUM SERPL-SCNC: 3.3 MMOL/L (ref 3.6–5.5)

## 2021-06-30 PROCEDURE — 99214 OFFICE O/P EST MOD 30 MIN: CPT | Performed by: INTERNAL MEDICINE

## 2021-06-30 PROCEDURE — 84132 ASSAY OF SERUM POTASSIUM: CPT

## 2021-06-30 PROCEDURE — 36415 COLL VENOUS BLD VENIPUNCTURE: CPT

## 2021-06-30 ASSESSMENT — FIBROSIS 4 INDEX: FIB4 SCORE: 1.58

## 2021-06-30 NOTE — ASSESSMENT & PLAN NOTE
Recent lab test showed PSA 5.1.  I have recommended and referred the patient to urology for further evaluation.

## 2021-06-30 NOTE — ASSESSMENT & PLAN NOTE
This is a chronic condition.  Patient is presently taking Xarelto.  No history of bleeding.  Patient has not seen cardiology for several years.  Patient requests referral to establish with [facility] cardiologist

## 2021-06-30 NOTE — ASSESSMENT & PLAN NOTE
Patient is applying for disability    Today will be my second visit with the patient    Patient complaining of chronic orthopedic issues including bilateral knee pain  According to the patient these ongoing issue noted for several years.  Patient presently being evaluated by orthopedic specialist.  I have informed the patient that I am not an orthopedic specialist and I have not been managing his orthopedic condition    Recommend for the patient to schedule follow-up appointment to see his orthopedic specialist to assist him with the paperwork related to orthopedic issues

## 2021-06-30 NOTE — ASSESSMENT & PLAN NOTE
This is a chronic condition.  Patient presently taking amlodipine.  Blood pressure has been well controlled.  Today it is 1/12/1978.

## 2021-06-30 NOTE — ASSESSMENT & PLAN NOTE
Patient also reported problem with his CPAP face mask/tubing.  Refer the patient to sleep medicine clinic.

## 2021-06-30 NOTE — PROGRESS NOTES
CC:       HPI: This is a 55 y.o. pt.  Pt's medical history is notable for:     Administrative encounter  Patient is applying for disability    Today will be my second visit with the patient    Patient stated that he has had chronic orthopedic issues including bilateral knee pain  According to the patient these ongoing issue noted for several years.  Patient presently being evaluated by orthopedic specialist.  I have informed the patient that I am not an orthopedic specialist and I have not been managing his orthopedic condition    Recommended for the patient to schedule follow-up appointment to see his orthopedic specialist to assist him with the paperwork related to chronic orthopedic issues    Atrial fibrillation (HCC)  This is a chronic condition.  Patient is presently taking Xarelto.  No history of bleeding.  Patient has not seen cardiology for several years.  Patient requests referral to establish with [facility]    Essential hypertension  This is a chronic condition.  Patient presently taking amlodipine.  Blood pressure has been well controlled.  Today it is 1/12/1978.    PSA elevation  Recent lab test showed PSA 5.1.  I have recommended and referred the patient to urology for further evaluation.    Sleep apnea  Patient also reported problem with his CPAP face mask/tubing.  Refer the patient to sleep medicine clinic.          REVIEW OF SYSTEMS:     Constitutional:  no fever / chills   Neurologic: no headaches  Eyes: no changes in vision  ENT: no sore throat, no hearing loss  CV:  no chest pain, no palpitations  Pulmonary: no SOB, no cough          Allergies: Patient has no known allergies.    Current Outpatient Medications Ordered in Epic   Medication Sig Dispense Refill   • potassium chloride SA (KDUR) 20 MEQ Tab CR Take 1 tablet by mouth every day. 15 tablet 0   • FLUoxetine (PROZAC) 20 MG Cap Take 1 capsule by mouth once daily 90 capsule 1   • rivaroxaban (XARELTO) 20 MG Tab tablet Take 1 tablet by  "mouth with dinner. 90 tablet 0   • amLODIPine (NORVASC) 10 MG Tab TAKE 1 TABLET DAILY 90 tablet 3   • metoprolol SR (TOPROL XL) 50 MG TABLET SR 24 HR Take 1 tablet by mouth every day. 90 tablet 0     No current Epic-ordered facility-administered medications on file.       Past Medical, Social, and Family history reviewed and updated in EPIC     ------------------------------------------------------------------------------     PHYSICAL EXAM:   Vitals:    06/30/21 0703   BP: 112/76   Pulse: 62   Resp: 16   Temp: (!) 35.7 °C (96.3 °F)   SpO2: 98%        Vitals:    06/30/21 0703   BP: 112/76   Weight: 105 kg (231 lb)   Height: 1.905 m (6' 3\")         Body mass index is 28.87 kg/m².    Constitutional: no acute distress  CV: heart irregularly irregular  Resp: normal effort, no wheezing or rales.  GI: abdomen soft, there is umbilical hernia.  Reducible.  Nontender.  Neuro: CN 2-12 grossly intact        -----------------------------------------------------------------------------    ASSESSMENT:   1. Screening for colorectal cancer  REFERRAL TO GI FOR COLONOSCOPY   2. Umbilical hernia without obstruction and without gangrene     3. Atrial fibrillation, unspecified type (HCC)  REFERRAL TO CARDIOLOGY   4. Sleep apnea, unspecified type  REFERRAL TO PULMONARY AND SLEEP MEDICINE   5. Hypokalemia  POTASSIUM SERUM (K)   6. Administrative encounter     7. Essential hypertension     8. PSA elevation             MEDICAL DECISION MAKING: DISCUSSION / STATUS / PLAN:  Umbilical hernia, patient asymptomatic.  Offered to refer the patient to general surgery.  Patient declined.    Atrial fibrillation.  Patient asymptomatic.  Heart rate is controlled.  Continue with Xarelto.  Refer the patient to cardiology as requested    Sleep apnea.  Refer the patient to sleep medicine clinic.    Hypokalemia.  Patient was seen in the emergency room with potassium 2.9.  The repeat potassium from the ER 3.2.  Patient asymptomatic.  I recommend to repeat the " blood test to check potassium level today.    PSA elevation.  Patient was referred to urology.    Hypertension.  Stable continue with amlodipine.     Routine follow-up with PCP 6 months    Health maintenance.  Recommend shingles vaccine however our clinic van out of the vaccine today.  Recommend patient to go to local pharmacy to get it.    -If any problems should arise, patient was advised to contact our office or go to ER to be evaluated.    Please note that this dictation was created using voice recognition software. I have made every reasonable attempt to correct obvious errors, but it is possible there are errors of grammar and possibly content that I did not discover before finalizing the note.

## 2021-07-01 ENCOUNTER — TELEPHONE (OUTPATIENT)
Dept: MEDICAL GROUP | Facility: PHYSICIAN GROUP | Age: 56
End: 2021-07-01

## 2021-07-01 DIAGNOSIS — E87.6 HYPOKALEMIA: ICD-10-CM

## 2021-07-01 RX ORDER — POTASSIUM CHLORIDE 20 MEQ/1
20 TABLET, EXTENDED RELEASE ORAL 2 TIMES DAILY
Qty: 14 TABLET | Refills: 0 | Status: SHIPPED | OUTPATIENT
Start: 2021-07-01 | End: 2022-11-22

## 2021-07-01 NOTE — TELEPHONE ENCOUNTER
Spoke with Sanjeev and relayed recent labs results via PCP. I also sent information to pt's mychart for reference. Pt thanked me for the call.

## 2021-07-01 NOTE — TELEPHONE ENCOUNTER
----- Message from Feliciano Vasquez M.D. sent at 7/1/2021  7:17 AM PDT -----  Please notify patient :     Potassium level has improved but still slightly low  Rec pt to start potassium chloride 20meq po bid x 7d. Rx sent to pharm  Pt to repeat potassium blood test for f.u on day 8

## 2021-07-13 DIAGNOSIS — I48.91 ATRIAL FIBRILLATION, UNSPECIFIED TYPE (HCC): Chronic | ICD-10-CM

## 2021-07-13 DIAGNOSIS — I10 BENIGN HYPERTENSION: ICD-10-CM

## 2021-07-13 RX ORDER — AMLODIPINE BESYLATE 10 MG/1
TABLET ORAL
Qty: 90 TABLET | Refills: 3 | Status: SHIPPED | OUTPATIENT
Start: 2021-07-13 | End: 2021-07-16 | Stop reason: SDUPTHER

## 2021-07-13 RX ORDER — METOPROLOL SUCCINATE 50 MG/1
50 TABLET, EXTENDED RELEASE ORAL DAILY
Qty: 90 TABLET | Refills: 0 | Status: SHIPPED | OUTPATIENT
Start: 2021-07-13 | End: 2021-07-16 | Stop reason: SDUPTHER

## 2021-07-21 RX ORDER — POTASSIUM CHLORIDE 20 MEQ/1
TABLET, EXTENDED RELEASE ORAL
Qty: 14 TABLET | Refills: 0 | OUTPATIENT
Start: 2021-07-21

## 2021-07-21 NOTE — TELEPHONE ENCOUNTER
Denied 2 days ago, need labs.     Lab Results   Component Value Date/Time    SODIUM 144 06/21/2021 06:47 PM    POTASSIUM 3.3 (L) 06/30/2021 07:40 AM    CHLORIDE 107 06/21/2021 06:47 PM    CO2 24 06/21/2021 06:47 PM    GLUCOSE 140 (H) 06/21/2021 06:47 PM    BUN 12 06/21/2021 06:47 PM    CREATININE 0.91 06/21/2021 06:47 PM

## 2021-08-27 ENCOUNTER — TELEPHONE (OUTPATIENT)
Dept: CARDIOLOGY | Facility: MEDICAL CENTER | Age: 56
End: 2021-08-27

## 2021-08-27 NOTE — TELEPHONE ENCOUNTER
Spoke to pt in regards to obtaining records for NP appointment with [facility]. Per patient has never been treated by a previous cardiologist. Confirmed all recent notes, labs, and cardiac imaging are in Epic. Confirmed with patient appointment date, time, and location.

## 2021-09-07 DIAGNOSIS — I48.91 ATRIAL FIBRILLATION, UNSPECIFIED TYPE (HCC): Chronic | ICD-10-CM

## 2021-11-08 ENCOUNTER — TELEPHONE (OUTPATIENT)
Dept: MEDICAL GROUP | Facility: PHYSICIAN GROUP | Age: 56
End: 2021-11-08

## 2021-11-08 DIAGNOSIS — F10.10 ALCOHOL ABUSE: ICD-10-CM

## 2021-11-08 NOTE — TELEPHONE ENCOUNTER
Pt left vm, stating he feels like he is over indulging and does not want to anymore.  Pt is requesting a medication to keep him from drinking.  Pt state's his friend is on Suboxone and it works well, maybe he can try that?

## 2021-11-09 ENCOUNTER — TELEPHONE (OUTPATIENT)
Dept: MEDICAL GROUP | Facility: PHYSICIAN GROUP | Age: 56
End: 2021-11-09

## 2021-11-09 DIAGNOSIS — I48.91 ATRIAL FIBRILLATION, UNSPECIFIED TYPE (HCC): Chronic | ICD-10-CM

## 2021-11-09 DIAGNOSIS — F10.10 ALCOHOL ABUSE: ICD-10-CM

## 2021-11-09 NOTE — TELEPHONE ENCOUNTER
LVM for pt to return call. Also called behavior health programs and left a message for them to call back.

## 2021-11-09 NOTE — TELEPHONE ENCOUNTER
----- Message from Feliciano Vasquez M.D. sent at 11/9/2021  1:01 PM PST -----  Regarding: FW: RX for alcohol and message from Dr. Christina Krishnan  Pls refer to notes below  I would like to refer pt to [facility] medicine thru Behavioral health  to help pt with alcohol condition  Pls contact psychiatry office to see if the referral we submitted  has been done correctly.    Then pls notify pt about the referral    Thank you.  ----- Message -----  From: Lior Wade, Med Ass't  Sent: 11/9/2021   7:11 AM PST  To: Feliciano Vasquez M.D.  Subject: FW: RX for alcohol and message from Dr. York#      ----- Message -----  From: Sanjeev Charles  Sent: 11/8/2021   5:17 PM PST  To: Lior Wade Med Ass't  Subject: RX for alcohol and message from Dr. Vasquez       I really need help with my problem with alcohol

## 2021-11-10 DIAGNOSIS — F10.10 ALCOHOL ABUSE: ICD-10-CM

## 2021-11-10 NOTE — TELEPHONE ENCOUNTER
Spoke to pt on the phone and advised pt of referral to behavioral health for addiction medicine. Pt verbalized understanding.

## 2021-12-27 ENCOUNTER — TELEPHONE (OUTPATIENT)
Dept: MEDICAL GROUP | Facility: PHYSICIAN GROUP | Age: 56
End: 2021-12-27

## 2021-12-27 DIAGNOSIS — G47.30 SLEEP APNEA, UNSPECIFIED TYPE: ICD-10-CM

## 2021-12-28 DIAGNOSIS — G47.30 SLEEP APNEA, UNSPECIFIED TYPE: ICD-10-CM

## 2021-12-28 NOTE — TELEPHONE ENCOUNTER
1. Caller Name: Sanjeev Charles                          Call Back Number: [phone] (home)         How would the patient prefer to be contacted with a response:     2. SPECIFIC Action To Be Taken: Referral pending, please sign.    3. Diagnosis/Clinical Reason for Request: sleep apnea    4. Specialty & Provider Name/Lab/Imaging Location: Sleep Medicine/previous sent in  is set to     5. Is appointment scheduled for requested order/referral: no    Patient was informed they will receive a return phone call from the office ONLY if there are any questions before processing their request. Advised to call back if they haven't received a call from the referral department in 5 days.

## 2022-03-15 DIAGNOSIS — I10 BENIGN HYPERTENSION: ICD-10-CM

## 2022-03-16 RX ORDER — AMLODIPINE BESYLATE 10 MG/1
TABLET ORAL
Qty: 90 TABLET | Refills: 0 | Status: SHIPPED | OUTPATIENT
Start: 2022-03-16 | End: 2022-09-27 | Stop reason: SDUPTHER

## 2022-09-27 DIAGNOSIS — F41.9 ANXIETY: Chronic | ICD-10-CM

## 2022-09-27 DIAGNOSIS — I10 BENIGN HYPERTENSION: ICD-10-CM

## 2022-09-27 RX ORDER — AMLODIPINE BESYLATE 10 MG/1
TABLET ORAL
Qty: 30 TABLET | Refills: 0 | Status: SHIPPED | OUTPATIENT
Start: 2022-09-27 | End: 2022-11-22 | Stop reason: SDUPTHER

## 2022-09-27 RX ORDER — FLUOXETINE HYDROCHLORIDE 20 MG/1
20 CAPSULE ORAL DAILY
Qty: 90 CAPSULE | Refills: 0 | Status: SHIPPED | OUTPATIENT
Start: 2022-09-27 | End: 2022-11-22 | Stop reason: SDUPTHER

## 2022-11-22 ENCOUNTER — OFFICE VISIT (OUTPATIENT)
Dept: MEDICAL GROUP | Facility: PHYSICIAN GROUP | Age: 57
End: 2022-11-22
Payer: COMMERCIAL

## 2022-11-22 VITALS
DIASTOLIC BLOOD PRESSURE: 84 MMHG | RESPIRATION RATE: 16 BRPM | BODY MASS INDEX: 30.96 KG/M2 | TEMPERATURE: 99 F | WEIGHT: 249 LBS | HEART RATE: 100 BPM | HEIGHT: 75 IN | SYSTOLIC BLOOD PRESSURE: 138 MMHG

## 2022-11-22 DIAGNOSIS — I48.91 ATRIAL FIBRILLATION, UNSPECIFIED TYPE (HCC): Chronic | ICD-10-CM

## 2022-11-22 DIAGNOSIS — Z00.00 WELL ADULT EXAM: ICD-10-CM

## 2022-11-22 DIAGNOSIS — R97.20 PSA ELEVATION: Chronic | ICD-10-CM

## 2022-11-22 DIAGNOSIS — R94.31 ABNORMAL EKG: ICD-10-CM

## 2022-11-22 DIAGNOSIS — F41.9 ANXIETY: Chronic | ICD-10-CM

## 2022-11-22 DIAGNOSIS — E66.01 SEVERE OBESITY (HCC): ICD-10-CM

## 2022-11-22 DIAGNOSIS — I10 ESSENTIAL HYPERTENSION: Chronic | ICD-10-CM

## 2022-11-22 DIAGNOSIS — E78.5 DYSLIPIDEMIA: Chronic | ICD-10-CM

## 2022-11-22 DIAGNOSIS — Z12.11 COLON CANCER SCREENING: ICD-10-CM

## 2022-11-22 DIAGNOSIS — Z23 NEED FOR VACCINATION: ICD-10-CM

## 2022-11-22 PROBLEM — Z72.0 TOBACCO USER: Chronic | Status: RESOLVED | Noted: 2021-03-08 | Resolved: 2022-11-22

## 2022-11-22 PROCEDURE — 90471 IMMUNIZATION ADMIN: CPT | Performed by: INTERNAL MEDICINE

## 2022-11-22 PROCEDURE — 90677 PCV20 VACCINE IM: CPT | Performed by: INTERNAL MEDICINE

## 2022-11-22 PROCEDURE — 93000 ELECTROCARDIOGRAM COMPLETE: CPT | Performed by: INTERNAL MEDICINE

## 2022-11-22 PROCEDURE — 90472 IMMUNIZATION ADMIN EACH ADD: CPT | Performed by: INTERNAL MEDICINE

## 2022-11-22 PROCEDURE — 90686 IIV4 VACC NO PRSV 0.5 ML IM: CPT | Performed by: INTERNAL MEDICINE

## 2022-11-22 PROCEDURE — 99215 OFFICE O/P EST HI 40 MIN: CPT | Mod: 25 | Performed by: INTERNAL MEDICINE

## 2022-11-22 RX ORDER — METOPROLOL SUCCINATE 50 MG/1
50 TABLET, EXTENDED RELEASE ORAL DAILY
Qty: 90 TABLET | Refills: 1 | Status: SHIPPED | OUTPATIENT
Start: 2022-11-22 | End: 2022-12-23 | Stop reason: SDUPTHER

## 2022-11-22 RX ORDER — AMLODIPINE BESYLATE 10 MG/1
TABLET ORAL
Qty: 30 TABLET | Refills: 0 | Status: SHIPPED | OUTPATIENT
Start: 2022-11-22 | End: 2022-12-23 | Stop reason: SDUPTHER

## 2022-11-22 RX ORDER — FLUOXETINE HYDROCHLORIDE 20 MG/1
20 CAPSULE ORAL DAILY
Qty: 90 CAPSULE | Refills: 0 | Status: SHIPPED | OUTPATIENT
Start: 2022-11-22 | End: 2023-01-09 | Stop reason: SDUPTHER

## 2022-11-22 ASSESSMENT — FIBROSIS 4 INDEX: FIB4 SCORE: 1.63

## 2022-11-22 NOTE — ASSESSMENT & PLAN NOTE
This is a chronic condition.  Patient reported that due to busy schedule and being out of town he has not seen a cardiologist for over 1 year.  Patient is currently taking metoprolol 50 mg daily.  Patient also ran out of Xarelto.  Patient request refills  Patient denies any history of bleeding.

## 2022-11-22 NOTE — PROGRESS NOTES
"PRIMARY CARE CLINIC VISIT    Chief complaint:    Follow-up hypertension.  Prescription refills  Requests lab test  Immunization update      History of Present Illness     Severe obesity (HCC)  Body mass index is 31.12 kg/m².   Brief discussion with the patient regarding diet, exercise, and lifestyle modification to help achieve and maintain healthy weight              Atrial fibrillation (HCC)  This is a chronic condition.  Patient reported that due to busy schedule and being out of town he has not seen a cardiologist for over 1 year.  Patient is currently taking metoprolol 50 mg daily.  Patient also ran out of Xarelto.  Patient request refills  Patient denies any history of bleeding.    PSA elevation  Previous PSA noted to be elevated.  Patient asymptomatic.  Patient was referred to urology previously.  I recommended to repeat the PSA.  Lab tests ordered today.    Dyslipidemia  Chronic condition.  The patient presently on diet therapy.  Blood tests ordered for follow-up.    Essential hypertension  This is a chronic condition.  The patient is now taking amlodipine 10 mg daily and metoprolol 50 mg daily.  No significant side effects reported.  Patient request prescription refills.    Anxiety  This is a chronic and stable condition.  Patient take Prozac 20 mg daily.  Patient denies SI.  Patient request prescription refills.    No current outpatient medications on file prior to visit.     No current facility-administered medications on file prior to visit.        Allergies: Patient has no known allergies.    ROS  As per HPI above. All other systems reviewed and negative.      Past Medical, Social, and Family history reviewed and updated in EPIC     Objective     /84 (BP Location: Right arm, Patient Position: Sitting, BP Cuff Size: Large adult)   Pulse 100   Temp 37.2 °C (99 °F) (Temporal)   Resp 16   Ht 1.905 m (6' 3\")   Wt 113 kg (249 lb)    Body mass index is 31.12 kg/m².    General: alert in no apparent " distress.  Cardiovascular: Tachycardic.  Irregularly irregular   pulmonary: lungs : no wheezing   Gastrointestinal: BS present. No obvious mass noted  Neuro nonfocal cranial nerves II to XII grossly intact      I independently reviewed the patient's ekg: atrial Fibrillation.  Rate 104.  Anterior infarct, old.  Abnormal EKG.  Result dw pt    Assessment and Plan     1. Atrial fibrillation, unspecified type (HCC)/abnormal EKG.  - rivaroxaban (XARELTO) 20 MG Tab tablet; Take 1 Tablet by mouth with dinner.  Dispense: 90 Tablet; Refill: 1    This is a chronic condition.  Patient asymptomatic.  He denies chest pain shortness of breath palpitation or near syncope.  Heart rate should be better controlled.   I discussed with the patient about  adding diltiazem to his regimen.  However patient reported that he ran out of metoprolol for quite some time which may account for the elevated heart rate.  Patient declined to take diltiazem at this time.  Advised the patient to resume metoprolol ASAP.    Advised the patient to continue to take metoprolol and Xarelto.  Referred the patient to cardiology.    - Referral to Anticoagulation Monitoring    2. PSA elevation.  Chronic condition.  Noted with previous lab test.  Patient asymptomatic.  Lab tests ordered for follow-up.      3. Anxiety  This is a chronic and stable condition.  Advised the patient continue to take Prozac.  Discussed with the patient declined mental health referral.  - FLUoxetine (PROZAC) 20 MG Cap; Take 1 Capsule by mouth every day.  Dispense: 90 Capsule; Refill: 0    4. Dyslipidemia  Chronic condition.  Lipid panel requested.  Recommend low-fat low-cholesterol diet.    5. Essential hypertension  This is a chronic and stable condition.  Advised the patient to continue with amlodipine and metoprolol.  Refill sent to the pharmacy.  BP today 1 9324.  - amLODIPine (NORVASC) 10 MG Tab; TAKE 1 TABLET DAILY  Dispense: 30 Tablet; Refill: 0  - metoprolol SR (TOPROL XL) 50 MG  TABLET SR 24 HR; Take 1 Tablet by mouth every day.  Dispense: 90 Tablet; Refill: 1    6. Severe obesity (HCC)  Chronic condition.  Advised diet and exercise.  Encouraged weight loss.      7. Need for vaccination  - Pneumococcal Conjugate Vaccine 20-Valent (19 yrs+)  - INFLUENZA VACCINE QUAD INJ (PF)    8. Well adult exam  - Basic Metabolic Panel; Future  - HEMOGLOBIN A1C; Future  - CBC WITHOUT DIFFERENTIAL; Future  - Lipid Profile; Future  - PROSTATE SPECIFIC AG SCREENING; Future  - MAGNESIUM; Future          Total time:  46   min -  That includes time for chart review before the visit, the actual patient visit, and time spent on documentation in EMR after the visit.  Chart review/prep, review of other providers' records, imaging/lab review, face-to-face time for history/examination, ordering, prescribing,  review of results/meds/ treatment plan with patient, and care coordination.            Healthcare Maintenance     Refer the patient to GI for screening colonoscopy.          Please note that this dictation was created using voice recognition software. I have made every reasonable attempt to correct obvious errors, but I expect that there are errors of grammar and possibly content that I did not discover before finalizing the note.    Felicinao Vasquez MD  Internal Medicine  [facility]

## 2022-11-22 NOTE — ASSESSMENT & PLAN NOTE
Previous PSA noted to be elevated.  Patient asymptomatic.  Patient was referred to urology previously.  I recommended to repeat the PSA.  Lab tests ordered today.

## 2022-11-22 NOTE — ASSESSMENT & PLAN NOTE
Body mass index is 31.12 kg/m².   Brief discussion with the patient regarding diet, exercise, and lifestyle modification to help achieve and maintain healthy weight

## 2022-11-22 NOTE — ASSESSMENT & PLAN NOTE
This is a chronic and stable condition.  Patient take Prozac 20 mg daily.  Patient denies SI.  Patient request prescription refills.

## 2022-11-22 NOTE — ASSESSMENT & PLAN NOTE
This is a chronic condition.  The patient is now taking amlodipine 10 mg daily and metoprolol 50 mg daily.  No significant side effects reported.  Patient request prescription refills.

## 2022-11-23 ENCOUNTER — HOSPITAL ENCOUNTER (OUTPATIENT)
Dept: LAB | Facility: MEDICAL CENTER | Age: 57
End: 2022-11-23
Attending: INTERNAL MEDICINE
Payer: COMMERCIAL

## 2022-11-23 ENCOUNTER — DOCUMENTATION (OUTPATIENT)
Dept: VASCULAR LAB | Facility: MEDICAL CENTER | Age: 57
End: 2022-11-23
Payer: COMMERCIAL

## 2022-11-23 DIAGNOSIS — Z00.00 WELL ADULT EXAM: ICD-10-CM

## 2022-11-23 LAB
ANION GAP SERPL CALC-SCNC: 13 MMOL/L (ref 7–16)
BUN SERPL-MCNC: 14 MG/DL (ref 8–22)
CALCIUM SERPL-MCNC: 9.8 MG/DL (ref 8.5–10.5)
CHLORIDE SERPL-SCNC: 103 MMOL/L (ref 96–112)
CHOLEST SERPL-MCNC: 193 MG/DL (ref 100–199)
CO2 SERPL-SCNC: 23 MMOL/L (ref 20–33)
CREAT SERPL-MCNC: 1.1 MG/DL (ref 0.5–1.4)
ERYTHROCYTE [DISTWIDTH] IN BLOOD BY AUTOMATED COUNT: 44.7 FL (ref 35.9–50)
EST. AVERAGE GLUCOSE BLD GHB EST-MCNC: 117 MG/DL
FASTING STATUS PATIENT QL REPORTED: NORMAL
GFR SERPLBLD CREATININE-BSD FMLA CKD-EPI: 77 ML/MIN/1.73 M 2
GLUCOSE SERPL-MCNC: 93 MG/DL (ref 65–99)
HBA1C MFR BLD: 5.7 % (ref 4–5.6)
HCT VFR BLD AUTO: 47 % (ref 42–52)
HDLC SERPL-MCNC: 58 MG/DL
HGB BLD-MCNC: 16.2 G/DL (ref 14–18)
LDLC SERPL CALC-MCNC: 120 MG/DL
MAGNESIUM SERPL-MCNC: 1.8 MG/DL (ref 1.5–2.5)
MCH RBC QN AUTO: 32 PG (ref 27–33)
MCHC RBC AUTO-ENTMCNC: 34.5 G/DL (ref 33.7–35.3)
MCV RBC AUTO: 92.7 FL (ref 81.4–97.8)
PLATELET # BLD AUTO: 244 K/UL (ref 164–446)
PMV BLD AUTO: 10.8 FL (ref 9–12.9)
POTASSIUM SERPL-SCNC: 3.9 MMOL/L (ref 3.6–5.5)
PSA SERPL-MCNC: 3.37 NG/ML (ref 0–4)
RBC # BLD AUTO: 5.07 M/UL (ref 4.7–6.1)
SODIUM SERPL-SCNC: 139 MMOL/L (ref 135–145)
TRIGL SERPL-MCNC: 74 MG/DL (ref 0–149)
WBC # BLD AUTO: 9.9 K/UL (ref 4.8–10.8)

## 2022-11-23 PROCEDURE — 83036 HEMOGLOBIN GLYCOSYLATED A1C: CPT

## 2022-11-23 PROCEDURE — 84153 ASSAY OF PSA TOTAL: CPT

## 2022-11-23 PROCEDURE — 36415 COLL VENOUS BLD VENIPUNCTURE: CPT

## 2022-11-23 PROCEDURE — 80061 LIPID PANEL: CPT

## 2022-11-23 PROCEDURE — 80048 BASIC METABOLIC PNL TOTAL CA: CPT

## 2022-11-23 PROCEDURE — 83735 ASSAY OF MAGNESIUM: CPT

## 2022-11-23 PROCEDURE — 85027 COMPLETE CBC AUTOMATED: CPT

## 2022-11-23 NOTE — PROGRESS NOTES
[facility] V[facility] and Pharmacotherapy Programs    Received anticoagulation referral for Xarelto due to Afib from Dr. Feliciano Vasquez on 11/23.     Patient scheduled for initial appointment 12/16    Insurance: [facility] [employer]  PCP: Dr Vasquez  Locations to be seen: Any, [place] preferred    [facility]ticoagulation[facility]Pharmacotherapy [facility] at [phone], fax [phone]    Sarahi MooreD

## 2022-11-25 PROBLEM — R73.03 PREDIABETES: Status: ACTIVE | Noted: 2022-11-25

## 2022-12-06 ENCOUNTER — TELEPHONE (OUTPATIENT)
Dept: MEDICAL GROUP | Facility: PHYSICIAN GROUP | Age: 57
End: 2022-12-06
Payer: COMMERCIAL

## 2022-12-06 DIAGNOSIS — I48.91 ATRIAL FIBRILLATION, UNSPECIFIED TYPE (HCC): Chronic | ICD-10-CM

## 2022-12-06 NOTE — TELEPHONE ENCOUNTER
FINAL PRIOR AUTHORIZATION STATUS:    1.  Name of Medication & Dose: Xarelto 20mg     2. Prior Auth Status: Approved through 12/06/2023     3. Action Taken: Pharmacy Notified: yes Patient Notified: yes

## 2022-12-07 ENCOUNTER — TELEPHONE (OUTPATIENT)
Dept: HEALTH INFORMATION MANAGEMENT | Facility: OTHER | Age: 57
End: 2022-12-07
Payer: COMMERCIAL

## 2022-12-13 ENCOUNTER — TELEPHONE (OUTPATIENT)
Dept: MEDICAL GROUP | Facility: PHYSICIAN GROUP | Age: 57
End: 2022-12-13
Payer: COMMERCIAL

## 2022-12-13 NOTE — TELEPHONE ENCOUNTER
MEDICATION PRIOR AUTHORIZATION NEEDED:    1. Name of Medication: Xarelto 20 mg    2. Requested By (Name of Pharmacy): [facility]     3. Is insurance on file current? yes    4. What is the name & phone number of the 3rd party payor? [employer]

## 2022-12-16 ENCOUNTER — ANTICOAGULATION VISIT (OUTPATIENT)
Dept: MEDICAL GROUP | Facility: PHYSICIAN GROUP | Age: 57
End: 2022-12-16
Payer: COMMERCIAL

## 2022-12-16 DIAGNOSIS — I48.91 ATRIAL FIBRILLATION, UNSPECIFIED TYPE (HCC): Chronic | ICD-10-CM

## 2022-12-16 DIAGNOSIS — Z79.01 LONG TERM (CURRENT) USE OF ANTICOAGULANTS: Primary | ICD-10-CM

## 2022-12-16 LAB — INR PPP: 1.3 (ref 2–3.5)

## 2022-12-16 PROCEDURE — 85610 PROTHROMBIN TIME: CPT | Performed by: INTERNAL MEDICINE

## 2022-12-16 PROCEDURE — 99211 OFF/OP EST MAY X REQ PHY/QHP: CPT | Performed by: INTERNAL MEDICINE

## 2022-12-16 NOTE — PROGRESS NOTES
NEW DOAC   .  Anticoagulation Summary  As of 2022      INR goal:     TTR:  --   INR used for dosin.30 (2022)   Warfarin maintenance plan:  No maintenance plan   Next INR check:  2023   Target end date:  Indefinite    Indications    Atrial fibrillation (HCC) [I48.91]  Long term (current) use of anticoagulants [Z79.01]                 Anticoagulation Episode Summary       INR check location:      Preferred lab:      Send INR reminders to:      Comments:            Anticoagulation Care Providers       Provider Role Specialty Phone number    Feliciano Vasquez M.D. Referring Internal Medicine [phone]    [facility] Responsible  [phone]          Anticoagulation Patient Findings      PCP: Feliciano Vasquez M.D.  Cardiologist: Ketty VANG  Dx: Atrial fibrillation  CHADSVASC = at least 1 (possibly 2 with borderline A1c)  HAS-BLED = 1  Target End Date = Indefinite    Pt Hx:   Patient here today to reestablish care with anticoagulation clinic, lost to f/u after Covid outbreak.  Was off anticoagulation for ~2 months recently as he travels extensively for work, but has been back to routine dosing since that time.    Labs:  Lab Results   Component Value Date/Time    WBC 9.9 2022 01:13 PM    RBC 5.07 2022 01:13 PM    HEMOGLOBIN 16.2 2022 01:13 PM    HEMATOCRIT 47.0 2022 01:13 PM    MCV 92.7 2022 01:13 PM    MCH 32.0 2022 01:13 PM    MCHC 34.5 2022 01:13 PM    MPV 10.8 2022 01:13 PM    NEUTSPOLYS 46.50 2021 06:47 PM    LYMPHOCYTES 41.60 (H) 2021 06:47 PM    MONOCYTES 7.40 2021 06:47 PM    EOSINOPHILS 3.40 2021 06:47 PM    BASOPHILS 0.60 2021 06:47 PM      Lab Results   Component Value Date/Time    SODIUM 139 2022 01:13 PM    POTASSIUM 3.9 2022 01:13 PM    CHLORIDE 103 2022 01:13 PM    CO2 23 2022 01:13 PM    GLUCOSE 93 2022 01:13 PM    BUN 14 2022 01:13 PM     CREATININE 1.10 11/23/2022 01:13 PM          Pt is NOT new to Xarelto, nor new to [facility]. Discussed:   Indication for DOAC therapy.  Importance of monitoring and compliance.   Monitoring parameters, signs and symptoms of bleeding or clotting.  DOAC therapy, side effects, potential DDIs, OTC medications  Hormonal therapy - none  Pregnancy - n/a  DDI - SSRI may increase bleeding risk, patient advised.  Pt NOT on antiplatelet/NSAID therapy.   Lifestyle safety, ie smoking, ETOH, hobby safety, fall safety/prevention  Procedures for missed doses or suspected missed doses, surgeries/procedures, travel, dental work, any medication changes    Continue with Xarelto 20mg QD    DOAC affordable = Yes    Samples provided: No    Labs to be completed prior to next f/u - CBC, CMP    F/U - 6 months    Silverio Hays, PharmD, BCACP

## 2022-12-23 ENCOUNTER — OFFICE VISIT (OUTPATIENT)
Dept: CARDIOLOGY | Facility: MEDICAL CENTER | Age: 57
End: 2022-12-23
Attending: INTERNAL MEDICINE
Payer: COMMERCIAL

## 2022-12-23 VITALS
OXYGEN SATURATION: 95 % | RESPIRATION RATE: 18 BRPM | BODY MASS INDEX: 31.46 KG/M2 | DIASTOLIC BLOOD PRESSURE: 68 MMHG | WEIGHT: 253 LBS | HEIGHT: 75 IN | SYSTOLIC BLOOD PRESSURE: 118 MMHG | HEART RATE: 83 BPM

## 2022-12-23 DIAGNOSIS — E66.09 CLASS 1 OBESITY DUE TO EXCESS CALORIES WITH SERIOUS COMORBIDITY AND BODY MASS INDEX (BMI) OF 31.0 TO 31.9 IN ADULT: ICD-10-CM

## 2022-12-23 DIAGNOSIS — E78.5 DYSLIPIDEMIA: Chronic | ICD-10-CM

## 2022-12-23 DIAGNOSIS — Z79.01 LONG TERM (CURRENT) USE OF ANTICOAGULANTS: ICD-10-CM

## 2022-12-23 DIAGNOSIS — I48.91 ATRIAL FIBRILLATION, UNSPECIFIED TYPE (HCC): Chronic | ICD-10-CM

## 2022-12-23 DIAGNOSIS — G47.30 SLEEP APNEA, UNSPECIFIED TYPE: Chronic | ICD-10-CM

## 2022-12-23 DIAGNOSIS — R73.03 PREDIABETES: ICD-10-CM

## 2022-12-23 DIAGNOSIS — I10 ESSENTIAL HYPERTENSION: Chronic | ICD-10-CM

## 2022-12-23 PROBLEM — E66.01 SEVERE OBESITY (HCC): Status: RESOLVED | Noted: 2022-11-22 | Resolved: 2022-12-23

## 2022-12-23 PROBLEM — E66.811 CLASS 1 OBESITY DUE TO EXCESS CALORIES WITH SERIOUS COMORBIDITY AND BODY MASS INDEX (BMI) OF 31.0 TO 31.9 IN ADULT: Status: ACTIVE | Noted: 2019-06-11

## 2022-12-23 PROBLEM — Z02.9 ADMINISTRATIVE ENCOUNTER: Status: RESOLVED | Noted: 2021-06-30 | Resolved: 2022-12-23

## 2022-12-23 LAB — EKG IMPRESSION: NORMAL

## 2022-12-23 PROCEDURE — 93000 ELECTROCARDIOGRAM COMPLETE: CPT | Performed by: INTERNAL MEDICINE

## 2022-12-23 PROCEDURE — 99204 OFFICE O/P NEW MOD 45 MIN: CPT | Performed by: NURSE PRACTITIONER

## 2022-12-23 RX ORDER — AMLODIPINE BESYLATE 10 MG/1
10 TABLET ORAL EVERY EVENING
Qty: 100 TABLET | Refills: 3 | Status: SHIPPED | OUTPATIENT
Start: 2022-12-23 | End: 2023-01-09 | Stop reason: SDUPTHER

## 2022-12-23 RX ORDER — METOPROLOL SUCCINATE 50 MG/1
50 TABLET, EXTENDED RELEASE ORAL EVERY EVENING
Qty: 100 TABLET | Refills: 3 | Status: SHIPPED | OUTPATIENT
Start: 2022-12-23 | End: 2023-01-09 | Stop reason: SDUPTHER

## 2022-12-23 ASSESSMENT — ENCOUNTER SYMPTOMS
PND: 0
FEVER: 0
CLAUDICATION: 0
PALPITATIONS: 0
SHORTNESS OF BREATH: 0
DIZZINESS: 0
ORTHOPNEA: 0
ABDOMINAL PAIN: 0
MYALGIAS: 0
COUGH: 0

## 2022-12-23 ASSESSMENT — FIBROSIS 4 INDEX: FIB4 SCORE: 1.43

## 2022-12-23 NOTE — PROGRESS NOTES
Chief Complaint   Patient presents with    Abnormal EKG    Atrial Fibrillation    Dyslipidemia     Subjective     Sanjeev Charles is a 57 y.o. male who presents today for new patient consultation referred by his PCP Dr. Vasquez for known afib.    Hx of paroxysmal afib on chronic anticoagulation, HTN, HLD, obesity with MARCELA, and pre-diabetes.     He presents today alone. He is  with children in their 20's. 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    He has chronic lower extremity swelling which is unchanged since last year. He tries to wear compression stockings for this.    He goes on walks 3-4 times a week for 2.5 miles. He has a prior smoking history of 1 pack every 2 weeks for a few years.    He drinks occasional ETOH and daily caffeine. No drug use.    He is asymptomatic to his afib. Diagnosed by chart in June of '21, no prior cardiology apt.    He has no chest pain, shortness of breath, dizziness/lightheadedness, or palpitations.    Past Medical History:   Diagnosis Date    Alcohol abuse 11/8/2021    Allergy     Anxiety 6/11/2019    Asthma     Bronchitis     Chronic hepatitis C without hepatic coma (HCC) 6/11/2019    Clotting disorder (HCC)     1967 brain clot as child    Depression     Dyslipidemia 5/6/2019    HTN (hypertension)     Hypertension     Irregular heart beat 5/6/2019    Pneumonia     Sleep apnea      Past Surgical History:   Procedure Laterality Date    CRANIOTOMY BRAIN LAB  1967    APPENDECTOMY      OTHER NEUROLOGICAL SURG      removal of brain tumor    OTHER ORTHOPEDIC SURGERY      surgery to bilat knees     Family History   Problem Relation Age of Onset    Lung Disease Mother         COPD    Heart Disease Mother         CHF/Pacemaker    Cancer Mother         breast cancer    Heart Disease Father     Stroke Father     Cancer Sister         berast cancer     Social History     Socioeconomic History    Marital status:      Spouse name: Not on file     Number of children: Not on file    Years of education: Not on file    Highest education level: Not on file   Occupational History    Not on file   Tobacco Use    Smoking status: Former     Packs/day: 0.25     Years: 20.00     Pack years: 5.00     Types: Cigarettes    Smokeless tobacco: Current     Types: Chew    Tobacco comments:     Dip coppenhagen for many years.   Vaping Use    Vaping Use: Former    Substances: THC   Substance and Sexual Activity    Alcohol use: Yes     Alcohol/week: 3.6 oz     Types: 6 Cans of beer per week     Comment: daily, 2 drinks    Drug use: Not Currently     Types: Marijuana     Comment: Smoke    Sexual activity: Yes     Partners: Female     Comment: LIves with wife and 2 kids. 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   Other Topics Concern    Not on file   Social History Narrative    Not on file     Social Determinants of Health     Financial Resource Strain: Not on file   Food Insecurity: Not on file   Transportation Needs: Not on file   Physical Activity: Not on file   Stress: Not on file   Social Connections: Not on file   Intimate Partner Violence: Not on file   Housing Stability: Not on file     No Known Allergies  Outpatient Encounter Medications as of 12/23/2022   Medication Sig Dispense Refill    POTASSIUM PO Take 200 mg by mouth.      rivaroxaban (XARELTO) 20 MG Tab tablet Take 1 Tablet by mouth with dinner. 90 Tablet 1    amLODIPine (NORVASC) 10 MG Tab TAKE 1 TABLET DAILY 30 Tablet 0    FLUoxetine (PROZAC) 20 MG Cap Take 1 Capsule by mouth every day. 90 Capsule 0    metoprolol SR (TOPROL XL) 50 MG TABLET SR 24 HR Take 1 Tablet by mouth every day. 90 Tablet 1     No facility-administered encounter medications on file as of 12/23/2022.     Review of Systems   Constitutional:  Negative for fever and malaise/fatigue.   Respiratory:  Negative for cough and shortness of breath.    Cardiovascular:  Positive for leg swelling. Negative for chest pain,  "palpitations, orthopnea, claudication and PND.        Chronic for the last year   Gastrointestinal:  Negative for abdominal pain.   Musculoskeletal:  Negative for myalgias.   Neurological:  Negative for dizziness.            Objective     BP (!) 0/0 (BP Location: Left arm, Patient Position: Sitting, BP Cuff Size: Adult)   Ht 1.905 m (6' 3\")   BMI 31.12 kg/m²     Physical Exam  Vitals and nursing note reviewed.   Constitutional:       Appearance: Normal appearance. He is well-developed. He is obese.   HENT:      Head: Normocephalic and atraumatic.   Neck:      Vascular: No JVD.   Cardiovascular:      Rate and Rhythm: Normal rate and regular rhythm.      Pulses: Normal pulses.      Heart sounds: Normal heart sounds.   Pulmonary:      Effort: Pulmonary effort is normal.      Breath sounds: Normal breath sounds.   Musculoskeletal:         General: Normal range of motion.   Skin:     General: Skin is warm and dry.      Capillary Refill: Capillary refill takes less than 2 seconds.   Neurological:      General: No focal deficit present.      Mental Status: He is alert and oriented to person, place, and time. Mental status is at baseline.   Psychiatric:         Mood and Affect: Mood normal.         Behavior: Behavior normal.         Thought Content: Thought content normal.         Judgment: Judgment normal.              Assessment & Plan     1. Atrial fibrillation, unspecified type (HCC)  EKG      2. Essential hypertension        3. Long term (current) use of anticoagulants        4. Prediabetes        5. Sleep apnea, unspecified type        6. Dyslipidemia        7. BMI 30.0-30.9,adult          Medical Decision Making: Today's Assessment/Status/Plan:      1. Persistent afib on chronic anticoagulation  -asymptomatic, rate controlled  -cont toprol and xarelto for now  -he would like to transition to warfarin for cost  -referral to anticoagulation clinic placed  -no prior cardiology apt since diagnosis of afib in June " '21  -recommend echo for structural eval  -noted to have LE edema  -consider DCCV or possible referral to EP for ablation  -follow after echo    2. HTN  -cont toprol and amlodipine  -amlodipine could be cause of leg swelling, consider transition to alternative anti-hypertensive  -low K in the past, supplemental K OTC  -BP goal <130/80  -managed by PCP    3. Obesity with MARCELA  -unsure if treating  -work on weight loss with diet/exercise  -follow at next apt    4. Pre-diabetes  -follow with PCP with labs and diet changes    Patient is to follow up with Ketty VANG in 6 months with echo.

## 2022-12-23 NOTE — PATIENT INSTRUCTIONS
I have refilled all your heart medications for one year.    You can take the heart medications in the evening.    We will get an echocardiogram to check your heart function with your chronic afib.    I have referred you over to the anticoagulation clinic to consider transition to warfarin off xarelto for cost.

## 2023-01-09 DIAGNOSIS — I10 ESSENTIAL HYPERTENSION: Chronic | ICD-10-CM

## 2023-01-09 DIAGNOSIS — F41.9 ANXIETY: Chronic | ICD-10-CM

## 2023-01-09 RX ORDER — AMLODIPINE BESYLATE 10 MG/1
10 TABLET ORAL EVERY EVENING
Qty: 100 TABLET | Refills: 3 | Status: SHIPPED | OUTPATIENT
Start: 2023-01-09 | End: 2023-10-17 | Stop reason: SDUPTHER

## 2023-01-09 RX ORDER — FLUOXETINE HYDROCHLORIDE 20 MG/1
20 CAPSULE ORAL DAILY
Qty: 90 CAPSULE | Refills: 0 | Status: SHIPPED | OUTPATIENT
Start: 2023-01-09 | End: 2023-08-07 | Stop reason: SDUPTHER

## 2023-01-09 RX ORDER — METOPROLOL SUCCINATE 50 MG/1
50 TABLET, EXTENDED RELEASE ORAL EVERY EVENING
Qty: 100 TABLET | Refills: 3 | Status: SHIPPED | OUTPATIENT
Start: 2023-01-09 | End: 2023-10-17 | Stop reason: SDUPTHER

## 2023-04-19 DIAGNOSIS — I48.91 ATRIAL FIBRILLATION, UNSPECIFIED TYPE (HCC): Chronic | ICD-10-CM

## 2023-05-08 ENCOUNTER — TELEPHONE (OUTPATIENT)
Dept: MEDICAL GROUP | Facility: PHYSICIAN GROUP | Age: 58
End: 2023-05-08
Payer: COMMERCIAL

## 2023-05-08 ENCOUNTER — TELEPHONE (OUTPATIENT)
Dept: CARDIOLOGY | Facility: MEDICAL CENTER | Age: 58
End: 2023-05-08

## 2023-05-08 DIAGNOSIS — I48.91 ATRIAL FIBRILLATION, UNSPECIFIED TYPE (HCC): Chronic | ICD-10-CM

## 2023-05-08 RX ORDER — WARFARIN SODIUM 5 MG/1
5 TABLET ORAL DAILY
Qty: 30 TABLET | Refills: 3 | Status: SHIPPED | OUTPATIENT
Start: 2023-05-08 | End: 2023-10-17 | Stop reason: SDUPTHER

## 2023-05-08 NOTE — TELEPHONE ENCOUNTER
SC          Caller: Sanjeev Charles      Topic/issue: Patient was calling about a medication that was sent to his PCP that was meant to be an alternative to a medication he takes and he was asking for a call back see what was going on with it      Callback Number: [phone]      Thank you    -Neftaly KISER

## 2023-05-08 NOTE — TELEPHONE ENCOUNTER
Caller Name: Sanjeev  Call Back Number: [phone]      How would the patient prefer to be contacted with a response: Phone call OK to leave a detailed message    Sanjeev called said that the Xarelto is to expensive that its costing him around 600 a month. His cardiologist advice him that she was going to send you an email with a cheaper option. Patient has been off the Xarelto at least 3 wks.      Thank you,  Keisha Hernandes  Medical Assistant

## 2023-05-09 ENCOUNTER — PATIENT MESSAGE (OUTPATIENT)
Dept: CARDIOLOGY | Facility: MEDICAL CENTER | Age: 58
End: 2023-05-09
Payer: COMMERCIAL

## 2023-06-23 ENCOUNTER — ANTICOAGULATION VISIT (OUTPATIENT)
Dept: MEDICAL GROUP | Facility: PHYSICIAN GROUP | Age: 58
End: 2023-06-23
Payer: COMMERCIAL

## 2023-06-23 DIAGNOSIS — Z79.01 LONG TERM (CURRENT) USE OF ANTICOAGULANTS: ICD-10-CM

## 2023-06-23 DIAGNOSIS — I48.91 ATRIAL FIBRILLATION, UNSPECIFIED TYPE (HCC): Chronic | ICD-10-CM

## 2023-06-23 LAB — INR PPP: 1.2 (ref 2–3.5)

## 2023-06-23 PROCEDURE — 99211 OFF/OP EST MAY X REQ PHY/QHP: CPT | Performed by: FAMILY MEDICINE

## 2023-06-23 PROCEDURE — 85610 PROTHROMBIN TIME: CPT | Performed by: FAMILY MEDICINE

## 2023-06-23 NOTE — PROGRESS NOTES
OP Anticoagulation Service Note    Date: 2023    Anticoagulation Summary  As of 2023      INR goal:  2.0-3.0   TTR:  0.0 %   INR used for dosin.20 (2023)   Warfarin maintenance plan:  5 mg (5 mg x 1) every day   Weekly warfarin total:  35 mg   Plan last modified:  Everett Renteria PharmD (2023)   Next INR check:  2023   Target end date:  Indefinite    Indications    Atrial fibrillation (HCC) [I48.91]  Long term (current) use of anticoagulants [Z79.01]                 Anticoagulation Episode Summary       INR check location:      Preferred lab:      Send INR reminders to:      Comments:            Anticoagulation Care Providers       Provider Role Specialty Phone number    Feliciano Vasquez M.D. Referring Internal Medicine [phone]    [facility] Responsible  [phone]          Anticoagulation Patient Findings  Patient Findings       Positives:  Signs/symptoms of bleeding (Scabs on pt's left arm and knees d/t falling from his EtOH intake.), Change in alcohol use (Pt drinks EtOH excessively (1/2 pint/day) and falls excessively. Will notify PCP regarding this - pt also states he drinks d/t depression and anxiety.), Missed doses (D/t forgetting 2/2 EtOH consumption.)    Negatives:  Signs/symptoms of thrombosis, Laboratory test error suspected, Change in health, Change in activity, Upcoming invasive procedure, Emergency department visit, Upcoming dental procedure, Extra doses, Change in medications, Change in diet/appetite, Hospital admission, Bruising, Other complaints            HPI:   Sanjeev Charles is in the Anticoagulation Clinic today for an INR check on their anticoagulation therapy.     The reason for today's visit is to prevent morbidity and mortality from a blood clot and/or stroke and to reduce the risk of bleeding while on a anticoagulant.     PCP:  Feliciano Vasquez M.D.  [address] [facility] [address] [phone]    3 vitals included with  today's appt-unless patient declined:  (BP, HR, weight, ht, RR)   There were no vitals filed for this visit.    Verified current warfarin dosing schedule - pt states he started warfarin on 5/8/23 d/t Xarelto being too costly even w/  coupon card.    Medications reconciled   Pt is not on antiplatelet therapy    Assessment:   INR  SUB-therapeutic.     Plan:  Pt to resume warfarin 5 mg once daily (pt has not take, the last week d/t his EtOH use - states he resumed warfarin this AM).    Counseled pt extensively on the differences between DOACs vs vitamin k antagonist as well as the importance of INR monitoring.    In regard to EtOH intake - advised pt that daily rec amt is 2 drinks/day. Advised he f/u w/ PCP regarding EtOH abuse. Counseled pt that if he ever falls and hits his head that he needs to go to the ED.    Follow up:  Follow up appointment in 3 days.       Other info:  Pt educated to contact our clinic with any changes in medications or s/s of bleeding or thrombosis.  Education was provided today regarding tips to reduce their bleed risk and dietary constraints while on a anticoagulant.    National Recommendations:  The CHEST guidelines recommends frequent INR monitoring at regular intervals (a few days up to a max of 12 weeks) to ensure patients are on the proper dose of warfarin, and patients are not having any complications from therapy.  INRs can dramatically change over a short time period due to diet, medications, and medical conditions.     Everett Renteria, PharmD, BCACP    CC: [facility]  Phone [phone] fax [phone]

## 2023-06-23 NOTE — Clinical Note
FYI on this pt - he & his wife informed me today that pt has a major issue w/ EtOH. Pt states he has uncontrolled depression and anxiety & drinks d/t this. I counseled pt excessively regarding the risks of his EtOH intake and being on anticoagulation. Pt is planning to s/w PARs to set up f/u w/ you regarding this, but just wanted to give you an FYI. Let me know if I can assist further!  Thank you, Everett

## 2023-06-24 PROBLEM — Z78.9 ALCOHOL USE: Status: ACTIVE | Noted: 2023-06-24

## 2023-06-24 PROBLEM — F10.90 ALCOHOL USE: Status: ACTIVE | Noted: 2023-06-24

## 2023-06-26 ENCOUNTER — ANTICOAGULATION VISIT (OUTPATIENT)
Dept: MEDICAL GROUP | Facility: PHYSICIAN GROUP | Age: 58
End: 2023-06-26
Payer: COMMERCIAL

## 2023-06-26 DIAGNOSIS — Z79.01 LONG TERM (CURRENT) USE OF ANTICOAGULANTS: Primary | ICD-10-CM

## 2023-06-26 DIAGNOSIS — I48.91 ATRIAL FIBRILLATION, UNSPECIFIED TYPE (HCC): Chronic | ICD-10-CM

## 2023-06-26 LAB — INR PPP: 1.3 (ref 2–3.5)

## 2023-06-26 PROCEDURE — 99211 OFF/OP EST MAY X REQ PHY/QHP: CPT | Performed by: INTERNAL MEDICINE

## 2023-06-26 PROCEDURE — 85610 PROTHROMBIN TIME: CPT | Performed by: INTERNAL MEDICINE

## 2023-06-26 NOTE — PROGRESS NOTES
Anticoagulation Summary  As of 2023      INR goal:  2.0-3.0   TTR:  0.0 % (3 d)   INR used for dosin.30 (2023)   Warfarin maintenance plan:  5 mg (5 mg x 1) every day   Weekly warfarin total:  35 mg   Plan last modified:  Sarahi NievesD (2023)   Next INR check:  2023   Target end date:  Indefinite    Indications    Atrial fibrillation (HCC) [I48.91]  Long term (current) use of anticoagulants [Z79.01]                 Anticoagulation Episode Summary       INR check location:      Preferred lab:      Send INR reminders to:      Comments:            Anticoagulation Care Providers       Provider Role Specialty Phone number    Feliciano Vasquez M.D. Referring Internal Medicine [phone]    [facility] Responsible  [phone]          Anticoagulation Patient Findings  Patient Findings       Negatives:  Signs/symptoms of thrombosis, Signs/symptoms of bleeding, Laboratory test error suspected, Change in health, Change in alcohol use, Change in activity, Upcoming invasive procedure, Emergency department visit, Upcoming dental procedure, Missed doses, Extra doses, Change in medications, Change in diet/appetite, Hospital admission, Bruising, Other complaints                  HPI:   Sanjeev Charles seen in clinic today, on anticoagulation therapy with warfarin due to history of atrial fibrillation.    Patient's previous INR was subtherapeutic at 1.2 on 23, at which time patient was instructed to restart warfarin at previous dose.  He returns to clinic today to recheck INR to ensure it is therapeutic and thus preventing possible clotting and/or bleeding/bruising complications.    CHADS-VASc = at least 1  (unadjusted ischemic stroke risk/year:  0.9%, which is low risk)    Does patient have any changes to current medical/health status since last appt (Y/N):  NO  Does patient have any signs/symptoms of bleeding and/or thrombosis since the last appt (Y/N):  NO  Does  patient have any interval changes to diet or medications since last appt (Y/N):  NO  Are there any complications or cost restrictions with current therapy (Y/N):  NO     Does patient have [facility] PCP? Yes, Feliciano Vasquez M.D. (If not, please document discussion that patient must be seen at [facility])       Vitals:  declined today    There were no vitals filed for this visit.     Asssessment:      INR subtherapeutic at 1.3, therefore increasing stroke risk   Reason(s) for out of range INR today:  just restarted warfarin last week.      Pt is not on antiplatelet therapy    Medication reconciliation completed today.    Plan:  Pt is to bolus with 7.5mg X 2, then continue with current warfarin dosing regimen.     Once again stressed importance of moderate alcohol intake in setting of anticoagulation, he voices understanding.    Follow up:  Because warfarin is a high risk medication and current CHEST guidelines recommend regular monitoring intervals (few days up to 12 weeks), will have patient return to clinic in 4 days to recheck INR.    Silverio Hays, PharmD, BCACP

## 2023-07-03 ENCOUNTER — DOCUMENTATION (OUTPATIENT)
Dept: VASCULAR LAB | Facility: MEDICAL CENTER | Age: 58
End: 2023-07-03
Payer: COMMERCIAL

## 2023-07-06 ENCOUNTER — TELEPHONE (OUTPATIENT)
Dept: VASCULAR LAB | Facility: MEDICAL CENTER | Age: 58
End: 2023-07-06
Payer: COMMERCIAL

## 2023-07-06 NOTE — TELEPHONE ENCOUNTER
Called pt regarding overdue INR - no answer. LVM asking pt to c/b or reschedule ASAP.    Everett Renteria, SarahiD, BCACP

## 2023-07-10 ENCOUNTER — TELEPHONE (OUTPATIENT)
Dept: VASCULAR LAB | Facility: MEDICAL CENTER | Age: 58
End: 2023-07-10
Payer: COMMERCIAL

## 2023-07-10 NOTE — LETTER
July 10, 2023        Sanjeev Charles  [address] [facility] [address]        Dear Sanjeev Charles,    We have been unsuccessful in our attempts to contact you regarding your Anticoagulation Service appointments.  Warfarin is a potent blood-thinning agent that requires frequent monitoring to measure its level in the blood.  If your level becomes too high, you could develop serious, sometimes life-threatening bleeding problems.  If your level becomes too low, life-threatening blood clots or stroke could result.     The last recorded INR that we have on file for you is:  INR   Date Value Ref Range Status   06/26/2023 1.30 (A) 2 - 3.5 Final     Comment:     [id number] 6/2024 ic valid     To monitor your warfarin effectively, we need to be able to communicate with you.  This is a requirement to be followed by our Service.  If we are unable to contact you through repeated occasions, you are at risk of being discharged from the Anticoagulation Service.     It is extremely important that you have your lab work drawn as soon as possible.  Alternatively, you may schedule an appointment for a fingerstick INR at our clinic.  We are open Monday-Friday 8 am until 5 pm.  You may reach our Service at [phone].        Sincerely,           Sawyer Comer, PharmD, [facility]  Clinic Supervisor  [facility]  Outpatient Anticoagulation Service

## 2023-07-10 NOTE — TELEPHONE ENCOUNTER
Pt is overdue for INR    Left message for pt to have INR checked   3rd call  Sent 1st letter    Pt last seen 6/26/23    Discharge criteria:  ? 3 calls  ? 1 letter  X Over 3 months (last seen 6/26, if no response by 9/26, will d/c from clinic)    Nhi Anderson, PharmD

## 2023-08-07 DIAGNOSIS — F41.9 ANXIETY: Chronic | ICD-10-CM

## 2023-08-07 RX ORDER — FLUOXETINE HYDROCHLORIDE 20 MG/1
20 CAPSULE ORAL DAILY
Qty: 90 CAPSULE | Refills: 0 | Status: SHIPPED | OUTPATIENT
Start: 2023-08-07 | End: 2023-09-27 | Stop reason: SDUPTHER

## 2023-09-27 ENCOUNTER — OFFICE VISIT (OUTPATIENT)
Dept: MEDICAL GROUP | Facility: PHYSICIAN GROUP | Age: 58
End: 2023-09-27
Payer: COMMERCIAL

## 2023-09-27 VITALS
TEMPERATURE: 98.4 F | RESPIRATION RATE: 20 BRPM | BODY MASS INDEX: 31.21 KG/M2 | SYSTOLIC BLOOD PRESSURE: 110 MMHG | HEART RATE: 80 BPM | DIASTOLIC BLOOD PRESSURE: 74 MMHG | WEIGHT: 251 LBS | OXYGEN SATURATION: 95 % | HEIGHT: 75 IN

## 2023-09-27 DIAGNOSIS — I10 ESSENTIAL HYPERTENSION: Chronic | ICD-10-CM

## 2023-09-27 DIAGNOSIS — E66.09 CLASS 1 OBESITY DUE TO EXCESS CALORIES WITH SERIOUS COMORBIDITY AND BODY MASS INDEX (BMI) OF 31.0 TO 31.9 IN ADULT: ICD-10-CM

## 2023-09-27 DIAGNOSIS — R73.03 PREDIABETES: ICD-10-CM

## 2023-09-27 DIAGNOSIS — F10.10 ALCOHOL ABUSE: ICD-10-CM

## 2023-09-27 DIAGNOSIS — F41.9 ANXIETY: Chronic | ICD-10-CM

## 2023-09-27 DIAGNOSIS — Z11.59 NEED FOR HEPATITIS C SCREENING TEST: ICD-10-CM

## 2023-09-27 DIAGNOSIS — I48.91 ATRIAL FIBRILLATION, UNSPECIFIED TYPE (HCC): ICD-10-CM

## 2023-09-27 DIAGNOSIS — E78.5 DYSLIPIDEMIA: Chronic | ICD-10-CM

## 2023-09-27 DIAGNOSIS — Z78.9 ALCOHOL USE: Chronic | ICD-10-CM

## 2023-09-27 DIAGNOSIS — Z00.00 WELL ADULT EXAM: ICD-10-CM

## 2023-09-27 PROBLEM — F10.90 ALCOHOL USE: Chronic | Status: ACTIVE | Noted: 2023-06-24

## 2023-09-27 PROCEDURE — 3078F DIAST BP <80 MM HG: CPT | Performed by: INTERNAL MEDICINE

## 2023-09-27 PROCEDURE — 99214 OFFICE O/P EST MOD 30 MIN: CPT | Performed by: INTERNAL MEDICINE

## 2023-09-27 PROCEDURE — 3074F SYST BP LT 130 MM HG: CPT | Performed by: INTERNAL MEDICINE

## 2023-09-27 RX ORDER — DISULFIRAM 500 MG/1
500 TABLET ORAL DAILY
Qty: 14 TABLET | Refills: 0 | Status: SHIPPED | OUTPATIENT
Start: 2023-09-27 | End: 2023-10-05 | Stop reason: SDUPTHER

## 2023-09-27 RX ORDER — FLUOXETINE HYDROCHLORIDE 20 MG/1
20 CAPSULE ORAL DAILY
Qty: 90 CAPSULE | Refills: 0 | Status: SHIPPED | OUTPATIENT
Start: 2023-09-27 | End: 2023-10-17 | Stop reason: SDUPTHER

## 2023-09-27 ASSESSMENT — PATIENT HEALTH QUESTIONNAIRE - PHQ9: CLINICAL INTERPRETATION OF PHQ2 SCORE: 0

## 2023-09-27 NOTE — ASSESSMENT & PLAN NOTE
Chronic condition.  Patient presently taking metoprolol 25 mg daily and warfarin 5 mg daily.  The patient denies any history of bleeding.

## 2023-09-27 NOTE — ASSESSMENT & PLAN NOTE
This is a chronic condition.  The patient is taking Prozac 20 Mg daily.  The patient denies SI.  Patient requests Rx refill.

## 2023-09-27 NOTE — ASSESSMENT & PLAN NOTE
Chronic condition.  Patient reported that he has been drinking half a pint of whiskey almost on daily basis.  The patient has been attending AA.  I did refer the patient to see addiction medicine specialist but the patient stated that he never follow-up.  I am also concerned and discussed with the patient that alcohol drinking can be significant risks while the patient taking warfarin therapy.    I did have an extended conversation with the patient today and counseled the patient to stop drinking completely.  Patient stated that he will think about it    In addition I did offer and work a prescription for Antabuse  Advised the patient start the medication 500 mg 12 hours after abstaining from alcohol use.

## 2023-09-27 NOTE — PROGRESS NOTES
PRIMARY CARE CLINIC VISIT        Chief Complaint   Patient presents with    Follow-Up      Alcohol use  Atrial fibrillation  Follow-up hypertension   hyperlipidemia  Obesity  Prediabetes  Rx refill      History of Present Illness     Alcohol abuse  Chronic condition.  Patient reported that he has been drinking half a pint of whiskey almost on daily basis.  The patient has been attending AA.  I did refer the patient to see addiction medicine specialist but the patient stated that he never follow-up.  I am also concerned and discussed with the patient that alcohol drinking can be significant risks while the patient taking warfarin therapy.    I did have an extended conversation with the patient today and counseled the patient to stop drinking completely.  Patient stated that he will think about it    In addition I did offer and work a prescription for Antabuse  Advised the patient start the medication 500 mg 12 hours after abstaining from alcohol use.    Atrial fibrillation (HCC)  Chronic condition.  Patient presently taking metoprolol 25 mg daily and warfarin 5 mg daily.  The patient denies any history of bleeding.    Dyslipidemia  Chronic condition.  The patient currently not on therapy.  Lab test ordered for follow-up.    Anxiety  This is a chronic condition.  The patient is taking Prozac 20 Mg daily.  The patient denies SI.  Patient requests Rx refill.    Essential hypertension  Chronic condition.  The patient takes amlodipine 10 mg daily.  Metoprolol 50 mg daily.  Blood pressure has been well controlled.  Patient tolerating medication well.    Class 1 obesity due to excess calories with serious comorbidity and body mass index (BMI) of 31.0 to 31.9 in adult  Chronic condition.    Counseling on health consequences related to obesity.  Discussed with the patient regarding diet, exercise, and lifestyle modification to help achieve and maintain healthy weight          Prediabetes  This is a chronic condition.  The  patient currently on diet therapy.  Recommend lab test to be done for follow-up    Current Outpatient Medications on File Prior to Visit   Medication Sig Dispense Refill    Metoprolol Succinate 25 MG Capsule ER 24 Hour Sprinkle 1 cap(s) orally once a day for 30 day(s)      furosemide (LASIX) 20 MG Tab Take 20 mg by mouth every day.      Potassium Gluconate 595 (99 K) MG Tab 1 tablet po QD      warfarin (COUMADIN) 5 MG Tab Take 1 Tablet by mouth every day. 30 Tablet 3    amLODIPine (NORVASC) 10 MG Tab Take 1 Tablet by mouth every evening. 100 Tablet 3    metoprolol SR (TOPROL XL) 50 MG TABLET SR 24 HR Take 1 Tablet by mouth every evening. 100 Tablet 3    POTASSIUM PO Take 200 mg by mouth.       No current facility-administered medications on file prior to visit.        Allergies: Patient has no known allergies.    Current Outpatient Medications Ordered in Epic   Medication Sig Dispense Refill    Disulfiram 500 MG Tab Take 1 Tablet by mouth every day. 14 Tablet 0    FLUoxetine (PROZAC) 20 MG Cap Take 1 Capsule by mouth every day. 90 Capsule 0    Metoprolol Succinate 25 MG Capsule ER 24 Hour Sprinkle 1 cap(s) orally once a day for 30 day(s)      furosemide (LASIX) 20 MG Tab Take 20 mg by mouth every day.      Potassium Gluconate 595 (99 K) MG Tab 1 tablet po QD      warfarin (COUMADIN) 5 MG Tab Take 1 Tablet by mouth every day. 30 Tablet 3    amLODIPine (NORVASC) 10 MG Tab Take 1 Tablet by mouth every evening. 100 Tablet 3    metoprolol SR (TOPROL XL) 50 MG TABLET SR 24 HR Take 1 Tablet by mouth every evening. 100 Tablet 3    POTASSIUM PO Take 200 mg by mouth.       No current Epic-ordered facility-administered medications on file.       Past Medical History:   Diagnosis Date    Alcohol abuse 11/8/2021    Allergy     Anxiety 6/11/2019    Asthma     Bronchitis     Chronic hepatitis C without hepatic coma (HCC) 6/11/2019    Clotting disorder (HCC)     1967 brain clot as child    Depression     Dyslipidemia 5/6/2019     "HTN (hypertension)     Hypertension     Irregular heart beat 5/6/2019    Pneumonia     Sleep apnea        Past Surgical History:   Procedure Laterality Date    CRANIOTOMY BRAIN LAB  1967    APPENDECTOMY      OTHER NEUROLOGICAL SURG      removal of brain tumor    OTHER ORTHOPEDIC SURGERY      surgery to bilat knees       Family History   Problem Relation Age of Onset    Lung Disease Mother         COPD    Heart Disease Mother         CHF/Pacemaker    Cancer Mother         breast cancer    Heart Disease Father     Stroke Father     Cancer Sister         berast cancer       Social History     Tobacco Use   Smoking Status Former    Current packs/day: 0.25    Average packs/day: 0.3 packs/day for 20.0 years (5.0 ttl pk-yrs)    Types: Cigarettes   Smokeless Tobacco Current    Types: Chew   Tobacco Comments    Dip copp[place]agen for many years.       Social History     Substance and Sexual Activity   Alcohol Use Yes    Alcohol/week: 3.6 oz    Types: 6 Cans of beer per week    Comment: daily, 2 drinks       Review of systems.  As per HPI above. All other systems reviewed and negative.      Past Medical, Social, and Family history reviewed and updated in EPIC     Objective     /74   Pulse 80   Temp 36.9 °C (98.4 °F) (Temporal)   Resp 20   Ht 1.905 m (6' 3\")   Wt 114 kg (251 lb)   SpO2 95%    Body mass index is 31.37 kg/m².    General: alert in no apparent distress.  Cardiovascular: Irregularly irregular   pulmonary: lungs : no wheezing   Gastrointestinal: BS present. No obvious mass noted        Lab Results   Component Value Date/Time    HBA1C 5.7 (H) 11/23/2022 01:13 PM    HBA1C 5.5 06/17/2021 11:14 AM    HBA1C 5.8 (H) 05/06/2019 10:48 AM       Lab Results   Component Value Date/Time    WBC 9.9 11/23/2022 01:13 PM    HEMOGLOBIN 16.2 11/23/2022 01:13 PM    HEMATOCRIT 47.0 11/23/2022 01:13 PM    MCV 92.7 11/23/2022 01:13 PM    PLATELETCT 244 11/23/2022 01:13 PM         Lab Results   Component Value Date/Time    SODIUM " 139 11/23/2022 01:13 PM    POTASSIUM 3.9 11/23/2022 01:13 PM    GLUCOSE 93 11/23/2022 01:13 PM    BUN 14 11/23/2022 01:13 PM    CREATININE 1.10 11/23/2022 01:13 PM       Lab Results   Component Value Date/Time    CHOLSTRLTOT 193 11/23/2022 01:13 PM    TRIGLYCERIDE 74 11/23/2022 01:13 PM    HDL 58 11/23/2022 01:13 PM     (H) 11/23/2022 01:13 PM       Lab Results   Component Value Date/Time    ALTSGPT 33 06/21/2021 06:47 PM             Assessment and Plan     1. Alcohol abuse  Chronic condition.  Uncontrolled.  Counseling given to the patient today.  Strongly advised the patient to quit alcohol use.  Recommend patient to start Antabuse 500 mg daily after abstaining from alcohol for 12 hours.  14 tablets ordered. Potential side effects of med discussed w pt.  Also refer the patient to addiction medicine specialist.  Lab test ordered to check liver panel and liver ultrasound also requested.    - Referral to Behavioral Health  - Disulfiram 500 MG Tab; Take 1 Tablet by mouth every day.  Dispense: 14 Tablet; Refill: 0  - HEPATIC FUNCTION PANEL; Future  - US-RUQ; Future    2. Anxiety  Chronic stable condition per continue with Prozac 20 Mg daily for    - FLUoxetine (PROZAC) 20 MG Cap; Take 1 Capsule by mouth every day.  Dispense: 90 Capsule; Refill: 0    3. Atrial fibrillation, unspecified type (HCC)  Chronic condition.  Heart rate stable.  Continue metoprolol 50 mg daily and warfarin 5 mg daily.  INR to be monitored by Coumadin clinic.    4. Dyslipidemia  Chronic condition.  Current status unclear.  Lab test ordered to check lipid panel.  Continue with low fat low-cholesterol diet.    5. Essential hypertension  Chronic stable condition.  Continue metoprolol 50 mg daily and amlodipine 10 mg daily    6. Class 1 obesity due to excess calories with serious comorbidity and body mass index (BMI) of 31.0 to 31.9 in adult  Chronic condition.  Uncontrolled.  Advised the patient diet and lifestyle modification.  The patient  will try to lose weight.    7. Prediabetes chronic condition.  Current status unclear.  Lab test ordered for follow-up  - HEMOGLOBIN A1C; Future    8. Well adult exam  - CBC WITHOUT DIFFERENTIAL; Future  - Lipid Profile; Future  - PROSTATE SPECIFIC AG SCREENING; Future  - TSH; Future    9. Need for hepatitis C screening test  - HEP C VIRUS ANTIBODY; Future                      Please note that this dictation was created using voice recognition software. I have made every reasonable attempt to correct obvious errors, but I expect that there are errors of grammar and possibly content that I did not discover before finalizing the note.    Feliciano Vasquez MD  Internal Medicine  [facility]

## 2023-09-27 NOTE — ASSESSMENT & PLAN NOTE
This is a chronic condition.  The patient currently on diet therapy.  Recommend lab test to be done for follow-up

## 2023-09-27 NOTE — ASSESSMENT & PLAN NOTE
Chronic condition.  The patient takes amlodipine 10 mg daily.  Metoprolol 50 mg daily.  Blood pressure has been well controlled.  Patient tolerating medication well.

## 2023-10-05 DIAGNOSIS — F10.10 ALCOHOL ABUSE: ICD-10-CM

## 2023-10-05 RX ORDER — DISULFIRAM 500 MG/1
500 TABLET ORAL DAILY
Qty: 14 TABLET | Refills: 0 | Status: SHIPPED | OUTPATIENT
Start: 2023-10-05 | End: 2023-10-19

## 2023-10-17 ENCOUNTER — TELEPHONE (OUTPATIENT)
Dept: MEDICAL GROUP | Facility: PHYSICIAN GROUP | Age: 58
End: 2023-10-17
Payer: COMMERCIAL

## 2023-10-17 DIAGNOSIS — F41.9 ANXIETY: Chronic | ICD-10-CM

## 2023-10-17 DIAGNOSIS — I10 ESSENTIAL HYPERTENSION: Chronic | ICD-10-CM

## 2023-10-17 RX ORDER — METOPROLOL SUCCINATE 50 MG/1
50 TABLET, EXTENDED RELEASE ORAL EVERY EVENING
Qty: 100 TABLET | Refills: 3 | Status: SHIPPED | OUTPATIENT
Start: 2023-10-17 | End: 2024-02-20 | Stop reason: SDUPTHER

## 2023-10-17 RX ORDER — FLUOXETINE HYDROCHLORIDE 20 MG/1
20 CAPSULE ORAL DAILY
Qty: 90 CAPSULE | Refills: 0 | Status: SHIPPED | OUTPATIENT
Start: 2023-10-17 | End: 2024-03-26

## 2023-10-17 RX ORDER — FUROSEMIDE 20 MG/1
20 TABLET ORAL DAILY
Qty: 90 TABLET | Refills: 1 | Status: SHIPPED | OUTPATIENT
Start: 2023-10-17 | End: 2024-02-20 | Stop reason: SDUPTHER

## 2023-10-17 RX ORDER — AMLODIPINE BESYLATE 10 MG/1
10 TABLET ORAL EVERY EVENING
Qty: 100 TABLET | Refills: 3 | Status: SHIPPED | OUTPATIENT
Start: 2023-10-17 | End: 2024-01-17 | Stop reason: CLARIF

## 2023-10-17 RX ORDER — WARFARIN SODIUM 5 MG/1
5 TABLET ORAL DAILY
Qty: 30 TABLET | Refills: 3 | Status: SHIPPED | OUTPATIENT
Start: 2023-10-17 | End: 2023-11-06

## 2023-10-17 NOTE — TELEPHONE ENCOUNTER
[facility] in [place], [place]  doesn't carry the Disulfiram 500mg and patient is requesting a new prescription for disulfiram 250mg BID which [facility] carries. Can you please advise.    Thank you,  Keisha Hernandes  Medical Assistant

## 2023-10-19 ENCOUNTER — TELEPHONE (OUTPATIENT)
Dept: MEDICAL GROUP | Facility: PHYSICIAN GROUP | Age: 58
End: 2023-10-19
Payer: COMMERCIAL

## 2023-10-19 ENCOUNTER — PATIENT MESSAGE (OUTPATIENT)
Dept: MEDICAL GROUP | Facility: PHYSICIAN GROUP | Age: 58
End: 2023-10-19
Payer: COMMERCIAL

## 2023-10-19 NOTE — TELEPHONE ENCOUNTER
Caller Name: Pharmacist form [facility] on [facility]  Call Back Number: [phone]    How would the patient prefer to be contacted with a response: Phone call do NOT leave a detailed message    Pharmacist would like to report that Sanjeev is on Warfarin and you sent an Rx for Disulfiram which can have a serious interaction.   Can you please advise.    Thank you,  Keisha Hernandes  Medical Assistant

## 2023-10-20 NOTE — TELEPHONE ENCOUNTER
Chart note:    10/19/2023 at 5:52 PM     Pt's name: Sanjeev Charles [phone] (home)    /  PCP: Feliciano Vasquez M.D..    The patient contacted our office to request refill for disulfiram    -I called and spoke to the patient on his phone.    The patient was seen September 27, 2023 and we discussed the alcohol abuse condition being this visit.  .  The patient also with atrial fibrillation presently taking warfarin   Due to the potential drug interactions between disulfiram and warfarin which may cause increased risks of bleeding. Therefore we have agreed to prescribe disulfiram only for 14 days.       I have informed the patient that I am unable to renew the Disulfiram due to potential drug interactions between warfarin and disulfiram as above..     I have discussed with the patient that we could switch the anticoagulation therapy to either Xarelto or Eliquis for which the patient may take disulfiram along.  The patient however refused to take Xarelto or Eliquis due to high cost of the medication.    During the previous visit I also recommended  and referred  the patient to see  addiction medicine specialist.  The patient however has not scheduled the appointment.    Behavioral Health Outpatient  [facility]NS[facility] LA[facility] [address]  [facility] [address]  Phone: [phone]    Today , the Contact information for dr Lawler has  given to patient and advised the patient to contact the specialist ASAP    Pt voiced understanding and will contact dr Lawler's office for a consultation.                    Feliciano Vasquez M.D.

## 2023-10-26 ENCOUNTER — TELEPHONE (OUTPATIENT)
Dept: MEDICAL GROUP | Facility: PHYSICIAN GROUP | Age: 58
End: 2023-10-26
Payer: COMMERCIAL

## 2023-10-27 NOTE — TELEPHONE ENCOUNTER
Chart note:    10/26/2023 at 5:54 PM     Pt's name: Sanjeev Charles [phone] (home)    /  PCP: Feliciano Vasquez M.D..      -I called and spoke to the patient on his phone.    The patient once again contacted our office and requested a refill for disulfiram.  Currently he also take warfarin.     As in previous note, the patient was seen September 27, 2023 and we discussed the alcohol abuse condition being this visit.  .  The patient also with atrial fibrillation presently taking warfarin   Due to the potential drug interactions between disulfiram and warfarin which may cause increased risks of bleeding. Therefore we have agreed to prescribe disulfiram only for 14 days.         I have informed the patient that I am unable to renew the Disulfiram due to potential drug interactions between warfarin and disulfiram as above..      I have discussed with the patient that we could switch the anticoagulation therapy to either Xarelto or Eliquis for which the patient may take disulfiram along.  The patient however refused to take Xarelto or Eliquis due to high cost of the medication.     During the previous visit I also recommended  and referred  the patient to see  addiction medicine specialist.  The patient however has not scheduled the appointment.     Behavioral Health Outpatient  [facility]  [address] [place] [address] [address]  [facility] [address]  Phone: [phone]     Today , the Contact information for dr Lawler has  given to patient and advised the patient to contact the specialist ASAP     Pt voiced understanding and will contact dr Lawler's office for a consultation.                      Feliciano Vasquez M.D.

## 2023-11-06 ENCOUNTER — TELEPHONE (OUTPATIENT)
Dept: MEDICAL GROUP | Facility: PHYSICIAN GROUP | Age: 58
End: 2023-11-06
Payer: COMMERCIAL

## 2023-11-06 NOTE — TELEPHONE ENCOUNTER
VOICEMAIL  1. Caller Name: Sanjeev Charles                        Call Back Number: [phone] (home)       2. Message: pt is asking for as an alternative to warfarin he would like to try naltrexone.    3. Patient approves office to leave a detailed voicemail/MyChart message: N\A

## 2023-12-21 ENCOUNTER — TELEPHONE (OUTPATIENT)
Dept: CARDIOLOGY | Facility: MEDICAL CENTER | Age: 58
End: 2023-12-21
Payer: COMMERCIAL

## 2023-12-22 NOTE — TELEPHONE ENCOUNTER
Called Pt, to get their echo done. Per the patients response they said they would probably not even be here for their appt on the 29DEC23 since they are in [facility].

## 2023-12-29 ENCOUNTER — TELEPHONE (OUTPATIENT)
Dept: CARDIOLOGY | Facility: MEDICAL CENTER | Age: 58
End: 2023-12-29
Payer: COMMERCIAL

## 2023-12-29 ENCOUNTER — APPOINTMENT (OUTPATIENT)
Dept: CARDIOLOGY | Facility: MEDICAL CENTER | Age: 58
End: 2023-12-29
Attending: NURSE PRACTITIONER
Payer: COMMERCIAL

## 2023-12-29 NOTE — TELEPHONE ENCOUNTER
Got a message from the MA that Salo wants to reschedule his appt. He has with Ketty Meraz today. Called and LVM for him to call back to reschedule the appt.

## 2024-01-04 RX ORDER — DISULFIRAM 250 MG/1
250 TABLET ORAL DAILY
Qty: 30 TABLET | Refills: 1 | Status: SHIPPED | OUTPATIENT
Start: 2024-01-04

## 2024-01-17 RX ORDER — WARFARIN SODIUM 5 MG/1
5 TABLET ORAL DAILY
Qty: 30 TABLET | Refills: 0 | Status: SHIPPED | OUTPATIENT
Start: 2024-01-17 | End: 2024-01-17

## 2024-01-17 NOTE — TELEPHONE ENCOUNTER
The original prescription was discontinued on 10/17/2023 by Feliciano Vasquez M.D. for the following reason: Reorder. Renewing this prescription may not be appropriate.

## 2024-02-14 ENCOUNTER — TELEPHONE (OUTPATIENT)
Dept: MEDICAL GROUP | Facility: PHYSICIAN GROUP | Age: 59
End: 2024-02-14
Payer: COMMERCIAL

## 2024-02-15 NOTE — TELEPHONE ENCOUNTER
1. Caller Name: Sanjeev Charles                          Call Back Number: [phone] (home)         How would the patient prefer to be contacted with a response: Phone call do NOT leave a detailed message    Pt states he is in [facility] and wanted warfarin rx sent there.  Per Dr Vasquez pt cannot take warfarin if he is taking disulfiram.  Pt states he still is but minimally.  Pt was informed again that he needs to take Eliquis if he is taking disulfiram.  Pt states he cannot afford Eliquis and is going to run out of blood thinner.

## 2024-02-15 NOTE — TELEPHONE ENCOUNTER
Please call pt    As per previous discussions with the pt [please refer to my previous documentation in chart]    If pt is to continue with Disulfiram: we recommend pt to take xarelto / not coumadin due to drug interactions.    I would recommend for pt to see another doctor if pt disagreed  with my recommendation.    Thank you.

## 2024-02-20 ENCOUNTER — TELEPHONE (OUTPATIENT)
Dept: MEDICAL GROUP | Facility: PHYSICIAN GROUP | Age: 59
End: 2024-02-20
Payer: COMMERCIAL

## 2024-02-20 DIAGNOSIS — I10 ESSENTIAL HYPERTENSION: Chronic | ICD-10-CM

## 2024-02-20 RX ORDER — FUROSEMIDE 20 MG/1
20 TABLET ORAL DAILY
Qty: 90 TABLET | Refills: 1 | Status: SHIPPED | OUTPATIENT
Start: 2024-02-20 | End: 2024-03-27

## 2024-02-20 RX ORDER — METOPROLOL SUCCINATE 50 MG/1
50 TABLET, EXTENDED RELEASE ORAL EVERY EVENING
Qty: 100 TABLET | Refills: 3 | Status: SHIPPED | OUTPATIENT
Start: 2024-02-20

## 2024-02-20 NOTE — TELEPHONE ENCOUNTER
Name: Sanjeev Charles  Phone number: [phone]     Message: Patient left a voicemail stating that he needs a refill on his blood pressure medication. Patient states that he can't afford Xerelto anymore, he needs an alternative mediation. Patient also stated that he couldn't go to work because he feels like his heart is pumping hard.

## 2024-02-21 ENCOUNTER — TELEPHONE (OUTPATIENT)
Dept: MEDICAL GROUP | Facility: PHYSICIAN GROUP | Age: 59
End: 2024-02-21
Payer: COMMERCIAL

## 2024-02-22 NOTE — TELEPHONE ENCOUNTER
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 I called patient to speak to him, no answer, left voicemail for patient to call for an appointment.

## 2024-02-25 ASSESSMENT — ENCOUNTER SYMPTOMS: JOINT SWELLING: 1

## 2024-02-28 ENCOUNTER — APPOINTMENT (OUTPATIENT)
Dept: RADIOLOGY | Facility: OTHER | Age: 59
End: 2024-02-28
Attending: FAMILY MEDICINE
Payer: COMMERCIAL

## 2024-02-28 ENCOUNTER — OFFICE VISIT (OUTPATIENT)
Dept: SPORTS MEDICINE | Facility: OTHER | Age: 59
End: 2024-02-28
Payer: COMMERCIAL

## 2024-02-28 VITALS
HEART RATE: 84 BPM | DIASTOLIC BLOOD PRESSURE: 76 MMHG | RESPIRATION RATE: 18 BRPM | SYSTOLIC BLOOD PRESSURE: 122 MMHG | WEIGHT: 251 LBS | TEMPERATURE: 97.9 F | HEIGHT: 75 IN | BODY MASS INDEX: 31.21 KG/M2 | OXYGEN SATURATION: 95 %

## 2024-02-28 DIAGNOSIS — M25.561 BILATERAL CHRONIC KNEE PAIN: ICD-10-CM

## 2024-02-28 DIAGNOSIS — M17.11 PRIMARY OSTEOARTHRITIS OF RIGHT KNEE: ICD-10-CM

## 2024-02-28 DIAGNOSIS — M17.12 PRIMARY OSTEOARTHRITIS OF LEFT KNEE: ICD-10-CM

## 2024-02-28 DIAGNOSIS — M25.562 BILATERAL CHRONIC KNEE PAIN: ICD-10-CM

## 2024-02-28 DIAGNOSIS — G89.29 BILATERAL CHRONIC KNEE PAIN: ICD-10-CM

## 2024-02-28 PROCEDURE — 3074F SYST BP LT 130 MM HG: CPT | Performed by: FAMILY MEDICINE

## 2024-02-28 PROCEDURE — 3078F DIAST BP <80 MM HG: CPT | Performed by: FAMILY MEDICINE

## 2024-02-28 PROCEDURE — 73564 X-RAY EXAM KNEE 4 OR MORE: CPT | Mod: TC,FY,LT | Performed by: RADIOLOGY

## 2024-02-28 PROCEDURE — 99213 OFFICE O/P EST LOW 20 MIN: CPT | Performed by: FAMILY MEDICINE

## 2024-02-28 ASSESSMENT — ENCOUNTER SYMPTOMS: FEVER: 0

## 2024-02-28 NOTE — LETTER
"    February 28, 2024      Short-term  Staying off the knee somewhat, and avoiding heavy lifting and heavy exercise can be beneficial  Mild activity such as going for walks and range of motion exercises are good  Ice will help temporarily  Knee braces help temporarily (these do not need to be heavy knee braces and could be a simple compression wrap)  Topical diclofenac cream or capsaicin ointment will both make the knee feel better  Injection into the knee with steroids could help greatly    Long-term  Regular exercise to keep the knee muscles strong without doing types of exercise which exacerbate the pain.  The best types of exercises are smooth motions and with minimal pounding.  Running and jumping will make the pain worse.  Cycling and pool exercise are fantastic.  Working with the physical therapist for a short time can be greatly beneficial  Some people need intermittent injections to help knee pain long-term, sometimes these are steroid (\"cortisone\") shots, and sometimes they are something else called hyaluronic acid (Euflexxa, Synvisc, Orthovisc, Hyalgan)     Also an option   Knee surgery - knee replacement  Surgery is not 100% needed, but is an option   If you would like to discuss surgery, we are happy to help get you to an office with a knee replacement specialist       Stan Langford M.D.  [phone]                  "

## 2024-02-28 NOTE — PROGRESS NOTES
"Subjective:     Sanjeev Charles is a 58 y.o. male who presents for Knee Pain (Bilateral knee pain )    HPI  Pt presents for evaluation of chronic knee pain bilaterally  Patient with knee pain which started without fall or injury  Has had pain for years  States that he was evaluated by [facility] a few years ago and had x-rays which were \"bone-on-bone arthritis\"  Has history of injuries and surgeries to both knees, but these were many years ago and has not had any recent injuries  Pt with surgery on right knee after knee dislocation over 40 years ago   Had left ACL reconstruction   Current pain is more in the anterior knee  Has frequent popping which is painless  Has frequent locking/catching of the area  Feels that the range of motion of his knees is limited and cannot fully flex    Review of Systems   Constitutional:  Negative for fever.   Musculoskeletal:  Positive for joint pain.   Skin:  Negative for rash.     PMH:  has a past medical history of Alcohol abuse (11/8/2021), Allergy, Anxiety (6/11/2019), Asthma, Bronchitis, Chronic hepatitis C without hepatic coma (HCC) (6/11/2019), Clotting disorder (HCC), Depression, Dyslipidemia (5/6/2019), HTN (hypertension), Hypertension, Irregular heart beat (5/6/2019), Pneumonia, and Sleep apnea.    He has no past medical history of Angina, Arthritis, Backpain, Blood transfusion without reported diagnosis, Cancer (HCC), CATARACT, Congestive heart failure (HCC), COPD, Diabetes, Dialysis, GERD (gastroesophageal reflux disease), Glaucoma, Heart murmur, Heart valve disease, IBD (inflammatory bowel disease), Indigestion, Jaundice, Migraine, Myocardial infarct (HCC), Other specified symptom associated with female genital organs, Pacemaker, Personal history of venous thrombosis and embolism, Renal disorder, Rheumatic fever, Seizure (HCC), Stroke (HCC), Unspecified disorder of thyroid, Unspecified hemorrhagic conditions, or Unspecified urinary incontinence.  MEDS: " "  Current Outpatient Medications:     furosemide (LASIX) 20 MG Tab, Take 1 Tablet by mouth every day., Disp: 90 Tablet, Rfl: 1    metoprolol SR (TOPROL XL) 50 MG TABLET SR 24 HR, Take 1 Tablet by mouth every evening., Disp: 100 Tablet, Rfl: 3    disulfiram (ANTABUSE) 250 MG Tab, Take 1 Tablet by mouth every day., Disp: 30 Tablet, Rfl: 1    FLUoxetine (PROZAC) 20 MG Cap, Take 1 Capsule by mouth every day., Disp: 90 Capsule, Rfl: 0    Metoprolol Succinate 25 MG Capsule ER 24 Hour Sprinkle, 1 cap(s) orally once a day for 30 day(s), Disp: , Rfl:     Potassium Gluconate 595 (99 K) MG Tab, 1 tablet po QD, Disp: , Rfl:     POTASSIUM PO, Take 200 mg by mouth., Disp: , Rfl:   ALLERGIES: No Known Allergies  SURGHX:   Past Surgical History:   Procedure Laterality Date    CRANIOTOMY BRAIN LAB  1967    APPENDECTOMY      OTHER NEUROLOGICAL SURG      removal of brain tumor    OTHER ORTHOPEDIC SURGERY      surgery to bilat knees     SOCHX:  reports that he has quit smoking. His smoking use included cigarettes. He has a 5 pack-year smoking history. His smokeless tobacco use includes chew. He reports current alcohol use of about 3.6 oz of alcohol per week. He reports that he does not currently use drugs after having used the following drugs: Marijuana.     Objective:   /76 (BP Location: Left arm, Patient Position: Sitting, BP Cuff Size: Adult)   Pulse 84   Temp 36.6 °C (97.9 °F) (Temporal)   Resp 18   Ht 1.905 m (6' 3\")   Wt 114 kg (251 lb)   SpO2 95%   BMI 31.37 kg/m²     Physical Exam  Constitutional:       General: He is not in acute distress.     Appearance: He is well-developed. He is not diaphoretic.   Pulmonary:      Effort: Pulmonary effort is normal.   Neurological:      Mental Status: He is alert.     Bilateral knees  Appearance - No bruising, erythema, or deformity appreciated  Palpation - +TTP along joint lines, no other significant TTP   ROM - FROM extension, flexion limited to 100 on right, 120 on left, " moderate crepitus present   Strength - 5/5 throughout  Neuro - Sensation equal and intact bilaterally  Special testing - No laxity or pain with varus/valgus stress, neg anterior drawer, neg posterior drawer, neg Lachman's, neg patellar apprehension test    Assessment/Plan:   Assessment    1. Bilateral chronic knee pain  - DX-KNEE COMPLETE 4+ RIGHT; Future  - DX-KNEE COMPLETE 4+ LEFT; Future  - Referral to Orthopedics    2. Primary osteoarthritis of left knee  - DX-KNEE COMPLETE 4+ LEFT; Future  - Referral to Orthopedics    3. Primary osteoarthritis of right knee  - DX-KNEE COMPLETE 4+ RIGHT; Future  - Referral to Orthopedics    Patient with at this point, he knee osteoarthritis and chronic knee pain.  Feels that he would like to discuss knee replacement surgery.  Discussed several other options including physical therapy, home exercise program, injections, and other conservative measures.  Patient has tried knee brace, p.o. meds, topical creams, and not getting adequate relief.  He does not want to pursue any injections.  Referral to orthopedics made and he will follow-up with Ortho to discuss possible total knee arthroplasty.

## 2024-03-18 PROBLEM — M17.9 OSTEOARTHRITIS, KNEE: Status: ACTIVE | Noted: 2024-03-18

## 2024-03-25 DIAGNOSIS — F41.9 ANXIETY: Chronic | ICD-10-CM

## 2024-03-25 DIAGNOSIS — I10 ESSENTIAL HYPERTENSION: Chronic | ICD-10-CM

## 2024-03-25 NOTE — TELEPHONE ENCOUNTER
Is the patient due for a refill? Yes    Was the patient seen the past year? No    Date of last office visit: 12/13/2023    Does the patient have an upcoming appointment?  No   If yes, When?     Provider to refill:[facility]    Does the patients insurance require a 100 day supply?  No

## 2024-03-26 RX ORDER — FLUOXETINE HYDROCHLORIDE 20 MG/1
20 CAPSULE ORAL DAILY
Qty: 90 CAPSULE | Refills: 0 | Status: SHIPPED | OUTPATIENT
Start: 2024-03-26

## 2024-03-26 RX ORDER — AMLODIPINE BESYLATE 10 MG/1
10 TABLET ORAL EVERY EVENING
Qty: 90 TABLET | Refills: 2 | Status: SHIPPED | OUTPATIENT
Start: 2024-03-26

## 2024-04-05 ENCOUNTER — TELEPHONE (OUTPATIENT)
Dept: URGENT CARE | Facility: CLINIC | Age: 59
End: 2024-04-05
Payer: COMMERCIAL

## 2024-04-05 NOTE — TELEPHONE ENCOUNTER
Patient LVM with us stating that he was trying to get his knees done through [facility] but was told that he needed to see PCP again.

## 2024-04-09 ENCOUNTER — TELEPHONE (OUTPATIENT)
Dept: MEDICAL GROUP | Facility: PHYSICIAN GROUP | Age: 59
End: 2024-04-09

## 2024-04-09 ENCOUNTER — APPOINTMENT (OUTPATIENT)
Dept: MEDICAL GROUP | Facility: PHYSICIAN GROUP | Age: 59
End: 2024-04-09
Payer: COMMERCIAL

## 2024-04-09 NOTE — PROGRESS NOTES
REASON FOR VISIT: Pre-Op Consultation  Consultation Requested by: ***  Procedure date and type: ***  Inherent cardiac risk of procedure: ***  {High = aortic & other major vascular surgery, peripheral artery surgery  Intermediate = carotid endarectomy, head & neck surgery, intraperitoneal & intrathoracic surgery, ortho, prostate surgery  Low = ambulatory surgery, endoscopic procedures, superficial procedures, cataract surgery, breast surgery}    History of condition for which surgery is planned:***    Current chronic conditions: does not have any pertinent problems on file. ***    Past medical history:  has a past medical history of Alcohol abuse (11/8/2021), Allergy, Anxiety (6/11/2019), Asthma, Bronchitis, Chronic hepatitis C without hepatic coma (HCC) (6/11/2019), Clotting disorder (HCC), Depression, Dyslipidemia (5/6/2019), HTN (hypertension), Hypertension, Irregular heart beat (5/6/2019), Pneumonia, and Sleep apnea.    He has no past medical history of Angina, Arthritis, Backpain, Blood transfusion without reported diagnosis, Cancer (HCC), CATARACT, Congestive heart failure (HCC), COPD, Diabetes, Dialysis, GERD (gastroesophageal reflux disease), Glaucoma, Heart murmur, Heart valve disease, IBD (inflammatory bowel disease), Indigestion, Jaundice, Migraine, Myocardial infarct (HCC), Other specified symptom associated with female genital organs, Pacemaker, Personal history of venous thrombosis and embolism, Renal disorder, Rheumatic fever, Seizure (HCC), Stroke (HCC), Unspecified disorder of thyroid, Unspecified hemorrhagic conditions, or Unspecified urinary incontinence.. Negative for: CAD, SBE, CVA, TIA, DVT, PE, bleeding requiring transfusion, intubation.***    Surgical and anesthetic history:  has a past surgical history that includes appendectomy; other neurological surg; other orthopedic surgery; and craniotomy brain lab (1967). Prior surgery without complication, bleeding, reaction to anesthetic, prolonged  recovery***    Habits:   Social History     Tobacco Use    Smoking status: Former     Current packs/day: 0.25     Average packs/day: 0.3 packs/day for 20.0 years (5.0 ttl pk-yrs)     Types: Cigarettes     Passive exposure: Never    Smokeless tobacco: Current     Types: Chew    Tobacco comments:     Dip coppenhagen for many years.   Vaping Use    Vaping Use: Former    Substances: THC   Substance Use Topics    Alcohol use: Yes     Alcohol/week: 3.6 oz     Types: 6 Cans of beer per week     Comment: daily, 2 drinks    Drug use: Not Currently     Types: Marijuana     Comment: Smoke    ***    Allergies: Patient has no known allergies. No known allergy to Anesthetic, or Latex.       Current medicines:   Current Outpatient Medications   Medication Sig Dispense Refill    Diclofenac Sodium (VOLTAREN PO) Take  by mouth.      FLUoxetine (PROZAC) 20 MG Cap Take 1 capsule by mouth once daily 90 Capsule 0    amLODIPine (NORVASC) 10 MG Tab TAKE 1 TABLET BY MOUTH ONCE DAILY IN THE EVENING 90 Tablet 2    ELIQUIS 5 MG Tab Take 5 mg by mouth 2 times a day.      metoprolol SR (TOPROL XL) 50 MG TABLET SR 24 HR Take 1 Tablet by mouth every evening. 100 Tablet 3    disulfiram (ANTABUSE) 250 MG Tab Take 1 Tablet by mouth every day. 30 Tablet 1    POTASSIUM PO Take 200 mg by mouth.       No current facility-administered medications for this visit.       Anticoagulant: ASA, NSAIDs ***   Herbals: Black Cohash, Echinacea, Garlic, Ignacia, Ginkgo Biloba, Ginseng, Kava, Juan’s Wort,  [place], Valerian ***              [place] Activity Status Index: https://www.mdcalc.com/duke-activity-status-index-dasi  METS: ***  {<4 = poor functional capacity, 4-10 = moderate functional capacity, >10 = excellent functional capacity}  ASA Class: ***  {1 - healthy, non-smoking, no or minimal EtOH use  2 - Mild diseases only without substantive functional limitations. Current smoker, social alcohol drinker, pregnancy, obesity (30<BMI<40), well-controlled  DM/HTN, mild lung disease  3 - Substantive functional limitations; one or more moderate to severe diseases. Poorly controlled DM or HTN, COPD, morbid obesity (BMI ?40), active hepatitis, alcohol dependence or abuse, implanted pacemaker, moderate reduction of ejection fraction, ESRD undergoing regularly scheduled dialysis, premature infant PCA<60 weeks, history (>3 months) of MI, CVA, TIA, or CAD/stents.  4 - Recent (<3 months) MI, CVA, TIA, or CAD/stents, ongoing cardiac ischemia or severe valve dysfunction, severe reduction of ejection fraction, sepsis, DIC, ARDS, or ESRD not undergoing regularly scheduled dialysis.  5 - Ruptured abdominal/thoracic aneurysm, massive trauma, intracranial bleed with mass effect, ischemic bowel in the face of significant cardiac pathology or multiple organ/system dysfunction}    ROS: negative for: CP, SOB, BOWLING, Orthopnea, wheezing, leg edema, polydipsia, polyuria, fevers, chills, sweats, cough, cold, congestion, abd pain, reflux, black or bloody stools, weight loss/gain. ***    PHYSICAL EXAMINATION:  VITAL SIGNS: There were no vitals taken for this visit. There is no height or weight on file to calculate BMI.  HEENT: EOMI, PERRL. Oropharynx pink, moist. Normal airway. Neck supple, no cervical lymphadenopathy.  LUNGS: CTAB good excursion.   HEART: RRR no murmur.  ABDOMEN: soft, nondistended, nontender normal BS. No HSM.  LOWER EXTREMITIES: warm and well perfused with no edema.  ***    Labs: See attached/per surgeon    Risk of complications using American College of Surgeons Surgical Risk Calculator: {https://riskcalculator.facs.org/RiskCalculator/}  Below average risk: ***    Average risk: ***    Above average risk: ***    IMPRESSION:  There were no encounter diagnoses.  Planned surgery:***   High risk medical conditions: negative for Cardiac, Pulmonary, Bleeding, Poor healing, Thrombosis, Debility ***    PLAN: ***  Chronic medical conditions: Stable and controlled. Continue current  medicines.   Avoid drugs that potentiate bleeding as advised by surgeon  Discontinue all herbal supplements 2 weeks prior to surgery.  Need for SBE prophylaxis: yes/no  This patient is considered Very Low/Low/Moderate/High risk for cardiopulmonary complications for this planned surgery by the Keith index    Keith Index for assessing perioperative cardiovascular risk [Circulation 1999;100:1047]:   (one point each for * high-risk surgery [ intrathoracic, suprainguinal vascular, intraperitoneal ],* Hx ischemic heart dz, * Hx CHF, *Hx TIA or CVA, *IDDM, *Serum Cr >2.0)   Interpretation of risk: (Complication = MI, Pulm edema, v-fib or cardiac arrest, complete heart block)  Very Low: 0 points = 0.4 % complication. Low: 1 point = 0.9 %, Moderate: 2 points = 6.6 %, High: 3+ points = 11%.

## 2024-04-09 NOTE — TELEPHONE ENCOUNTER
"Pt. Arrived in office 04/09/24 around 2:25pm, pt. Stated he was here to see , I informed the pt.  is OOO, pt. Stated he just needed to see his MA. I looked at our schedule and saw the pt. Was scheduled for a 1:40pm appt. PATRICE/ Azul Mckenna.    I informed the patient that he was scheduled with an actual physician at our office at 1:40pm and that unfortunately he is late and will need to reschedule.   The patient became argumentative about being late and why he needed to be seen.    The appt. Note stated \"Surgery Clearance\"  I confirmed with the pt. If that was the reasoning he was here, which he confirmed.    I informed the pt. That all patients need to be seen and evaluated for a surgery clearance with a physician.   The pt. Continued arguing yelling out \"Oh so you can just scam my insurance money.\"  I responded, No, so the physician can medically CLEAR you for surgery.    He then stated he will not reschedule and he will not come back. \"Remove me from the books. I will no longer be seeking care here. I will find someone who knows what the hell they're doing.\"  And stormed out shouting in the lobby and left.  "

## 2024-04-18 ENCOUNTER — HOSPITAL ENCOUNTER (OUTPATIENT)
Dept: LAB | Facility: MEDICAL CENTER | Age: 59
End: 2024-04-18
Attending: INTERNAL MEDICINE
Payer: COMMERCIAL

## 2024-04-18 ENCOUNTER — OFFICE VISIT (OUTPATIENT)
Dept: MEDICAL GROUP | Facility: PHYSICIAN GROUP | Age: 59
End: 2024-04-18
Payer: COMMERCIAL

## 2024-04-18 ENCOUNTER — OFFICE VISIT (OUTPATIENT)
Dept: CARDIOLOGY | Facility: MEDICAL CENTER | Age: 59
End: 2024-04-18
Payer: COMMERCIAL

## 2024-04-18 VITALS
BODY MASS INDEX: 28.97 KG/M2 | HEART RATE: 92 BPM | HEIGHT: 75 IN | TEMPERATURE: 99 F | DIASTOLIC BLOOD PRESSURE: 78 MMHG | OXYGEN SATURATION: 95 % | SYSTOLIC BLOOD PRESSURE: 112 MMHG | RESPIRATION RATE: 18 BRPM | WEIGHT: 233 LBS

## 2024-04-18 VITALS
HEART RATE: 88 BPM | OXYGEN SATURATION: 97 % | WEIGHT: 232 LBS | HEIGHT: 75 IN | BODY MASS INDEX: 28.85 KG/M2 | SYSTOLIC BLOOD PRESSURE: 106 MMHG | DIASTOLIC BLOOD PRESSURE: 70 MMHG

## 2024-04-18 DIAGNOSIS — Z01.818 PREOP EXAMINATION: ICD-10-CM

## 2024-04-18 DIAGNOSIS — I48.91 ATRIAL FIBRILLATION, UNSPECIFIED TYPE (HCC): ICD-10-CM

## 2024-04-18 DIAGNOSIS — E78.5 DYSLIPIDEMIA: Chronic | ICD-10-CM

## 2024-04-18 DIAGNOSIS — G47.30 SLEEP APNEA, UNSPECIFIED TYPE: Chronic | ICD-10-CM

## 2024-04-18 DIAGNOSIS — I10 ESSENTIAL HYPERTENSION: Chronic | ICD-10-CM

## 2024-04-18 DIAGNOSIS — Z12.11 COLON CANCER SCREENING: ICD-10-CM

## 2024-04-18 DIAGNOSIS — Z79.01 LONG TERM (CURRENT) USE OF ANTICOAGULANTS: ICD-10-CM

## 2024-04-18 DIAGNOSIS — F10.10 ALCOHOL ABUSE: Chronic | ICD-10-CM

## 2024-04-18 PROBLEM — E66.09 CLASS 1 OBESITY DUE TO EXCESS CALORIES WITH SERIOUS COMORBIDITY AND BODY MASS INDEX (BMI) OF 31.0 TO 31.9 IN ADULT: Status: RESOLVED | Noted: 2019-06-11 | Resolved: 2024-04-18

## 2024-04-18 PROBLEM — E66.811 CLASS 1 OBESITY DUE TO EXCESS CALORIES WITH SERIOUS COMORBIDITY AND BODY MASS INDEX (BMI) OF 31.0 TO 31.9 IN ADULT: Status: RESOLVED | Noted: 2019-06-11 | Resolved: 2024-04-18

## 2024-04-18 LAB
APTT PPP: 29.2 SEC (ref 24.7–36)
ERYTHROCYTE [DISTWIDTH] IN BLOOD BY AUTOMATED COUNT: 45.5 FL (ref 35.9–50)
HCT VFR BLD AUTO: 44.3 % (ref 42–52)
HGB BLD-MCNC: 15.4 G/DL (ref 14–18)
INR PPP: 1.05 (ref 0.87–1.13)
MCH RBC QN AUTO: 32.7 PG (ref 27–33)
MCHC RBC AUTO-ENTMCNC: 34.8 G/DL (ref 32.3–36.5)
MCV RBC AUTO: 94.1 FL (ref 81.4–97.8)
PLATELET # BLD AUTO: 243 K/UL (ref 164–446)
PMV BLD AUTO: 10.1 FL (ref 9–12.9)
PROTHROMBIN TIME: 13.8 SEC (ref 12–14.6)
RBC # BLD AUTO: 4.71 M/UL (ref 4.7–6.1)
WBC # BLD AUTO: 9.3 K/UL (ref 4.8–10.8)

## 2024-04-18 PROCEDURE — 3078F DIAST BP <80 MM HG: CPT | Performed by: INTERNAL MEDICINE

## 2024-04-18 PROCEDURE — 36415 COLL VENOUS BLD VENIPUNCTURE: CPT

## 2024-04-18 PROCEDURE — 3078F DIAST BP <80 MM HG: CPT

## 2024-04-18 PROCEDURE — 85610 PROTHROMBIN TIME: CPT

## 2024-04-18 PROCEDURE — 87086 URINE CULTURE/COLONY COUNT: CPT

## 2024-04-18 PROCEDURE — 85730 THROMBOPLASTIN TIME PARTIAL: CPT

## 2024-04-18 PROCEDURE — 93000 ELECTROCARDIOGRAM COMPLETE: CPT | Performed by: INTERNAL MEDICINE

## 2024-04-18 PROCEDURE — 87077 CULTURE AEROBIC IDENTIFY: CPT

## 2024-04-18 PROCEDURE — 99212 OFFICE O/P EST SF 10 MIN: CPT

## 2024-04-18 PROCEDURE — 85027 COMPLETE CBC AUTOMATED: CPT

## 2024-04-18 PROCEDURE — 3074F SYST BP LT 130 MM HG: CPT

## 2024-04-18 PROCEDURE — 99214 OFFICE O/P EST MOD 30 MIN: CPT

## 2024-04-18 PROCEDURE — 3074F SYST BP LT 130 MM HG: CPT | Performed by: INTERNAL MEDICINE

## 2024-04-18 PROCEDURE — 99215 OFFICE O/P EST HI 40 MIN: CPT | Performed by: INTERNAL MEDICINE

## 2024-04-18 RX ORDER — HYDROCODONE BITARTRATE AND ACETAMINOPHEN 5; 325 MG/1; MG/1
TABLET ORAL
COMMUNITY
Start: 2024-04-17 | End: 2024-04-18

## 2024-04-18 ASSESSMENT — FIBROSIS 4 INDEX
FIB4 SCORE: 1.24
FIB4 SCORE: 1.24

## 2024-04-18 ASSESSMENT — ENCOUNTER SYMPTOMS
PALPITATIONS: 1
CONSTITUTIONAL NEGATIVE: 1
GASTROINTESTINAL NEGATIVE: 1
MUSCULOSKELETAL NEGATIVE: 1
NERVOUS/ANXIOUS: 0
DEPRESSION: 0
PND: 0
SHORTNESS OF BREATH: 1
NEUROLOGICAL NEGATIVE: 1
ORTHOPNEA: 0
EYES NEGATIVE: 1

## 2024-04-18 ASSESSMENT — PATIENT HEALTH QUESTIONNAIRE - PHQ9: CLINICAL INTERPRETATION OF PHQ2 SCORE: 0

## 2024-04-18 NOTE — PROGRESS NOTES
"Chief Complaint   Patient presents with    Atrial Fibrillation    Hypertension    Dyslipidemia       Subjective     Sanjeev Charles is a 58 y.o. male who presents today for follow up. They have a history of atrial fibrillation (diagnosed in 2021), hypertension, obesity, sleep apnea,     Last seen by CIERA Mariscal on 12/23/2022, he was a new patient at that visit, transition to warfarin for cost, recommended to complete echocardiogram and follow-up in 6 months.  It does not appear that he ever got his echocardiogram and was lost to follow-up    Today 4/18/2024 feeling \"awesome.\" Although he just broke his clavicle. Does have palpitations, occasional shortness of breath with exertion    Activity: he is planning to start riding bikes. He does overhead sprinkler system instal which is very strenuous      Past Medical History:   Diagnosis Date    Alcohol abuse 11/8/2021    Allergy     Anxiety 6/11/2019    Asthma     Bronchitis     Chronic hepatitis C without hepatic coma (HCC) 6/11/2019    Clotting disorder (HCC)     1967 brain clot as child    Depression     Dyslipidemia 5/6/2019    HTN (hypertension)     Hypertension     Irregular heart beat 5/6/2019    Pneumonia     Sleep apnea      Past Surgical History:   Procedure Laterality Date    CRANIOTOMY BRAIN LAB  1967    APPENDECTOMY      OTHER NEUROLOGICAL SURG      removal of brain tumor    OTHER ORTHOPEDIC SURGERY      surgery to bilat knees     Family History   Problem Relation Age of Onset    Lung Disease Mother         COPD    Heart Disease Mother         CHF/Pacemaker    Cancer Mother         breast cancer    Heart Disease Father     Stroke Father     Cancer Sister         berast cancer     Social History     Socioeconomic History    Marital status:      Spouse name: Not on file    Number of children: Not on file    Years of education: Not on file    Highest education level: Not on file   Occupational History    Not on file   Tobacco Use    " Smoking status: Former     Current packs/day: 0.25     Average packs/day: 0.3 packs/day for 20.0 years (5.0 ttl pk-yrs)     Types: Cigarettes     Passive exposure: Never    Smokeless tobacco: Current     Types: Chew    Tobacco comments:     Dip coppenhagen for many years.   Vaping Use    Vaping Use: Former    Substances: THC   Substance and Sexual Activity    Alcohol use: Yes     Alcohol/week: 3.6 oz     Types: 6 Cans of beer per week     Comment: daily, 2 drinks    Drug use: Not Currently     Types: Marijuana     Comment: Smoke    Sexual activity: Yes     Partners: Female     Comment: LIves with wife and 2 kids. 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   Other Topics Concern    Not on file   Social History Narrative    Not on file     Social Determinants of Health     Financial Resource Strain: Not on file   Food Insecurity: Not on file   Transportation Needs: Not on file   Physical Activity: Not on file   Stress: Not on file   Social Connections: Not on file   Intimate Partner Violence: Not on file   Housing Stability: Not on file     No Known Allergies  Outpatient Encounter Medications as of 4/18/2024   Medication Sig Dispense Refill    Diclofenac Sodium (VOLTAREN PO) Take  by mouth.      FLUoxetine (PROZAC) 20 MG Cap Take 1 capsule by mouth once daily 90 Capsule 0    amLODIPine (NORVASC) 10 MG Tab TAKE 1 TABLET BY MOUTH ONCE DAILY IN THE EVENING 90 Tablet 2    ELIQUIS 5 MG Tab Take 5 mg by mouth 2 times a day. Pt takes 5mg once a day      metoprolol SR (TOPROL XL) 50 MG TABLET SR 24 HR Take 1 Tablet by mouth every evening. 100 Tablet 3    disulfiram (ANTABUSE) 250 MG Tab Take 1 Tablet by mouth every day. (Patient not taking: Reported on 4/18/2024) 30 Tablet 1    POTASSIUM PO Take 200 mg by mouth. (Patient not taking: Reported on 4/18/2024)       No facility-administered encounter medications on file as of 4/18/2024.     Review of Systems   Constitutional: Negative.    HENT:  "Negative.     Eyes: Negative.    Respiratory:  Positive for shortness of breath.    Cardiovascular:  Positive for palpitations. Negative for chest pain, orthopnea, leg swelling and PND.   Gastrointestinal: Negative.    Genitourinary: Negative.    Musculoskeletal: Negative.    Skin: Negative.    Neurological: Negative.    Endo/Heme/Allergies: Negative.    Psychiatric/Behavioral:  Negative for depression. The patient is not nervous/anxious.               Objective     /70 (BP Location: Left arm, Patient Position: Sitting, BP Cuff Size: Adult)   Pulse 88   Ht 1.905 m (6' 3\")   Wt 105 kg (232 lb)   SpO2 97%   BMI 29.00 kg/m²     Physical Exam  Constitutional:       Appearance: Normal appearance.   HENT:      Head: Normocephalic.   Neck:      Vascular: No JVD.   Cardiovascular:      Rate and Rhythm: Normal rate and regular rhythm.      Pulses: Normal pulses.      Heart sounds: Normal heart sounds. No murmur heard.     No friction rub.   Pulmonary:      Effort: Pulmonary effort is normal.      Breath sounds: Normal breath sounds.   Abdominal:      Palpations: Abdomen is soft.   Musculoskeletal:         General: Normal range of motion.      Right lower leg: No edema.      Left lower leg: No edema.   Skin:     General: Skin is warm and dry.   Neurological:      General: No focal deficit present.      Mental Status: He is alert and oriented to person, place, and time.   Psychiatric:         Mood and Affect: Mood normal.         Behavior: Behavior normal.                Assessment & Plan     1. Atrial fibrillation, unspecified type ([facility])  Holter Monitor Study    REFERRAL TO CARDIOLOGY      2. Dyslipidemia        3. Essential hypertension        4. Long term (current) use of anticoagulants        5. Sleep apnea, unspecified type  Referral to Pulmonary and Sleep Medicine          Medical Decision Making: Today's Assessment/Status/Plan:        Atrial fibrillation  -He has been having more shortness of breath lately " and dyspnea on exertion, also palpitations  -[id number] 1  -He has been on Eliquis 5 mg twice daily, tolerating well without significant bruising/bleeding  -Metoprolol 50 mg daily  -I referred him to EP for consideration of ablation    Hypertension  - good control  - continue current regimen  - goal < 130/80     Sleep apnea  - not using CPAP  -He is interested in a sleep medicine referral which I have placed  -He is on to discuss other options for treating sleep apnea besides CPAP machines    Follow-up with CIERA Robin in 4 months, follow-up with EP at next available    This note was dictated using Dragon speech recognition software.

## 2024-04-18 NOTE — ASSESSMENT & PLAN NOTE
This is a chronic condition.  The patient is taking amlodipine 10 Mg daily and metoprolol 50 Mg daily.  Patient tolerating medication well.

## 2024-04-18 NOTE — ASSESSMENT & PLAN NOTE
This is a chronic condition.  The patient is presently taking metoprolol 50 Mg daily and Eliquis 5 mg twice daily.  Patient tolerating medication well.  No history of bleeding reported.

## 2024-04-18 NOTE — PROGRESS NOTES
PREOPERATIVE EVALUATION     -Name of surgeon requesting this evaluation: Dr Almonte /[facility]  -Date of surgery/procedure: tbd  -Planned surgery/procedure: R TKA    Pt's medical history notable for:     Atrial fibrillation (HCC)  This is a chronic condition.  The patient is presently taking metoprolol 50 Mg daily and Eliquis 5 mg twice daily.  Patient tolerating medication well.  No history of bleeding reported.    Dyslipidemia  Chronic condition.  The patient is currently on diet therapy.    Essential hypertension  This is a chronic condition.  The patient is taking amlodipine 10 Mg daily and metoprolol 50 Mg daily.  Patient tolerating medication well.    Alcohol use  Chronic cond  Pt is taking antabuse  Pt stated he still drinks about once a week  Strongly advised to quit EOTH        REVISED CARDIAC RISK INDEX [RCRI] assessment:      Yes No   [] [x]   High risk surgery [vascular surgery, any open intraperitoneal surgery, any intrathoracic surgery] ?  [] [x]   Hx of ischemic heart disease ?   [] [x]   Hx of heart failure ?   [] [x]   Hx of CVA ?   [] [x]   Hx of diabetes requiring insulin treatment ?   [] [x]   Preop serum Cr > 2 mg /dL?      FUNCTIONAL CAPACITY assessment: Per pt's report patient       [x]   Can take care of self, such as eat, dress, or use the toilet (1 MET)  [x]   Can walk up a flight of steps or hill or walk on level ground at 3 to 4 mph (4 METS)      Allergies: Patient has no known allergies.    Current Outpatient Medications Ordered in Epic   Medication Sig Dispense Refill    Diclofenac Sodium (VOLTAREN PO) Take  by mouth.      FLUoxetine (PROZAC) 20 MG Cap Take 1 capsule by mouth once daily 90 Capsule 0    amLODIPine (NORVASC) 10 MG Tab TAKE 1 TABLET BY MOUTH ONCE DAILY IN THE EVENING 90 Tablet 2    ELIQUIS 5 MG Tab Take 5 mg by mouth 2 times a day.      metoprolol SR (TOPROL XL) 50 MG TABLET SR 24 HR Take 1 Tablet by mouth every evening. 100 Tablet 3    disulfiram (ANTABUSE) 250 MG Tab Take 1  Tablet by mouth every day. 30 Tablet 1    POTASSIUM PO Take 200 mg by mouth.       No current Epic-ordered facility-administered medications on file.       Past Medical History:   Diagnosis Date    Alcohol abuse 11/8/2021    Allergy     Anxiety 6/11/2019    Asthma     Bronchitis     Chronic hepatitis C without hepatic coma (HCC) 6/11/2019    Clotting disorder (HCC)     1967 brain clot as child    Depression     Dyslipidemia 5/6/2019    HTN (hypertension)     Hypertension     Irregular heart beat 5/6/2019    Pneumonia     Sleep apnea        Past Surgical History:   Procedure Laterality Date    CRANIOTOMY BRAIN LAB  1967    APPENDECTOMY      OTHER NEUROLOGICAL SURG      removal of brain tumor    OTHER ORTHOPEDIC SURGERY      surgery to bilat knees       Family History   Problem Relation Age of Onset    Lung Disease Mother         COPD    Heart Disease Mother         CHF/Pacemaker    Cancer Mother         breast cancer    Heart Disease Father     Stroke Father     Cancer Sister         berast cancer       Social History     Tobacco Use   Smoking Status Former    Current packs/day: 0.25    Average packs/day: 0.3 packs/day for 20.0 years (5.0 ttl pk-yrs)    Types: Cigarettes    Passive exposure: Never   Smokeless Tobacco Current    Types: Chew   Tobacco Comments    Dip coppenhagen for many years.       Social History     Substance and Sexual Activity   Alcohol Use Yes    Alcohol/week: 3.6 oz    Types: 6 Cans of beer per week    Comment: daily, 2 drinks       REVIEW OF SYSTEMS:     Constitutional:  no fever / chills   CV:  no chest pain, no palpitations  Pulmonary: no SOB, no cough    GI: no nausea / vomiting  :  no dysuria, no hematuria       PHYSICAL EXAM:  Vitals:    04/18/24 1023   BP: 112/78   Pulse: 92   Resp: 18   Temp: 37.2 °C (99 °F)   SpO2: 95%     Body mass index is 29.12 kg/m².     Constitutional: alert in no apparent distress.  Neck: supple, no JVD  CV: heart RRR  Resp: lungs no wheezing   GI: BS present.    Neuro: CN 2-12 grossly intact      LABORATORY DATA:   EKG: I independently interpreted the patient's ekg :   Atrial fibrillation with slow ventricular response.  Heart rate 58.  Left anterior fascicular block.  QRS changes may be due to LVH but cannot rule out anteroseptal infarct  Abnormal EKG. discussed with the patient.        ASSESSMENT / MEDICAL DECISION MAKING:  - This is a 58 y.o. pt presents today for preop evaluation:     Yes No    [] [x]   is this surgery is an emergency?       [] [x]   is pt with acute coronary syndrome now?     - Perioperative risk factors[s] [Revised Cardiac Risk Index] as noted above:   0    [Keith index:  Rate of cardiac death, nonfatal MI, and non fatal cardiac arrest: No risk factor = 0.4%,  1 risk = 1%,  2 risks = 2-4%,  3+ risks = 5.4% ]   [Keith index:  Rate of MI, pulmonary edema, ventricular fibrillation, primary cardiac arrest, and complete heart block: No risk factor = 0.5%,  1 risk = 1.3%, 2 risks = 3.6%,  3+ risks = 9.1%]    - The pt's estimated Functional Capacity :    4METS approximately.       - Pcp's recommendation:      [x]   Referred pt to Cardiology for further evaluation.      -Risk level:   []   Low  [x]   Intermediate  []   High     -Comments: Advised pt to avoid aspirin 10d prior to the procedure.    ----------------------------------------------------------------------------------------------------    1. Preop examination  - CBC WITHOUT DIFFERENTIAL; Future  - PT AND PTT  - URINE CULTURE(NEW); Future  - REFERRAL TO CARDIOLOGY    2. Atrial fibrillation, unspecified type (HCC)  Chronic condition.  Stable.  patient to obtain cardiology clearance before scheduling the surgery.  - REFERRAL TO CARDIOLOGY    3. Dyslipidemia  Chronic condition.  Recommend low-fat low-cholesterol diet  Lab test next visit.    4. Essential hypertension  Chronic stable.  Continue amlodipine 10 mg daily   metoprolol 50 mg daily   BP well-controlled today      5. Alcohol use  Advised the  patient to discontinue ETOH.  He will continue taking Antabuse to 50 mg daily.            Total time:  46   minutes -    That includes chart review before the visit, the actual patient visit, and time spent on documentation after the visit.  Chart review/prep, review of other providers' records, imaging/lab review, face-to-face time for history/examination, ordering, prescribing,  review of results/meds/ treatment plan with patient, documentation in EMR, care coordination (as needed)     Please note that this dictation was created using voice recognition software. I have made every reasonable attempt to correct obvious errors, but I expect that there are errors of grammar and possibly content that I did not discover before finalizing the note.

## 2024-04-18 NOTE — ASSESSMENT & PLAN NOTE
Chronic cond  Pt is taking antabuse  Pt stated he still drinks about once a week  Strongly advised to quit

## 2024-04-21 LAB
BACTERIA UR CULT: NORMAL
SIGNIFICANT IND 70042: NORMAL
SITE SITE: NORMAL
SOURCE SOURCE: NORMAL

## 2024-04-23 ENCOUNTER — TELEPHONE (OUTPATIENT)
Dept: CARDIOLOGY | Facility: MEDICAL CENTER | Age: 59
End: 2024-04-23

## 2024-04-23 ENCOUNTER — APPOINTMENT (OUTPATIENT)
Dept: CARDIOLOGY | Facility: MEDICAL CENTER | Age: 59
End: 2024-04-23
Payer: COMMERCIAL

## 2024-04-23 NOTE — TELEPHONE ENCOUNTER
Caller: Sanjeev Charles    Topic/issue: Patient does not understand why  ordered him an event monitor and would like to discuss. Please call back ASAP as appointment is tomorrow, 04/24/2024.    Callback Number: [phone]    Thank you,  Nida MILLS

## 2024-04-23 NOTE — TELEPHONE ENCOUNTER
Phone Number Called: [phone]    Call outcome: Spoke to patient regarding message below.    Message: Called to inform patient on why heart monitor was ordered by . All questions answered at this time. Advised to call back with any further questions or concerns.

## 2024-04-24 ENCOUNTER — NON-PROVIDER VISIT (OUTPATIENT)
Dept: CARDIOLOGY | Facility: MEDICAL CENTER | Age: 59
End: 2024-04-24
Attending: STUDENT IN AN ORGANIZED HEALTH CARE EDUCATION/TRAINING PROGRAM
Payer: COMMERCIAL

## 2024-04-24 ENCOUNTER — APPOINTMENT (OUTPATIENT)
Dept: CARDIOLOGY | Facility: MEDICAL CENTER | Age: 59
End: 2024-04-24
Payer: COMMERCIAL

## 2024-04-24 DIAGNOSIS — I48.91 ATRIAL FIBRILLATION, UNSPECIFIED TYPE (HCC): ICD-10-CM

## 2024-04-24 PROCEDURE — 93246 EXT ECG>7D<15D RECORDING: CPT

## 2024-04-24 NOTE — PROGRESS NOTES
Patient enrolled in the 14 day o Holter monitoring program per CIERA Woods.  >Office hook-up, serial # [id number].  >Currently pending EOS.

## 2024-04-25 ENCOUNTER — TELEPHONE (OUTPATIENT)
Dept: CARDIOLOGY | Facility: MEDICAL CENTER | Age: 59
End: 2024-04-25
Payer: COMMERCIAL

## 2024-04-25 NOTE — TELEPHONE ENCOUNTER
Last OV: 4/18/24  Proposed Surgery: Arthroplasty, Knee Total - Right  Surgery Date: TBD  Requesting Office Name: [facility]  Fax Number: [phone]  Preference of Location (default is surgery center unless specified by Cardiologist or [facility]ISAEL)  Prior Clearance Addressed: No      Anticoags/Antiplatelets: Apixaban   Anticoags/Antiplatelet managed by Cardiology? YES    Outstanding Cardiac Imaging : No  Stent, Cardiac Devices, or Catheterization: No  Ablation, TAVR/Valve (including open heart), Cardioversion: No  Recent Cardiac Hospitalization: No            When: N/A  History (cardiac history):   Past Medical History:   Diagnosis Date    Alcohol abuse 11/8/2021    Allergy     Anxiety 6/11/2019    Asthma     Bronchitis     Chronic hepatitis C without hepatic coma (HCC) 6/11/2019    Clotting disorder (HCC)     1967 brain clot as child    Depression     Dyslipidemia 5/6/2019    HTN (hypertension)     Hypertension     Irregular heart beat 5/6/2019    Pneumonia     Sleep apnea              Surgical Clearance Letter Sent: YES   **Scan clearance request letter into [facility].**

## 2024-04-25 NOTE — LETTER
PROCEDURE/SURGERY CLEARANCE FORM    Date: 4/25/2024   Patient Name: Sanjeev Charles    Dear Surgeon or Proceduralist,      Thank you for your request for cardiac stratification of our mutual patient Sanjeev Charles 1965. We have reviewed their [facility] records; and to the best of our understanding this patient has not had stenting, ablation, cardiothoracic surgery or hospitalization for cardiovascular reasons in the past 6 months.  Sanjeev Charles has been seen within the past 18 months and is considered to have non-modifiable cardiac risk for this low-risk procedure/surgery. They may proceed from a cardiovascular standpoint and may hold their antiplatelet/anticoagulation as briefly as possible. Please have patient resume this medication when hemodynamically stable to do so.     Aspirin or Prasugrel   - hold 7 days prior to procedure/surgery, resume when hemodynamically stable      Clopidrogrel or Ticagrelor  - hold 7 days for all neurological procedures, hold 5 days prior to all other procedure/surgery,  resume when hemodynamically stable     Warfarin - hold 7 days for all neurological procedures, hold 5 days prior to all other procedure/surgery and coordinate with [facility] ([phone]) INR testing and dose management.      Pradaxa/Xarelto/Eliquis/Savesya - hold 1 day prior to procedure for low bleeding risk procedure, 2 days for high bleeding risk procedure, or consider holding 3 days or longer for patients with reduced kidney function (CrCl <30mL/min) or spinal/cranial surgeries/procedures.      If they have a mechanical heart valve, please coordinate with [facility] ([phone]) the proper management of their anticoagulant in the periprocedural or perioperative period.      Some patients have higher risk for cardiovascular complications or holding medication. If our patient has had prior complications of holding antiplatelet or anticoagulants in the  past and we have seen them after these events, we have addressed these concerns with the patient. They are at an unknown degree of increased risk for recurrent complication.  You may hold anticoagulation/antiplatelets for the procedure or surgery if the benefits of the procedure or surgery outweigh this nonmodifiable risk.      If Sanjeev Charles 1965 has new symptoms of heart failure decompensation, unstable arrythmia, or angina please reach out and we will assess the patient.      If you have other patient-specific concerns, please feel free to reach out to the patient's cardiologist directly at [phone].     Thank you,       [facility], formerly [facility]

## 2024-04-26 ENCOUNTER — TELEPHONE (OUTPATIENT)
Dept: HEALTH INFORMATION MANAGEMENT | Facility: OTHER | Age: 59
End: 2024-04-26
Payer: COMMERCIAL

## 2024-04-29 ENCOUNTER — TELEPHONE (OUTPATIENT)
Dept: SLEEP MEDICINE | Facility: MEDICAL CENTER | Age: 59
End: 2024-04-29
Payer: COMMERCIAL

## 2024-04-29 NOTE — TELEPHONE ENCOUNTER
04-29-24  NO SHOW  Date of No Show: 04-26-24  Provider: Melinda  Reason For Visit: NP/ Melinda  Outcome of call:      No call made, C.S. already reached out to r/s Appt     Reason Missed: N/A  ACM

## 2024-05-02 PROBLEM — M17.11 PRIMARY OSTEOARTHRITIS OF RIGHT KNEE: Status: ACTIVE | Noted: 2024-03-18

## 2024-05-06 ENCOUNTER — APPOINTMENT (OUTPATIENT)
Dept: ADMISSIONS | Facility: MEDICAL CENTER | Age: 59
End: 2024-05-06
Attending: ORTHOPAEDIC SURGERY
Payer: COMMERCIAL

## 2024-05-08 ENCOUNTER — PRE-ADMISSION TESTING (OUTPATIENT)
Dept: ADMISSIONS | Facility: MEDICAL CENTER | Age: 59
End: 2024-05-08
Attending: ORTHOPAEDIC SURGERY
Payer: COMMERCIAL

## 2024-05-08 ENCOUNTER — TELEPHONE (OUTPATIENT)
Dept: HEALTH INFORMATION MANAGEMENT | Facility: OTHER | Age: 59
End: 2024-05-08

## 2024-05-08 DIAGNOSIS — Z01.812 PRE-OPERATIVE LABORATORY EXAMINATION: ICD-10-CM

## 2024-05-09 ENCOUNTER — APPOINTMENT (OUTPATIENT)
Dept: ADMISSIONS | Facility: MEDICAL CENTER | Age: 59
End: 2024-05-09
Attending: ORTHOPAEDIC SURGERY
Payer: COMMERCIAL

## 2024-05-09 DIAGNOSIS — Z01.812 PRE-OPERATIVE LABORATORY EXAMINATION: ICD-10-CM

## 2024-05-09 LAB
EST. AVERAGE GLUCOSE BLD GHB EST-MCNC: 108 MG/DL
HBA1C MFR BLD: 5.4 % (ref 4–5.6)
SCCMEC + MECA PNL NOSE NAA+PROBE: NEGATIVE
SCCMEC + MECA PNL NOSE NAA+PROBE: POSITIVE

## 2024-05-09 PROCEDURE — 87640 STAPH A DNA AMP PROBE: CPT

## 2024-05-09 PROCEDURE — 87641 MR-STAPH DNA AMP PROBE: CPT

## 2024-05-09 PROCEDURE — 36415 COLL VENOUS BLD VENIPUNCTURE: CPT

## 2024-05-09 PROCEDURE — 83036 HEMOGLOBIN GLYCOSYLATED A1C: CPT

## 2024-05-09 ASSESSMENT — FIBROSIS 4 INDEX: FIB4 SCORE: 1.25

## 2024-05-09 NOTE — PREPROCEDURE INSTRUCTIONS
Pre-admit telephone appointment completed. Reviewed the Preparing for your procedure handout with patient over the phone. Patient instructed per pharmacy guidelines regarding taking, holding, or contacting provider for instructions on regularly prescribed medications before surgery. Instructed to take the following medications the day of surgery with a sip of water per pharmacy medication guidelines: Metoprolol, Prozac.    Denies anesthesia complications

## 2024-05-09 NOTE — DISCHARGE PLANNING
DISCHARGE PLANNING NOTE - TOTAL JOINT    Procedure: Procedure(s):  ROBOTIC RIGHT TOTAL KNEE ARTHROPLASTY  Procedure Date: 5/28/2024  Insurance: Payor: KHUSHI / Plan: [employer]HI BCBS / Product Type: *No Product type* /    Equipment currently available at home?   may have crutches and may borrow a walker  from family  Steps into the home? 2  Steps within the home? 0  Toilet height? ADA  Type of shower? tub-shower  Who will be with you during your recovery? spouse  Is Outpatient Physical Therapy set up after surgery? No   Did you take the Total Joint Class and where? No , [facility] booklet and link to TJ class given to pt  Planning same day discharge?Yes     Plan: Met with patient for preadmit appointment.  Home safety checklist, equipment list, shower instructions and MRSA swab information reviewed and given to patient along with shower kit. MRSA swab completed prior to seeing CM. Recommend tub shower bench. Discharge criteria and possible need for overnight stay discussed with patient. Anticipate discharge home with no barriers.

## 2024-05-14 ENCOUNTER — HOSPITAL ENCOUNTER (OUTPATIENT)
Dept: RADIOLOGY | Facility: MEDICAL CENTER | Age: 59
End: 2024-05-14
Attending: ORTHOPAEDIC SURGERY
Payer: COMMERCIAL

## 2024-05-14 DIAGNOSIS — M17.11 PRIMARY OSTEOARTHRITIS OF RIGHT KNEE: ICD-10-CM

## 2024-05-24 RX ORDER — TRAMADOL HYDROCHLORIDE 50 MG/1
50-75 TABLET ORAL EVERY 6 HOURS PRN
Qty: 35 TABLET | Refills: 0 | Status: SHIPPED | OUTPATIENT
Start: 2024-05-24 | End: 2024-05-31

## 2024-05-24 RX ORDER — OXYCODONE HYDROCHLORIDE 5 MG/1
5-7.5 TABLET ORAL EVERY 6 HOURS PRN
Qty: 42 TABLET | Refills: 0 | Status: SHIPPED | OUTPATIENT
Start: 2024-05-24 | End: 2024-05-31

## 2024-05-24 RX ORDER — ACETAMINOPHEN 500 MG
1000 TABLET ORAL 3 TIMES DAILY PRN
Qty: 90 TABLET | Refills: 0
Start: 2024-05-24 | End: 2024-06-23

## 2024-05-24 RX ORDER — DOCUSATE SODIUM 100 MG/1
100 CAPSULE, LIQUID FILLED ORAL 2 TIMES DAILY
Qty: 60 CAPSULE | Refills: 0 | Status: SHIPPED | OUTPATIENT
Start: 2024-05-24 | End: 2024-06-23

## 2024-05-24 RX ORDER — ASPIRIN 81 MG/1
81 TABLET, CHEWABLE ORAL 2 TIMES DAILY
Qty: 14 TABLET | Refills: 0
Start: 2024-05-24 | End: 2024-05-31

## 2024-05-25 NOTE — DISCHARGE INSTRUCTIONS
Patient will be discharged home and follow up with Dr. Almonte in 2 weeks, for which the patient already has scheduled.    You may call or text Dr. Almonte' medical assistant during business hours at [phone].  You may call the emergency [facility] line during nights/weekends/holidays if needed at [phone].    Instructions:  -Leave the bandage in place until your followup appointment in 2 weeks.  -May shower with bandage in place, if bandage becomes soaked underneath please remove and call the office immediately.  -No baths/tubs/pools/submersion of the wound for now.  -Call if there is significant drainage beneath the bandage    -Put as much weight as comfortable on the operative leg.  Use a walker to assist with balance to avoid any falls.  -It is important to start moving and stretching right away, otherwise the joint can stiffen.    -Take your blood clot prevention medication    -For the first week, take aspirin 81mg twice per day (No eliquis the first week)   -Post op day#8 stop aspirin and resume Eliquis as usual    -Use ice frequently to help with pain and swelling control    -Pain management:  Standard pain regimen should be:  Ice as much as possible.  Apply the ice anywhere you feel pain.  Tylenol (acetaminophen) - 1000mg every 8 hours. Take this automatically on a scheduled basis.  Tramadol - 1-2 capsules every 6 hours. Take this automatically on a scheduled basis until no longer needed for pain.  Oxycodone - 1-2 tablets every 6 hours only if needed.  You would take the oxycodone 3 hours after the Tramadol, such that you are taking one or the other every 3 hours        -Try to limit the amount of oxycodone since it tends to cause constipation, drowsiness, etc.  But if you need it, take it.        -100mg of Colace (docusate) will be prescribed. Take this twice a day while still taking Tramadol or Oxycodone. Can discontinue after opiates are no longer being taken.  If bowel movement has not occurred in 48  hours please add in a laxative called Senna (over the counter) twice daily in conjunction with the colace. If no bowel movement is produced 48 hours after adding in Senna please start Milk of Magnesia (over the counter).     AGGRESSIVE RANGE OF MOTION.  Do a quad set a million times per day.  Have a family member push down on the thigh and shin every 20 minutes with 2-3 pillows under the heel to help stretch the knee straight.  Flex the knee as far as possible every 20 minutes.

## 2024-05-28 ENCOUNTER — HOSPITAL ENCOUNTER (OUTPATIENT)
Facility: MEDICAL CENTER | Age: 59
End: 2024-05-28
Attending: ORTHOPAEDIC SURGERY | Admitting: ORTHOPAEDIC SURGERY
Payer: COMMERCIAL

## 2024-05-28 VITALS
WEIGHT: 239.97 LBS | BODY MASS INDEX: 29.84 KG/M2 | RESPIRATION RATE: 16 BRPM | DIASTOLIC BLOOD PRESSURE: 93 MMHG | HEART RATE: 87 BPM | HEIGHT: 75 IN | SYSTOLIC BLOOD PRESSURE: 141 MMHG | OXYGEN SATURATION: 98 % | TEMPERATURE: 98.4 F

## 2024-05-28 DIAGNOSIS — G89.18 POST-OPERATIVE PAIN: ICD-10-CM

## 2024-05-28 DIAGNOSIS — M17.11 PRIMARY OSTEOARTHRITIS OF RIGHT KNEE: ICD-10-CM

## 2024-05-28 RX ORDER — ACETAMINOPHEN 500 MG
1000 TABLET ORAL EVERY 6 HOURS PRN
Status: CANCELLED | OUTPATIENT
Start: 2024-06-02

## 2024-05-28 RX ORDER — SCOLOPAMINE TRANSDERMAL SYSTEM 1 MG/1
1 PATCH, EXTENDED RELEASE TRANSDERMAL
Status: CANCELLED | OUTPATIENT
Start: 2024-05-28

## 2024-05-28 RX ORDER — OMEPRAZOLE 20 MG/1
20 CAPSULE, DELAYED RELEASE ORAL 2 TIMES DAILY PRN
Status: CANCELLED | OUTPATIENT
Start: 2024-05-28 | End: 2024-06-07

## 2024-05-28 RX ORDER — AMLODIPINE BESYLATE 5 MG/1
10 TABLET ORAL EVERY EVENING
Status: CANCELLED | OUTPATIENT
Start: 2024-05-28

## 2024-05-28 RX ORDER — FLUOXETINE HYDROCHLORIDE 20 MG/1
20 CAPSULE ORAL DAILY
Status: CANCELLED | OUTPATIENT
Start: 2024-05-29

## 2024-05-28 RX ORDER — ACETAMINOPHEN 500 MG
1000 TABLET ORAL ONCE
Status: DISCONTINUED | OUTPATIENT
Start: 2024-05-28 | End: 2024-05-28 | Stop reason: HOSPADM

## 2024-05-28 RX ORDER — DOCUSATE SODIUM 100 MG/1
100 CAPSULE, LIQUID FILLED ORAL 2 TIMES DAILY
Status: CANCELLED | OUTPATIENT
Start: 2024-05-28

## 2024-05-28 RX ORDER — ASPIRIN 81 MG/1
81 TABLET ORAL 2 TIMES DAILY
Status: CANCELLED | OUTPATIENT
Start: 2024-05-28

## 2024-05-28 RX ORDER — OXYCODONE HYDROCHLORIDE 10 MG/1
10 TABLET ORAL
Status: CANCELLED | OUTPATIENT
Start: 2024-05-28

## 2024-05-28 RX ORDER — HYDROMORPHONE HYDROCHLORIDE 1 MG/ML
0.5 INJECTION, SOLUTION INTRAMUSCULAR; INTRAVENOUS; SUBCUTANEOUS
Status: CANCELLED | OUTPATIENT
Start: 2024-05-28

## 2024-05-28 RX ORDER — ONDANSETRON 2 MG/ML
4 INJECTION INTRAMUSCULAR; INTRAVENOUS EVERY 4 HOURS PRN
Status: CANCELLED | OUTPATIENT
Start: 2024-05-28

## 2024-05-28 RX ORDER — OXYCODONE HYDROCHLORIDE 5 MG/1
5 TABLET ORAL
Status: CANCELLED | OUTPATIENT
Start: 2024-05-28

## 2024-05-28 RX ORDER — DIPHENHYDRAMINE HYDROCHLORIDE 50 MG/ML
25 INJECTION INTRAMUSCULAR; INTRAVENOUS EVERY 6 HOURS PRN
Status: CANCELLED | OUTPATIENT
Start: 2024-05-28

## 2024-05-28 RX ORDER — AMOXICILLIN 250 MG
1 CAPSULE ORAL
Status: CANCELLED | OUTPATIENT
Start: 2024-05-28

## 2024-05-28 RX ORDER — BISACODYL 10 MG
10 SUPPOSITORY, RECTAL RECTAL
Status: CANCELLED | OUTPATIENT
Start: 2024-05-28

## 2024-05-28 RX ORDER — CEFAZOLIN SODIUM 1 G/3ML
2 INJECTION, POWDER, FOR SOLUTION INTRAMUSCULAR; INTRAVENOUS ONCE
Status: DISCONTINUED | OUTPATIENT
Start: 2024-05-28 | End: 2024-05-28 | Stop reason: HOSPADM

## 2024-05-28 RX ORDER — CELECOXIB 200 MG/1
200 CAPSULE ORAL ONCE
Status: DISCONTINUED | OUTPATIENT
Start: 2024-05-28 | End: 2024-05-28 | Stop reason: HOSPADM

## 2024-05-28 RX ORDER — DIPHENHYDRAMINE HCL 25 MG
25 TABLET ORAL NIGHTLY PRN
Status: CANCELLED | OUTPATIENT
Start: 2024-05-29

## 2024-05-28 RX ORDER — DIPHENHYDRAMINE HCL 25 MG
25 TABLET ORAL EVERY 6 HOURS PRN
Status: CANCELLED | OUTPATIENT
Start: 2024-05-28

## 2024-05-28 RX ORDER — SODIUM CHLORIDE, SODIUM LACTATE, POTASSIUM CHLORIDE, CALCIUM CHLORIDE 600; 310; 30; 20 MG/100ML; MG/100ML; MG/100ML; MG/100ML
INJECTION, SOLUTION INTRAVENOUS CONTINUOUS
Status: DISCONTINUED | OUTPATIENT
Start: 2024-05-28 | End: 2024-05-28 | Stop reason: HOSPADM

## 2024-05-28 RX ORDER — HALOPERIDOL 5 MG/ML
1 INJECTION INTRAMUSCULAR EVERY 6 HOURS PRN
Status: CANCELLED | OUTPATIENT
Start: 2024-05-28

## 2024-05-28 RX ORDER — AMOXICILLIN 250 MG
1 CAPSULE ORAL NIGHTLY
Status: CANCELLED | OUTPATIENT
Start: 2024-05-28

## 2024-05-28 RX ORDER — IBUPROFEN 400 MG/1
800 TABLET ORAL 3 TIMES DAILY PRN
Status: CANCELLED | OUTPATIENT
Start: 2024-05-31

## 2024-05-28 RX ORDER — TRAMADOL HYDROCHLORIDE 50 MG/1
50 TABLET ORAL EVERY 4 HOURS PRN
Status: CANCELLED | OUTPATIENT
Start: 2024-05-28

## 2024-05-28 RX ORDER — METOPROLOL SUCCINATE 25 MG/1
50 TABLET, EXTENDED RELEASE ORAL DAILY
Status: CANCELLED | OUTPATIENT
Start: 2024-05-28

## 2024-05-28 RX ORDER — GABAPENTIN 300 MG/1
300 CAPSULE ORAL ONCE
Status: DISCONTINUED | OUTPATIENT
Start: 2024-05-28 | End: 2024-05-28 | Stop reason: HOSPADM

## 2024-05-28 RX ORDER — DEXAMETHASONE SODIUM PHOSPHATE 4 MG/ML
4 INJECTION, SOLUTION INTRA-ARTICULAR; INTRALESIONAL; INTRAMUSCULAR; INTRAVENOUS; SOFT TISSUE
Status: CANCELLED | OUTPATIENT
Start: 2024-05-28

## 2024-05-28 RX ORDER — ENEMA 19; 7 G/133ML; G/133ML
1 ENEMA RECTAL
Status: CANCELLED | OUTPATIENT
Start: 2024-05-28

## 2024-05-28 RX ORDER — KETOROLAC TROMETHAMINE 15 MG/ML
15 INJECTION, SOLUTION INTRAMUSCULAR; INTRAVENOUS EVERY 6 HOURS
Status: CANCELLED | OUTPATIENT
Start: 2024-05-28 | End: 2024-05-31

## 2024-05-28 RX ORDER — ACETAMINOPHEN 500 MG
1000 TABLET ORAL EVERY 6 HOURS
Status: CANCELLED | OUTPATIENT
Start: 2024-05-28 | End: 2024-06-02

## 2024-05-28 RX ORDER — POLYETHYLENE GLYCOL 3350 17 G/17G
1 POWDER, FOR SOLUTION ORAL 2 TIMES DAILY PRN
Status: CANCELLED | OUTPATIENT
Start: 2024-05-28

## 2024-05-28 ASSESSMENT — FIBROSIS 4 INDEX: FIB4 SCORE: 1.25

## 2024-05-28 NOTE — PROGRESS NOTES
He was working in the yard and developed some large abrasions over the front of both knees right in the area he would need to have the incision for his knee replacement.  As such, it would not be safe to proceed with knee replacement today because of the risk of wound complications and infection.  We will reschedule his knee replacement for a month or so for now when his skin should be in better condition.

## 2024-05-28 NOTE — LETTER
May 2, 2024    Patient Name: Sanjeev Charles  Surgeon Name: Parviz Almonte M.D.  Surgery Facility: [facility] ([address] [facility])  Surgery Date: 5/28/2024    The time of your surgery is not final and may change up to and until the day of your surgery. You will be contacted 24-48 hours prior to your surgery date with your check-in and surgery time.    If you will not be at one of the below numbers please call the surgery scheduler at [phone]  Preferred Phone: [phone]    BEFORE YOUR SURGERY   Pre Registration and/or Lab Work must be done within and no earlier than 28 days prior to your surgery date. Your scheduled facility will contact you regarding all required preregistration and/or lab work. If you have not been contacted within 7 days of your scheduled procedure please call [facility] at [phone] for an appointment as soon as possible.    CT scan is scheduled for 05/14/2024, 2:00pm.  Address: [address] [place] [id number]    DAY OF YOUR SURGERY  Nothing to eat or drink after midnight     Refrain from smoking any substance after midnight prior to surgery. Smoking may interfere with the anesthetic and frequently produces nausea during the recovery period.    Continue taking all lifesaving medications. Including the morning of your surgery with small sip of water.    Please do NOT take on the day of surgery:  Diuretics: examples- furosemide (Lasix), spironolactone, hydrochlorothiazide  ACE-inhibitors: examples- lisinopril, ramipril, enalapril  “ARBs”: examples- losartan, Olmesartan, valsartan    Please arrive at the hospital/surgery center at the check-in time provided.     An adult will need to bring you and take you home after your surgery.     AFTER YOUR SURGERY  Post op Appointment:   Date: 06/12/2024   Time: 09:15am   With: Everett Jean PA-C   Location: [address] [place] [address]    - Therapy- Your first  appointment should be 5-7  day(s) after your surgery. For your convenience we have 4 Physical Therapy locations: [facility], [place], and [facility]. Call our office ASAP to schedule an appointment at [phone] or take the enclosed Therapy Prescription to a facility of your choice.  - No dental work for 3-6 months after your surgery.  - Post Surgery - You will need someone to drive you home  - Post Surgery - You will need someone to stay with you for 24 hours  - You must have someone provide transportation post surgery and someone to monitor you for at least 24 hours post-surgery. If you don't have either of these your appointment will be canceled.     TIME OFF WORK  FMLA or Disability forms can be faxed directly to: [phone] or you may drop them off at [address] [place] [address]. Our office charges a $35.00 fee per form. Forms will be completed within 10 business days of drop off and payment received. For the status of your forms you may contact our disability office directly [phone].    MEDICATION INSTRUCTIONS **Please read section completely**  The following medications should be stopped a minimum of 10 days prior to surgery:  All over the counter, Supplements & Herbal medications    Anorectics: Phentermine (Adipex-P, Lomaira and Suprenza), Phentermine-topiramate (Qsymia), Bupropion-naltrexone (Contrave)    **If you are on Bupropion for anxiety/depression, please continue this medication up until the day of surgery.     Opiod Partial Agonists/Opioid Antagonists: Buprenorphine (Suboxone, Belbuca, Butrans, Probuphine Implant, Sublocade), Naltrexone (ReVia, Vivitrol), Naloxone    Amphetamines: Dextroamphetamine/Amphetamine (Adderall, Mydayis), Methylphenidate Hydrochloride (Concerta, Metadate, Methylin, Ritalin)    The following medications should be stopped 5 days prior to surgery:  Blood Thinners: Any Aspirin, Aspirin products, anti-inflammatories such as ibuprofen and any  blood thinners such as Coumadin and Plavix. Please consult your prescribing physician if you are on life saving blood thinners, in regards to when to stop medications prior to surgery.     The following medications should be stopped a minimum of 3 days prior to surgery:  PDE-5 inhibitors: Sildenafil (Viagra), Tadalafil (Cialis), Vardenafil (Levitra), Avanafil (Stendra)    MAO Inhibitors: Rasagiline (Azilect), Selegiline (Eldepryl, Emsam, Selapar), Isocarboxazid (Marplan), Phenelzine (Nardil)       COVID and INFLUENZA NOTICE TO PATIENTS    Currently, the [facility] does not routinely test patients for COVID-19 or Influenza prior to their elective surgery.  However, if patients develop the following symptoms prior to their surgery date, they should voluntarily test for COVID-19 and Influenza and notify the surgical office of their condition and results.  The symptoms warranting testing would be any two of the following:    Fever (Temp above 100.4 F)  Chills  Cough  Shortness of breath or difficulty breathing  Fatigue  Myalgias (muscle or body aches)  Headache  Sore Throat  Congestion or Runny Nose  Nausea or vomiting  Diarrhea  New loss of taste or smell    Having these symptoms prior to surgery can significantly increase your risk of morbidity and mortality under anesthesia, which may compromise your health and require a transfer to a hospital for a higher level of care.  Therefore, it is advisable to notify the surgical team of any illness in order to get information for the appropriate time to delay the surgery to minimize these preventable risks.    Your health and safety are our number one priority at the [facility], and we are thankful that you entrust us with your care.  Please help us ensure the best possible surgical and anesthetic outcome by sharing appropriate health information with our perioperative team and staff.  We look forward to taking great care of  you!    Thank you for your time and consideration on this matter.    Jayy Goncalves MD  Medical Director  Anesthesiologist  [facility]

## 2024-06-10 ENCOUNTER — TELEPHONE (OUTPATIENT)
Dept: MEDICAL GROUP | Facility: PHYSICIAN GROUP | Age: 59
End: 2024-06-10
Payer: COMMERCIAL

## 2024-06-10 NOTE — TELEPHONE ENCOUNTER
Name: Sanjeev Charles  Phone number: [phone] (home)     Message: Patient left a voicemail.    Voicemail: Number your voicemail is way way too long, Patient stated his name and birthday, patient stated he doesn't know why we are calling him because he's scheduled for surgery next week, if we are just trying to collect brownie points with his insurance, don't bother, don't do that and hung up.     I looked in to patients' chart, it appears that scheduling may have called patient.

## 2024-06-12 DIAGNOSIS — F41.9 ANXIETY: Chronic | ICD-10-CM

## 2024-06-12 RX ORDER — FLUOXETINE HYDROCHLORIDE 20 MG/1
20 CAPSULE ORAL DAILY
Qty: 90 CAPSULE | Refills: 0 | Status: SHIPPED | OUTPATIENT
Start: 2024-06-12

## 2024-06-13 DIAGNOSIS — I10 ESSENTIAL HYPERTENSION: Chronic | ICD-10-CM

## 2024-06-13 RX ORDER — AMLODIPINE BESYLATE 10 MG/1
10 TABLET ORAL EVERY EVENING
Qty: 90 TABLET | Refills: 2 | Status: SHIPPED | OUTPATIENT
Start: 2024-06-13

## 2024-06-13 NOTE — TELEPHONE ENCOUNTER
Received request via: Patient    Was the patient seen in the last year in this department? Yes    Does the patient have an active prescription (recently filled or refills available) for medication(s) requested? No      Does the patient have residential Plus and need 100 day supply (blood pressure, diabetes and cholesterol meds only)? Patient does not have SCP

## 2024-06-13 NOTE — TELEPHONE ENCOUNTER
Received request via: Patient    Was the patient seen in the last year in this department? Yes    Does the patient have an active prescription (recently filled or refills available) for medication(s) requested? No      Does the patient have halfway Plus and need 100 day supply (blood pressure, diabetes and cholesterol meds only)? Patient does not have SCP

## 2024-06-14 NOTE — DISCHARGE INSTRUCTIONS
Patient will be discharged home and follow up with Dr. Almonte in 2 weeks, for which the patient already has scheduled.    You may call or text Dr. Almonte' medical assistant during business hours at [phone].  You may call the emergency [facility] line during nights/weekends/holidays if needed at [phone].    Instructions:  -Leave the bandage in place until your followup appointment in 2 weeks.  -May shower with bandage in place, if bandage becomes soaked underneath please remove and call the office immediately.  -No baths/tubs/pools/submersion of the wound for now.  -Call if there is significant drainage beneath the bandage    -Put as much weight as comfortable on the operative leg.  Use a walker to assist with balance to avoid any falls.  -It is important to start moving and stretching right away, otherwise the joint can stiffen.    -Take your blood clot prevention medication  - Resume 5mg/day Elaquis on Wednesday     -Use ice frequently to help with pain and swelling control  -Pain management:  Standard pain regimen should be:  Ice as much as possible.  Apply the ice anywhere you feel pain.  Meloxicam (anti-inflammatory)- 7.5mg one tablet every day.  Take this automatically on a scheduled basis.  Do not take any other NSAIDs while taking this.  Tylenol (acetaminophen) - 1000mg every 8 hours. Take this automatically on a scheduled basis.  Tramadol - 1-2 capsules every 6 hours. Take this automatically on a scheduled basis until no longer needed for pain.  Oxycodone - 1-2 tablets every 6 hours only if needed.  You would take the oxycodone 3 hours after the Tramadol, such that you are taking one or the other every 3 hours        -Try to limit the amount of oxycodone since it tends to cause constipation, drowsiness, etc.  But if you need it, take it.        -100mg of Colace (docusate) will be prescribed. Take this twice a day while still taking Tramadol or Oxycodone. Can discontinue after opiates are no longer being  taken.  If bowel movement has not occurred in 48 hours please add in a laxative called Senna (over the counter) twice daily in conjunction with the colace. If no bowel movement is produced 48 hours after adding in Senna please start Milk of Magnesia (over the counter).     AGGRESSIVE RANGE OF MOTION.  Do a quad set a million times per day.  Have a family member push down on the thigh and shin every 20 minutes with 2-3 pillows under the heel to help stretch the knee straight.  Flex the knee as far as possible every 20 minutes.    Discharge Education for patients on MARCELA (Obstructive Sleep Apnea) Protocol    Prior to receiving sedation or anesthesia, we screen all patients for Obstructive Sleep Apnea.  During your screening, you were identified as having Obstructive Sleep Apnea(MARCELA).    What is Obstructive Sleep Apnea?  Sleep apnea (AP-ne-ah) is a common disorder which involves breathing pauses that occur during sleep.  These can last from 10 seconds to a minute or longer.  Normal breathing resumes often with a loud snort or choking sound.    Sleep apnea occurs in all age groups and both genders but is more common in men and people over 40 years of age.  It has been estimated that as many as 18 million Americans have sleep apnea.  Most people who have sleep apnea don’t know they have it because it only occurs during sleep.  A family member and/or bed partner may first notice the signs of sleep apnea.  Sleep apnea is a chronic (ongoing) condition that disrupts the quality and quantity of your sleep repeatedly throughout the night.  This often results in excessive daytime sleepiness or fatigue during the day.  It may also contribute to high blood pressure, heart problems, and complications following medications used for surgery and procedures.      We recommend that you should be with an adult observer for at least 24 hours after your sedation/anesthesia.  If you have a CPAP machine, you should wear it during any sleep  period (day or night) for the week following your procedure.  We encourage you to sleep on your side or in a sitting position, even with napping.  Lying flat on your back increases the risk of apnea and airway obstruction during your post procedure recovery period.    It is important to prevent over-sedation that could increase your risk for apnea.  Please take all pain medication as directed by your physician.  If you are not getting pain relief, please contact your physician to discuss possible approaches to relieving pain while minimizing medications that can affect your breathing and oxygen levels.    Use incentive spirometer as instructed at least 10x/hour every hour you are awake    ACTIVITY: Rest and take it easy for the first 24 hours.  A responsible adult is recommended to remain with you during that time.  It is normal to feel sleepy.  We encourage you to not do anything that requires balance, judgment or coordination.    MILD FLU-LIKE SYMPTOMS ARE NORMAL. YOU MAY EXPERIENCE GENERALIZED MUSCLE ACHES, THROAT IRRITATION, HEADACHE AND/OR SOME NAUSEA.    FOR 24 HOURS DO NOT:  Drive, operate machinery or run household appliances.  Drink beer or alcoholic beverages.   Make important decisions or sign legal documents.    DIET: To avoid nausea, slowly advance diet as tolerated, avoiding spicy or greasy foods for the first day.  Add more substantial food to your diet according to your physician's instructions.  Babies can be fed formula or breast milk as soon as they are hungry.  INCREASE FLUIDS AND FIBER TO AVOID CONSTIPATION.    You should CALL YOUR PHYSICIAN if you develop:  Fever greater than 101 degrees F.  Pain not relieved by medication, or persistent nausea or vomiting.  Excessive bleeding (blood soaking through dressing) or unexpected drainage from the wound.  Extreme redness or swelling around the incision site, drainage of pus or foul smelling drainage.  Inability to urinate or empty your bladder within  8 hours.  Problems with breathing or chest pain.    You should call 911 if you develop problems with breathing or chest pain.  If you are unable to contact your doctor or surgical center, you should go to the nearest emergency room or urgent care center.  Physician's telephone #: [phone]    If any questions arise, call your doctor.  If your doctor is not available, please feel free to call the Surgical Center at [phone].     A registered nurse may call you a few days after your surgery to see how you are doing after your procedure.    MEDICATIONS: Resume taking daily medication.  Take prescribed pain medication with food.  If no medication is prescribed, you may take non-aspirin pain medication if needed.  PAIN MEDICATION CAN BE VERY CONSTIPATING.  Take a stool softener or laxative such as senokot, pericolace, or milk of magnesia if needed.    Last pain medication given at __________________________.    If your physician has prescribed pain medication that includes Acetaminophen (Tylenol), do not take additional Acetaminophen (Tylenol) while taking the prescribed medication.    Peripheral Nerve Block Discharge Instructions from Same Day Surgery and Inpatient :    What to Expect - Lower Extremity  The block may cause you to experience numbness and weakness in your thigh and knee or calf and foot on the same side as your surgery  Numbness, tingling and / or weakness are all normal. For some people, this may be an unpleasant sensation  These issues will be resolved when the local anesthetic wears off   You may experience numbness and tingling in your thigh on the same side as your surgery if the block medicine was injected at your groin area  Numbness will make it difficult to walk  You may have problems with balance and walking so be very careful   Follow your surgeon's direction regarding weight bearing on your surgical limb  Be very careful with your numb limb  Precautions  The numbness may affect your  "balance  Be careful when walking or moving around  Your leg may be weak: be very careful putting weight on it  If your surgeon did not specify a time, you should not bear weight for 24 hours  Be sure to ask for help when you need it  It is better to have help than to fall and hurt yourself  Prevent Injury  Protect the limb like a baby  Beware of exposing your limb to extreme heat or cold or trauma  The limb may be injured without you noticing because it is numb  Keep the limb elevated whenever possible  Do not sleep on the limb  Change the position of the limb regularly  Avoid putting pressure on your surgical limb  Pain Control  The initial block on the day of surgery will make your extremity feel \"numb\"  Any consecutive injection including prior to discharge from the hospital will make your extremity feel \"numb\"  You may feel an aching or burning when the local anesthesia starts to wear off  Take pain pills as prescribed by your surgeon  Call your surgeon or anesthesiologist if you do not have adequate pain control  "

## 2024-06-17 ENCOUNTER — TELEPHONE (OUTPATIENT)
Dept: CARDIOLOGY | Facility: MEDICAL CENTER | Age: 59
End: 2024-06-17
Payer: COMMERCIAL

## 2024-06-17 ENCOUNTER — ANESTHESIA EVENT (OUTPATIENT)
Dept: SURGERY | Facility: MEDICAL CENTER | Age: 59
End: 2024-06-17
Payer: COMMERCIAL

## 2024-06-17 NOTE — PREPROCEDURE INSTRUCTIONS
6/17/24 approx 1300hrs Case msg sent to [facility] (Dr Almonte included) and spoke with Clotilde Ochoa ( for [facility]) asked her to inform Dr. Almonte of the following:     Just was in contact with patient who stated he has stopped all his medications several weeks ago and has not taken anything since. Patient was upset because prior procedure was cancelled on 5/28 (he stated d/t wound on operative leg). Patient stated “did not understand why I was not advised on medications” so he “stayed off of them”. There are several medications which he should have continued even the day of his procedure i.e. metoprolol. Some of his labs are dated and will have to be re-done DOS. MRSA was pos when last tested approx.. 5/9/2024. Just want you to be aware as anesthesia protocol has not been followed for his medication regiment. Call [phone] with any questions.     In addition to the above Yamile Hernández NP [facility] was notified that patient has stated that he was non-compliant with his Eliquis and metoprolol, stopped prior to 5/28/24. OR Claudioer at [facility] also informed of above info.

## 2024-06-17 NOTE — TELEPHONE ENCOUNTER
Caller: Elian RN with [facility]     Topic/issue: Elian called because he was speaking with the patient and learned he is not compliant with his medications and he wanted to be sure cardio is aware.    Please advise.    Callback Number: see routing comments     Thank you,     Maggie SEXTON

## 2024-06-17 NOTE — TELEPHONE ENCOUNTER
Phone number called: ATTILA Plummer (Surgery Preadmit)    To : ~Just FYI.  I spoke with ATTILA Plummer from preadmit, he states that pt is non compliant with all of his medications since 05/28/24. Pt is scheduled for Robotic R Total Knee Arthroplasty on 06/18/24. Anesthesia also made aware. Thank you.

## 2024-06-18 ENCOUNTER — HOSPITAL ENCOUNTER (OUTPATIENT)
Facility: MEDICAL CENTER | Age: 59
End: 2024-06-18
Attending: ORTHOPAEDIC SURGERY | Admitting: ORTHOPAEDIC SURGERY
Payer: COMMERCIAL

## 2024-06-18 ENCOUNTER — ANESTHESIA (OUTPATIENT)
Dept: SURGERY | Facility: MEDICAL CENTER | Age: 59
End: 2024-06-18
Payer: COMMERCIAL

## 2024-06-18 VITALS
BODY MASS INDEX: 30.37 KG/M2 | RESPIRATION RATE: 16 BRPM | DIASTOLIC BLOOD PRESSURE: 89 MMHG | SYSTOLIC BLOOD PRESSURE: 140 MMHG | WEIGHT: 244.27 LBS | TEMPERATURE: 97 F | OXYGEN SATURATION: 92 % | HEIGHT: 75 IN | HEART RATE: 87 BPM

## 2024-06-18 DIAGNOSIS — G89.18 POST-OPERATIVE PAIN: ICD-10-CM

## 2024-06-18 DIAGNOSIS — M17.11 PRIMARY OSTEOARTHRITIS OF RIGHT KNEE: ICD-10-CM

## 2024-06-18 LAB
ALBUMIN SERPL BCP-MCNC: 4.1 G/DL (ref 3.2–4.9)
ALBUMIN/GLOB SERPL: 1.8 G/DL
ALP SERPL-CCNC: 83 U/L (ref 30–99)
ALT SERPL-CCNC: 20 U/L (ref 2–50)
ANION GAP SERPL CALC-SCNC: 12 MMOL/L (ref 7–16)
AST SERPL-CCNC: 24 U/L (ref 12–45)
BILIRUB SERPL-MCNC: 0.9 MG/DL (ref 0.1–1.5)
BUN SERPL-MCNC: 12 MG/DL (ref 8–22)
CALCIUM ALBUM COR SERPL-MCNC: 8.6 MG/DL (ref 8.5–10.5)
CALCIUM SERPL-MCNC: 8.7 MG/DL (ref 8.4–10.2)
CHLORIDE SERPL-SCNC: 106 MMOL/L (ref 96–112)
CO2 SERPL-SCNC: 22 MMOL/L (ref 20–33)
CREAT SERPL-MCNC: 0.84 MG/DL (ref 0.5–1.4)
GFR SERPLBLD CREATININE-BSD FMLA CKD-EPI: 99 ML/MIN/1.73 M 2
GLOBULIN SER CALC-MCNC: 2.3 G/DL (ref 1.9–3.5)
GLUCOSE SERPL-MCNC: 103 MG/DL (ref 65–99)
POTASSIUM SERPL-SCNC: 3.4 MMOL/L (ref 3.6–5.5)
PROT SERPL-MCNC: 6.4 G/DL (ref 6–8.2)
SODIUM SERPL-SCNC: 140 MMOL/L (ref 135–145)

## 2024-06-18 PROCEDURE — 700102 HCHG RX REV CODE 250 W/ 637 OVERRIDE(OP): Performed by: STUDENT IN AN ORGANIZED HEALTH CARE EDUCATION/TRAINING PROGRAM

## 2024-06-18 PROCEDURE — 160009 HCHG ANES TIME/MIN: Performed by: ORTHOPAEDIC SURGERY

## 2024-06-18 PROCEDURE — C1713 ANCHOR/SCREW BN/BN,TIS/BN: HCPCS | Performed by: ORTHOPAEDIC SURGERY

## 2024-06-18 PROCEDURE — 64447 NJX AA&/STRD FEMORAL NRV IMG: CPT | Performed by: ORTHOPAEDIC SURGERY

## 2024-06-18 PROCEDURE — 700111 HCHG RX REV CODE 636 W/ 250 OVERRIDE (IP): Mod: JZ | Performed by: STUDENT IN AN ORGANIZED HEALTH CARE EDUCATION/TRAINING PROGRAM

## 2024-06-18 PROCEDURE — 20985 CPTR-ASST DIR MS PX: CPT | Mod: ASROC | Performed by: STUDENT IN AN ORGANIZED HEALTH CARE EDUCATION/TRAINING PROGRAM

## 2024-06-18 PROCEDURE — 97162 PT EVAL MOD COMPLEX 30 MIN: CPT

## 2024-06-18 PROCEDURE — 27447 TOTAL KNEE ARTHROPLASTY: CPT | Mod: ASROC,RT | Performed by: STUDENT IN AN ORGANIZED HEALTH CARE EDUCATION/TRAINING PROGRAM

## 2024-06-18 PROCEDURE — 27447 TOTAL KNEE ARTHROPLASTY: CPT | Mod: RT | Performed by: ORTHOPAEDIC SURGERY

## 2024-06-18 PROCEDURE — 160048 HCHG OR STATISTICAL LEVEL 1-5: Performed by: ORTHOPAEDIC SURGERY

## 2024-06-18 PROCEDURE — C1776 JOINT DEVICE (IMPLANTABLE): HCPCS | Performed by: ORTHOPAEDIC SURGERY

## 2024-06-18 PROCEDURE — 80053 COMPREHEN METABOLIC PANEL: CPT

## 2024-06-18 PROCEDURE — 160046 HCHG PACU - 1ST 60 MINS PHASE II: Performed by: ORTHOPAEDIC SURGERY

## 2024-06-18 PROCEDURE — 700101 HCHG RX REV CODE 250: Performed by: STUDENT IN AN ORGANIZED HEALTH CARE EDUCATION/TRAINING PROGRAM

## 2024-06-18 PROCEDURE — A9270 NON-COVERED ITEM OR SERVICE: HCPCS | Performed by: STUDENT IN AN ORGANIZED HEALTH CARE EDUCATION/TRAINING PROGRAM

## 2024-06-18 PROCEDURE — 700111 HCHG RX REV CODE 636 W/ 250 OVERRIDE (IP): Mod: JZ | Performed by: ORTHOPAEDIC SURGERY

## 2024-06-18 PROCEDURE — 160031 HCHG SURGERY MINUTES - 1ST 30 MINS LEVEL 5: Performed by: ORTHOPAEDIC SURGERY

## 2024-06-18 PROCEDURE — 160042 HCHG SURGERY MINUTES - EA ADDL 1 MIN LEVEL 5: Performed by: ORTHOPAEDIC SURGERY

## 2024-06-18 PROCEDURE — 502714 HCHG ROBOTIC SURGERY SERVICES: Performed by: ORTHOPAEDIC SURGERY

## 2024-06-18 PROCEDURE — 700105 HCHG RX REV CODE 258: Performed by: ORTHOPAEDIC SURGERY

## 2024-06-18 PROCEDURE — 20985 CPTR-ASST DIR MS PX: CPT | Performed by: ORTHOPAEDIC SURGERY

## 2024-06-18 PROCEDURE — 160002 HCHG RECOVERY MINUTES (STAT): Performed by: ORTHOPAEDIC SURGERY

## 2024-06-18 PROCEDURE — 160047 HCHG PACU  - EA ADDL 30 MINS PHASE II: Performed by: ORTHOPAEDIC SURGERY

## 2024-06-18 PROCEDURE — 36415 COLL VENOUS BLD VENIPUNCTURE: CPT

## 2024-06-18 PROCEDURE — 160035 HCHG PACU - 1ST 60 MINS PHASE I: Performed by: ORTHOPAEDIC SURGERY

## 2024-06-18 PROCEDURE — 97110 THERAPEUTIC EXERCISES: CPT

## 2024-06-18 PROCEDURE — 502000 HCHG MISC OR IMPLANTS RC 0278: Performed by: ORTHOPAEDIC SURGERY

## 2024-06-18 PROCEDURE — 160025 RECOVERY II MINUTES (STATS): Performed by: ORTHOPAEDIC SURGERY

## 2024-06-18 PROCEDURE — 700111 HCHG RX REV CODE 636 W/ 250 OVERRIDE (IP): Performed by: ORTHOPAEDIC SURGERY

## 2024-06-18 PROCEDURE — 700101 HCHG RX REV CODE 250: Performed by: ORTHOPAEDIC SURGERY

## 2024-06-18 DEVICE — IMPLANTABLE DEVICE: Type: IMPLANTABLE DEVICE | Status: FUNCTIONAL

## 2024-06-18 RX ORDER — AMOXICILLIN 250 MG
1 CAPSULE ORAL
Status: CANCELLED | OUTPATIENT
Start: 2024-06-18

## 2024-06-18 RX ORDER — TRAMADOL HYDROCHLORIDE 50 MG/1
50-75 TABLET ORAL EVERY 6 HOURS PRN
Qty: 35 TABLET | Refills: 0 | Status: SHIPPED | OUTPATIENT
Start: 2024-06-18 | End: 2024-06-25

## 2024-06-18 RX ORDER — ACETAMINOPHEN 500 MG
1000 TABLET ORAL EVERY 6 HOURS
Status: CANCELLED | OUTPATIENT
Start: 2024-06-18 | End: 2024-06-23

## 2024-06-18 RX ORDER — CEFAZOLIN SODIUM 1 G/3ML
2 INJECTION, POWDER, FOR SOLUTION INTRAMUSCULAR; INTRAVENOUS ONCE
Status: COMPLETED | OUTPATIENT
Start: 2024-06-18 | End: 2024-06-18

## 2024-06-18 RX ORDER — SODIUM CHLORIDE, SODIUM LACTATE, POTASSIUM CHLORIDE, CALCIUM CHLORIDE 600; 310; 30; 20 MG/100ML; MG/100ML; MG/100ML; MG/100ML
INJECTION, SOLUTION INTRAVENOUS CONTINUOUS
Status: ACTIVE | OUTPATIENT
Start: 2024-06-18 | End: 2024-06-18

## 2024-06-18 RX ORDER — DOCUSATE SODIUM 100 MG/1
100 CAPSULE, LIQUID FILLED ORAL 2 TIMES DAILY
Status: CANCELLED | OUTPATIENT
Start: 2024-06-18

## 2024-06-18 RX ORDER — ROPIVACAINE HYDROCHLORIDE 5 MG/ML
INJECTION, SOLUTION EPIDURAL; INFILTRATION; PERINEURAL
Status: DISCONTINUED | OUTPATIENT
Start: 2024-06-18 | End: 2024-06-18 | Stop reason: HOSPADM

## 2024-06-18 RX ORDER — SODIUM CHLORIDE, SODIUM LACTATE, POTASSIUM CHLORIDE, CALCIUM CHLORIDE 600; 310; 30; 20 MG/100ML; MG/100ML; MG/100ML; MG/100ML
INJECTION, SOLUTION INTRAVENOUS CONTINUOUS
Status: DISCONTINUED | OUTPATIENT
Start: 2024-06-18 | End: 2024-06-18 | Stop reason: HOSPADM

## 2024-06-18 RX ORDER — FLUOXETINE HYDROCHLORIDE 20 MG/1
20 CAPSULE ORAL DAILY
Status: CANCELLED | OUTPATIENT
Start: 2024-06-18

## 2024-06-18 RX ORDER — MAGNESIUM SULFATE HEPTAHYDRATE 40 MG/ML
INJECTION, SOLUTION INTRAVENOUS PRN
Status: DISCONTINUED | OUTPATIENT
Start: 2024-06-18 | End: 2024-06-18 | Stop reason: SURG

## 2024-06-18 RX ORDER — DEXAMETHASONE SODIUM PHOSPHATE 4 MG/ML
4 INJECTION, SOLUTION INTRA-ARTICULAR; INTRALESIONAL; INTRAMUSCULAR; INTRAVENOUS; SOFT TISSUE
Status: CANCELLED | OUTPATIENT
Start: 2024-06-18

## 2024-06-18 RX ORDER — HALOPERIDOL 5 MG/ML
1 INJECTION INTRAMUSCULAR EVERY 6 HOURS PRN
Status: CANCELLED | OUTPATIENT
Start: 2024-06-18

## 2024-06-18 RX ORDER — HYDRALAZINE HYDROCHLORIDE 20 MG/ML
5 INJECTION INTRAMUSCULAR; INTRAVENOUS
Status: DISCONTINUED | OUTPATIENT
Start: 2024-06-18 | End: 2024-06-18 | Stop reason: HOSPADM

## 2024-06-18 RX ORDER — SCOLOPAMINE TRANSDERMAL SYSTEM 1 MG/1
1 PATCH, EXTENDED RELEASE TRANSDERMAL
Status: CANCELLED | OUTPATIENT
Start: 2024-06-18

## 2024-06-18 RX ORDER — VANCOMYCIN HYDROCHLORIDE 1 G/20ML
INJECTION, POWDER, LYOPHILIZED, FOR SOLUTION INTRAVENOUS
Status: COMPLETED | OUTPATIENT
Start: 2024-06-18 | End: 2024-06-18

## 2024-06-18 RX ORDER — TRANEXAMIC ACID 100 MG/ML
INJECTION, SOLUTION INTRAVENOUS PRN
Status: DISCONTINUED | OUTPATIENT
Start: 2024-06-18 | End: 2024-06-18 | Stop reason: SURG

## 2024-06-18 RX ORDER — DIPHENHYDRAMINE HCL 25 MG
25 TABLET ORAL EVERY 6 HOURS PRN
Status: CANCELLED | OUTPATIENT
Start: 2024-06-18

## 2024-06-18 RX ORDER — HYDROMORPHONE HYDROCHLORIDE 1 MG/ML
0.1 INJECTION, SOLUTION INTRAMUSCULAR; INTRAVENOUS; SUBCUTANEOUS
Status: DISCONTINUED | OUTPATIENT
Start: 2024-06-18 | End: 2024-06-18 | Stop reason: HOSPADM

## 2024-06-18 RX ORDER — DIPHENHYDRAMINE HYDROCHLORIDE 50 MG/ML
25 INJECTION INTRAMUSCULAR; INTRAVENOUS EVERY 6 HOURS PRN
Status: CANCELLED | OUTPATIENT
Start: 2024-06-18

## 2024-06-18 RX ORDER — LABETALOL HYDROCHLORIDE 5 MG/ML
5 INJECTION, SOLUTION INTRAVENOUS
Status: DISCONTINUED | OUTPATIENT
Start: 2024-06-18 | End: 2024-06-18 | Stop reason: HOSPADM

## 2024-06-18 RX ORDER — DIPHENHYDRAMINE HYDROCHLORIDE 50 MG/ML
12.5 INJECTION INTRAMUSCULAR; INTRAVENOUS
Status: DISCONTINUED | OUTPATIENT
Start: 2024-06-18 | End: 2024-06-18 | Stop reason: HOSPADM

## 2024-06-18 RX ORDER — MIDAZOLAM HYDROCHLORIDE 1 MG/ML
INJECTION INTRAMUSCULAR; INTRAVENOUS PRN
Status: DISCONTINUED | OUTPATIENT
Start: 2024-06-18 | End: 2024-06-18 | Stop reason: SURG

## 2024-06-18 RX ORDER — METOPROLOL TARTRATE 1 MG/ML
INJECTION, SOLUTION INTRAVENOUS PRN
Status: DISCONTINUED | OUTPATIENT
Start: 2024-06-18 | End: 2024-06-18 | Stop reason: SURG

## 2024-06-18 RX ORDER — AMLODIPINE BESYLATE 5 MG/1
10 TABLET ORAL EVERY EVENING
Status: CANCELLED | OUTPATIENT
Start: 2024-06-18

## 2024-06-18 RX ORDER — HYDROMORPHONE HYDROCHLORIDE 2 MG/ML
INJECTION, SOLUTION INTRAMUSCULAR; INTRAVENOUS; SUBCUTANEOUS PRN
Status: DISCONTINUED | OUTPATIENT
Start: 2024-06-18 | End: 2024-06-18 | Stop reason: SURG

## 2024-06-18 RX ORDER — OXYCODONE HYDROCHLORIDE 5 MG/1
5-7.5 TABLET ORAL EVERY 6 HOURS PRN
Qty: 42 TABLET | Refills: 0 | Status: SHIPPED | OUTPATIENT
Start: 2024-06-18 | End: 2024-06-25

## 2024-06-18 RX ORDER — ACETAMINOPHEN 500 MG
1000 TABLET ORAL EVERY 6 HOURS PRN
Status: CANCELLED | OUTPATIENT
Start: 2024-06-23

## 2024-06-18 RX ORDER — HALOPERIDOL 5 MG/ML
1 INJECTION INTRAMUSCULAR
Status: DISCONTINUED | OUTPATIENT
Start: 2024-06-18 | End: 2024-06-18 | Stop reason: HOSPADM

## 2024-06-18 RX ORDER — DIPHENHYDRAMINE HCL 25 MG
25 TABLET ORAL NIGHTLY PRN
Status: CANCELLED | OUTPATIENT
Start: 2024-06-19

## 2024-06-18 RX ORDER — POLYETHYLENE GLYCOL 3350 17 G/17G
1 POWDER, FOR SOLUTION ORAL 2 TIMES DAILY PRN
Status: CANCELLED | OUTPATIENT
Start: 2024-06-18

## 2024-06-18 RX ORDER — HYDROMORPHONE HYDROCHLORIDE 1 MG/ML
0.5 INJECTION, SOLUTION INTRAMUSCULAR; INTRAVENOUS; SUBCUTANEOUS
Status: CANCELLED | OUTPATIENT
Start: 2024-06-18

## 2024-06-18 RX ORDER — ENEMA 19; 7 G/133ML; G/133ML
1 ENEMA RECTAL
Status: CANCELLED | OUTPATIENT
Start: 2024-06-18

## 2024-06-18 RX ORDER — ROPIVACAINE HYDROCHLORIDE 5 MG/ML
INJECTION, SOLUTION EPIDURAL; INFILTRATION; PERINEURAL
Status: COMPLETED | OUTPATIENT
Start: 2024-06-18 | End: 2024-06-18

## 2024-06-18 RX ORDER — AMOXICILLIN 250 MG
1 CAPSULE ORAL NIGHTLY
Status: CANCELLED | OUTPATIENT
Start: 2024-06-18

## 2024-06-18 RX ORDER — KETOROLAC TROMETHAMINE 15 MG/ML
15 INJECTION, SOLUTION INTRAMUSCULAR; INTRAVENOUS EVERY 6 HOURS
Status: CANCELLED | OUTPATIENT
Start: 2024-06-18 | End: 2024-06-21

## 2024-06-18 RX ORDER — METOPROLOL SUCCINATE 25 MG/1
50 TABLET, EXTENDED RELEASE ORAL DAILY
Status: CANCELLED | OUTPATIENT
Start: 2024-06-18

## 2024-06-18 RX ORDER — HYDROMORPHONE HYDROCHLORIDE 1 MG/ML
0.2 INJECTION, SOLUTION INTRAMUSCULAR; INTRAVENOUS; SUBCUTANEOUS
Status: DISCONTINUED | OUTPATIENT
Start: 2024-06-18 | End: 2024-06-18 | Stop reason: HOSPADM

## 2024-06-18 RX ORDER — OMEPRAZOLE 20 MG/1
20 CAPSULE, DELAYED RELEASE ORAL 2 TIMES DAILY PRN
Status: CANCELLED | OUTPATIENT
Start: 2024-06-18 | End: 2024-06-28

## 2024-06-18 RX ORDER — ONDANSETRON 2 MG/ML
4 INJECTION INTRAMUSCULAR; INTRAVENOUS EVERY 4 HOURS PRN
Status: CANCELLED | OUTPATIENT
Start: 2024-06-18

## 2024-06-18 RX ORDER — DOCUSATE SODIUM 100 MG/1
100 CAPSULE, LIQUID FILLED ORAL 2 TIMES DAILY
Qty: 60 CAPSULE | Refills: 0 | Status: SHIPPED | OUTPATIENT
Start: 2024-06-18 | End: 2024-07-18

## 2024-06-18 RX ORDER — OXYCODONE HYDROCHLORIDE 5 MG/1
5 TABLET ORAL
Status: CANCELLED | OUTPATIENT
Start: 2024-06-18

## 2024-06-18 RX ORDER — DEXAMETHASONE SODIUM PHOSPHATE 4 MG/ML
INJECTION, SOLUTION INTRA-ARTICULAR; INTRALESIONAL; INTRAMUSCULAR; INTRAVENOUS; SOFT TISSUE
Status: COMPLETED | OUTPATIENT
Start: 2024-06-18 | End: 2024-06-18

## 2024-06-18 RX ORDER — TRAMADOL HYDROCHLORIDE 50 MG/1
50 TABLET ORAL EVERY 4 HOURS PRN
Status: CANCELLED | OUTPATIENT
Start: 2024-06-18

## 2024-06-18 RX ORDER — DEXAMETHASONE SODIUM PHOSPHATE 4 MG/ML
INJECTION, SOLUTION INTRA-ARTICULAR; INTRALESIONAL; INTRAMUSCULAR; INTRAVENOUS; SOFT TISSUE PRN
Status: DISCONTINUED | OUTPATIENT
Start: 2024-06-18 | End: 2024-06-18 | Stop reason: SURG

## 2024-06-18 RX ORDER — SODIUM CHLORIDE 9 MG/ML
INJECTION, SOLUTION INTRAMUSCULAR; INTRAVENOUS; SUBCUTANEOUS
Status: DISCONTINUED | OUTPATIENT
Start: 2024-06-18 | End: 2024-06-18 | Stop reason: HOSPADM

## 2024-06-18 RX ORDER — OXYCODONE HYDROCHLORIDE 10 MG/1
10 TABLET ORAL
Status: CANCELLED | OUTPATIENT
Start: 2024-06-18

## 2024-06-18 RX ORDER — HYDROMORPHONE HYDROCHLORIDE 1 MG/ML
0.4 INJECTION, SOLUTION INTRAMUSCULAR; INTRAVENOUS; SUBCUTANEOUS
Status: DISCONTINUED | OUTPATIENT
Start: 2024-06-18 | End: 2024-06-18 | Stop reason: HOSPADM

## 2024-06-18 RX ORDER — ONDANSETRON 2 MG/ML
4 INJECTION INTRAMUSCULAR; INTRAVENOUS
Status: DISCONTINUED | OUTPATIENT
Start: 2024-06-18 | End: 2024-06-18 | Stop reason: HOSPADM

## 2024-06-18 RX ORDER — KETOROLAC TROMETHAMINE 30 MG/ML
INJECTION, SOLUTION INTRAMUSCULAR; INTRAVENOUS
Status: DISCONTINUED | OUTPATIENT
Start: 2024-06-18 | End: 2024-06-18 | Stop reason: HOSPADM

## 2024-06-18 RX ORDER — OXYCODONE HCL 5 MG/5 ML
5 SOLUTION, ORAL ORAL
Status: DISCONTINUED | OUTPATIENT
Start: 2024-06-18 | End: 2024-06-18 | Stop reason: HOSPADM

## 2024-06-18 RX ORDER — OXYCODONE HCL 5 MG/5 ML
10 SOLUTION, ORAL ORAL
Status: DISCONTINUED | OUTPATIENT
Start: 2024-06-18 | End: 2024-06-18 | Stop reason: HOSPADM

## 2024-06-18 RX ORDER — METOPROLOL TARTRATE 1 MG/ML
1 INJECTION, SOLUTION INTRAVENOUS
Status: DISCONTINUED | OUTPATIENT
Start: 2024-06-18 | End: 2024-06-18 | Stop reason: HOSPADM

## 2024-06-18 RX ORDER — MEPERIDINE HYDROCHLORIDE 25 MG/ML
12.5 INJECTION INTRAMUSCULAR; INTRAVENOUS; SUBCUTANEOUS
Status: DISCONTINUED | OUTPATIENT
Start: 2024-06-18 | End: 2024-06-18 | Stop reason: HOSPADM

## 2024-06-18 RX ORDER — EPINEPHRINE 1 MG/ML(1)
AMPUL (ML) INJECTION
Status: DISCONTINUED | OUTPATIENT
Start: 2024-06-18 | End: 2024-06-18 | Stop reason: HOSPADM

## 2024-06-18 RX ORDER — LIDOCAINE HYDROCHLORIDE 20 MG/ML
INJECTION, SOLUTION EPIDURAL; INFILTRATION; INTRACAUDAL; PERINEURAL PRN
Status: DISCONTINUED | OUTPATIENT
Start: 2024-06-18 | End: 2024-06-18 | Stop reason: SURG

## 2024-06-18 RX ORDER — BISACODYL 10 MG
10 SUPPOSITORY, RECTAL RECTAL
Status: CANCELLED | OUTPATIENT
Start: 2024-06-18

## 2024-06-18 RX ORDER — ONDANSETRON 2 MG/ML
INJECTION INTRAMUSCULAR; INTRAVENOUS PRN
Status: DISCONTINUED | OUTPATIENT
Start: 2024-06-18 | End: 2024-06-18 | Stop reason: SURG

## 2024-06-18 RX ORDER — ACETAMINOPHEN 500 MG
1000 TABLET ORAL ONCE
Status: COMPLETED | OUTPATIENT
Start: 2024-06-18 | End: 2024-06-18

## 2024-06-18 RX ADMIN — ACETAMINOPHEN 1000 MG: 500 TABLET, FILM COATED ORAL at 10:39

## 2024-06-18 RX ADMIN — DEXAMETHASONE SODIUM PHOSPHATE 1.6 MG: 4 INJECTION INTRA-ARTICULAR; INTRALESIONAL; INTRAMUSCULAR; INTRAVENOUS; SOFT TISSUE at 11:26

## 2024-06-18 RX ADMIN — CEFAZOLIN 3 G: 1 INJECTION, POWDER, FOR SOLUTION INTRAMUSCULAR; INTRAVENOUS at 11:41

## 2024-06-18 RX ADMIN — METOPROLOL TARTRATE 2.5 MG: 5 INJECTION INTRAVENOUS at 11:48

## 2024-06-18 RX ADMIN — MAGNESIUM SULFATE HEPTAHYDRATE 2 G: 2 INJECTION, SOLUTION INTRAVENOUS at 11:55

## 2024-06-18 RX ADMIN — PROPOFOL 50 MG: 10 INJECTION, EMULSION INTRAVENOUS at 11:44

## 2024-06-18 RX ADMIN — HYDROMORPHONE HYDROCHLORIDE 1 MG: 2 INJECTION INTRAMUSCULAR; INTRAVENOUS; SUBCUTANEOUS at 11:36

## 2024-06-18 RX ADMIN — DEXAMETHASONE SODIUM PHOSPHATE 6 MG: 4 INJECTION, SOLUTION INTRA-ARTICULAR; INTRALESIONAL; INTRAMUSCULAR; INTRAVENOUS; SOFT TISSUE at 11:41

## 2024-06-18 RX ADMIN — TRANEXAMIC ACID 1000 MG: 100 INJECTION, SOLUTION INTRAVENOUS at 11:42

## 2024-06-18 RX ADMIN — SODIUM CHLORIDE, POTASSIUM CHLORIDE, SODIUM LACTATE AND CALCIUM CHLORIDE: 600; 310; 30; 20 INJECTION, SOLUTION INTRAVENOUS at 10:39

## 2024-06-18 RX ADMIN — PROPOFOL 200 MG: 10 INJECTION, EMULSION INTRAVENOUS at 11:38

## 2024-06-18 RX ADMIN — ROPIVACAINE HYDROCHLORIDE 25 ML: 5 INJECTION EPIDURAL; INFILTRATION; PERINEURAL at 11:26

## 2024-06-18 RX ADMIN — MIDAZOLAM HYDROCHLORIDE 2 MG: 2 INJECTION, SOLUTION INTRAMUSCULAR; INTRAVENOUS at 11:26

## 2024-06-18 RX ADMIN — TRANEXAMIC ACID 1000 MG: 100 INJECTION, SOLUTION INTRAVENOUS at 12:37

## 2024-06-18 RX ADMIN — METOPROLOL TARTRATE 2.5 MG: 5 INJECTION INTRAVENOUS at 12:44

## 2024-06-18 RX ADMIN — HYDROMORPHONE HYDROCHLORIDE 0.5 MG: 2 INJECTION INTRAMUSCULAR; INTRAVENOUS; SUBCUTANEOUS at 11:56

## 2024-06-18 RX ADMIN — LIDOCAINE HYDROCHLORIDE 60 MG: 20 INJECTION, SOLUTION EPIDURAL; INFILTRATION; INTRACAUDAL at 11:38

## 2024-06-18 RX ADMIN — ONDANSETRON 4 MG: 2 INJECTION INTRAMUSCULAR; INTRAVENOUS at 12:38

## 2024-06-18 ASSESSMENT — GAIT ASSESSMENTS
GAIT LEVEL OF ASSIST: STANDBY ASSIST
DISTANCE (FEET): 80
DEVIATION: DECREASED HEEL STRIKE;DECREASED TOE OFF
ASSISTIVE DEVICE: FRONT WHEEL WALKER

## 2024-06-18 ASSESSMENT — COGNITIVE AND FUNCTIONAL STATUS - GENERAL
CLIMB 3 TO 5 STEPS WITH RAILING: A LITTLE
WALKING IN HOSPITAL ROOM: A LITTLE
TURNING FROM BACK TO SIDE WHILE IN FLAT BAD: A LITTLE
MOBILITY SCORE: 18
MOVING FROM LYING ON BACK TO SITTING ON SIDE OF FLAT BED: A LITTLE
MOVING TO AND FROM BED TO CHAIR: A LITTLE
SUGGESTED CMS G CODE MODIFIER MOBILITY: CK
STANDING UP FROM CHAIR USING ARMS: A LITTLE

## 2024-06-18 ASSESSMENT — FIBROSIS 4 INDEX: FIB4 SCORE: 1.25

## 2024-06-18 ASSESSMENT — PAIN DESCRIPTION - PAIN TYPE
TYPE: CHRONIC PAIN
TYPE: SURGICAL PAIN

## 2024-06-18 NOTE — OR NURSING
1415: Report received from ATTILA Rajput. Patient sitting up in recliner, appears to be comfortable. Awaiting PT arrival to work with patient.     1422: PT here to work with patient.     1445: PT finished with patient, per PT patient is okay to go home.     1500: Discharge instructions read with patient and his wife, all questions have been answered. Patient meets criteria for discharge.     1505: Report given to ATTILA Rajput.

## 2024-06-18 NOTE — OR NURSING
1353: Pt walked to stage ll via walker w/ CNA standby assist. Pt has very little pain, no nausea. Awaiting PT.    1415: Report given to JASON Segura RN.

## 2024-06-18 NOTE — TELEPHONE ENCOUNTER
Please call patient and let him know that we recommend that he take his medications as prescribed. If he has any questions that cannot wait until his follow up appointment he is welcome to come in sooner

## 2024-06-18 NOTE — OP REPORT
Preop Diagnosis: Advanced right knee arthritis  Postop Diagnosis:  Same  Procedure: Right total knee arthroplasty, Use of intraoperative robotic navigation for knee replacement  Surgeon: Dr. Parviz Almonte MD  Assistant: Everett Jean PA-C  Anesthesia: Dr. Romano. General + Adductor canal block  Estimated Blood Loss: 50cc  Drains: None  Complications: None    Implants: Homero Triathlon CR Cementless, size 7 femur, size 8 tibia, with a 9 mm CS insert and 40 oval patella.    Indications: Sanjeev Charles is a 58 y.o. male who has had severe progressive knee pain that failed conservative management.  There were severe degenerative changes on radiographs.  We discussed the options and he was ultimately indicated for knee replacement.  We discussed the risk of bleeding, transfusion, pain, neurovascular injury, stiffness, fracture, infection, wound complication, loosening of the parts over time, blood clots, and medical complication.  No guarantees were made and he elected to proceed.    Pertinent Findings and Releases: There were severe degenerative changes in all compartments.  His tibial staples were never visualized and were out of the way of the implant, so were left in place.  At the end of the case, the knee easily came to full extension and flexed up to calf/thigh impingement with the arthrotomy closed.  The patella tracked centrally.    Informed consent and site marking were confirmed in preop.  An adductor canal block had been performed by the anesthesiologist, and then he was brought back to the OR where anesthesia was performed. Supine positioning was perfmored with all bony prominences well padded with a padded tourniquet on the thigh.  The extremity was prepped and draped in the usual sterile fashion. I performed a pre-procedure timeout to confirm we had the correct patient, side, site, procedure, and presence of necessary personal and equipment.  I confirmed that Ancef and tranexamic acid were given.  The  tourniquet was then inflated.    A midline incision was made medial to the tibial tubercle centered over patella taken down to the deep capsule of the knee. A short medial parapatellar arthrotomy was performed.  The fat pad was released. The patella was subluxated and the knee was flexed. The remnants of the anterior horn of the medial and lateral meniscus were removed.  Two pins were placed into the proximal tibia within the incision and two additional pins were placed into the femoral diaphysis through stab incisions proximal to the knee incision to dock the robotic sensors.  The knee was then registered and taken through a range of motion to gauge the ligament balance.  The surgical plan was then modified on the computer, and all of the bone cuts were made without any difficulties.  The knee was then tensed at 90 degrees and the meniscal remnants and posterior osteophytes were removed.  The posterior capsule was injected with a local anesthetic cocktail.  The tibia was then subluxed forward, sized, and punched in the appropriate amount of external rotation and mechanical alignment was verified using a drop benjamin. Trials were placed, the knee was reduced with a trial insert, it was taken through a range of motion, and found to be stable in the varus/valgus plane 0-30 degrees of flexion and the AP plane at 90 degrees of flexion. Attention was then turned to the patella. A symmetric resection was performed to recreate native patella height and appropriately sized. All extraneous osteophyte and synovium were removed.  With a trial in place, the patella tracked centrally using the no thumbs technique. All trial components were removed. The real components were impacted with a great press-fit.  The tourniquet was let down and hemostasis ensured. The real insert was placed. Stability and patellar tracking were excellent. The knee was then copiously irrigated. One gram of Vancomycin was left in the wound.  The arthrotomy  was closed with number 2 running barbed suture, and the wound was closed in layers. An occlusive silver dressing was applied and the patient was turned over to anesthesia in stable condition without any apparent intraoperative complications.    Plan:  WBAT with a walker (which will need to be provided if they do not already have one due to weakness, imbalance, and fall risk), PT/OT evaluation, Elaquis for DVT prophylaxis, Leave dressing in place until followup.

## 2024-06-18 NOTE — ANESTHESIA POSTPROCEDURE EVALUATION
Patient: Sanjeev Charles    Procedure Summary       Date: 06/18/24 Room / Location:  OR 06 / SURGERY [facility]    Anesthesia Start: 1133 Anesthesia Stop: 1254    Procedure: ROBOTIC RIGHT TOTAL KNEE ARTHROPLASTY (Right: Knee) Diagnosis:       Primary osteoarthritis of right knee      (Primary osteoarthritis of right knee [M17.11])    Surgeons: Parviz Almonte M.D. Responsible Provider: Nadeem Romano M.D.    Anesthesia Type: general, peripheral nerve block ASA Status: 3            Final Anesthesia Type: general, peripheral nerve block  Last vitals  BP   Blood Pressure: (!) 163/100, NIBP: (!) 162/97    Temp   36.1 °C (97 °F)    Pulse   78   Resp   16    SpO2   93 %      Anesthesia Post Evaluation    Patient location during evaluation: PACU  Patient participation: complete - patient participated  Level of consciousness: awake and alert    Airway patency: patent  Anesthetic complications: no  Cardiovascular status: hemodynamically stable  Respiratory status: acceptable  Hydration status: euvolemic    PONV: none          There were no known notable events for this encounter.     Nurse Pain Score: 3 (NPRS)

## 2024-06-18 NOTE — ANESTHESIA TIME REPORT
Anesthesia Start and Stop Event Times       Date Time Event    6/18/2024 1130 Ready for Procedure     1133 Anesthesia Start     1254 Anesthesia Stop          Responsible Staff  06/18/24      Name Role Begin End    Nadeem Romano M.D. Anesth 1133 1254          Overtime Reason:  no overtime (within assigned shift)    Comments:

## 2024-06-18 NOTE — H&P
Surgery Orthopedic History & Physical Note    Date  6/18/2024    Primary Care Physician  Feliciano Vasquez M.D.    CC  Pre-Op Diagnosis Codes:     * Primary osteoarthritis of right knee [M17.11]    HPI  This is a 58 y.o. male who presents for a TKA.    Past Medical History:   Diagnosis Date    Alcohol abuse 11/8/2021    Allergy     Anxiety 6/11/2019    Asthma     Bronchitis     Chronic hepatitis C without hepatic coma (HCC) 6/11/2019    Clotting disorder (HCC)     1967 brain clot as child    Depression     Dyslipidemia 5/6/2019    HTN (hypertension)     Hypertension     Irregular heart beat 5/6/2019    Pneumonia     Sleep apnea        Past Surgical History:   Procedure Laterality Date    CRANIOTOMY BRAIN LAB  1967    APPENDECTOMY      OTHER NEUROLOGICAL SURG      removal of brain tumor    OTHER ORTHOPEDIC SURGERY      surgery to bilat knees       Current Facility-Administered Medications   Medication Dose Route Frequency Provider Last Rate Last Admin    acetaminophen (Tylenol) tablet 1,000 mg  1,000 mg Oral Once Nadeem Romano M.D.        ceFAZolin (Ancef) injection 2 g  2 g Intravenous Once Parviz Almonte M.D.        lidocaine (Xylocaine) 1 % injection 0.5 mL  0.5 mL Intradermal Once PRN Parviz Almonte M.D.        lactated ringers infusion   Intravenous Continuous Parviz Almonte M.D.           Social History     Socioeconomic History    Marital status:      Spouse name: Not on file    Number of children: Not on file    Years of education: Not on file    Highest education level: Not on file   Occupational History    Not on file   Tobacco Use    Smoking status: Former     Current packs/day: 0.25     Average packs/day: 0.3 packs/day for 20.0 years (5.0 ttl pk-yrs)     Types: Cigarettes     Passive exposure: Never    Smokeless tobacco: Current     Types: Chew    Tobacco comments:     Dip coppenhagen for many years.   Vaping Use    Vaping status: Former    Substances: THC   Substance and Sexual Activity    Alcohol use:  Yes     Alcohol/week: 3.6 oz     Types: 6 Cans of beer per week     Comment: daily, 2 drinks    Drug use: Not Currently     Types: Marijuana     Comment: Smoke    Sexual activity: Yes     Partners: Female     Comment: LIves with wife and 2 kids. 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   Other Topics Concern    Not on file   Social History Narrative    Not on file     Social Determinants of Health     Financial Resource Strain: Not on file   Food Insecurity: Not on file   Transportation Needs: Not on file   Physical Activity: Not on file   Stress: Not on file   Social Connections: Not on file   Intimate Partner Violence: Unknown (4/19/2023)    Received from [facility] Safety Screener     SDOH Safety Safe where you live Question: Not on file     SDOH Safety Physically hurt Question: Not on file     SDOH Safety Emotionally abused Question: Not on file   Housing Stability: Not on file       Family History   Problem Relation Age of Onset    Lung Disease Mother         COPD    Heart Disease Mother         CHF/Pacemaker    Cancer Mother         breast cancer    Heart Disease Father     Stroke Father     Cancer Sister         berast cancer       Allergies  Patient has no known allergies.    Review of Systems  Negative    Physical Exam  Regular rate and rhythm  Nonlabored breathing  Abdomen soft and nontender   Neurovascular intact distally  Skin is in good condition    Vital Signs  Blood Pressure: (!) 163/100   Temperature: 36.6 °C (97.9 °F)   Pulse: 81   Respiration: 20   Pulse Oximetry: 97 %       Labs:                    Radiology:  No orders to display         Assessment/Plan:  Pre-Op Diagnosis Codes:     * Primary osteoarthritis of right knee [M17.11]  Procedure(s):  ROBOTIC RIGHT TOTAL KNEE ARTHROPLASTY

## 2024-06-18 NOTE — ANESTHESIA PROCEDURE NOTES
Airway    Date/Time: 6/18/2024 11:42 AM    Performed by: Nadeem Romano M.D.  Authorized by: Nadeem Romano M.D.    Location:  OR  Urgency:  Elective  Indications for Airway Management:  Anesthesia      Spontaneous Ventilation: absent    Sedation Level:  Deep  Preoxygenated: Yes    Final Airway Type:  Supraglottic airway  Final Supraglottic Airway:  Standard LMA    SGA Size:  5  Number of Attempts at Approach:  1        
Peripheral Block    Date/Time: 6/18/2024 11:26 AM    Performed by: Nadeem Romano M.D.  Authorized by: Nadeem Romano M.D.    Patient Location:  Pre-op  Start Time:  6/18/2024 11:26 AM  End Time:  6/18/2024 11:29 AM  Reason for Block: at surgeon's request and post-op pain management ONLY    patient identified, IV checked, site marked, risks and benefits discussed, surgical consent, monitors and equipment checked, pre-op evaluation and timeout performed    Patient Position:  Supine  Prep: ChloraPrep    Monitoring:  Heart rate, continuous pulse ox and cardiac monitor  Block Region:  Lower Extremity  Lower Extremity - Block Type:  Selective FEMORAL nerve block at the Adductor Canal    Laterality:  Right  Procedures: ultrasound guided  Image captured, interpreted and electronically stored.  Local Infiltration:  Lidocaine  Strength:  1 %  Dose:  3 ml  Block Type:  Single-shot  Needle Length:  100mm  Needle Gauge:  21 G  Needle Localization:  Ultrasound guidance  Ultrasound picture in chart  Injection Assessment:  Negative aspiration for heme, no paresthesia on injection, incremental injection and local visualized surrounding nerve on ultrasound  Evidence of intravascular injection: No     US Guided Selective Femoral Nerve Block at Adductor Canal:   US probe placed at mid-thigh level on externally rotated leg and femur identified.  Probe directed medially until Sartorius Muscle (SM), Femoral Artery (FA) and Saphenous Nerve (SN) identified in Adductor Canal (AC).  Needle inserted anterolateral to probe in an in plane approach into a subsartorial perivascular perineural position.  After negative aspiration LA injected with ease and visualized spreading within the AC.       
55

## 2024-06-18 NOTE — ANESTHESIA PREPROCEDURE EVALUATION
Case: [id number] Date/Time: 06/18/24 1145    Procedure: ROBOTIC RIGHT TOTAL KNEE ARTHROPLASTY    Diagnosis: Primary osteoarthritis of right knee [M17.11]    Pre-op diagnosis: Primary osteoarthritis of right knee [M17.11]    Location:  OR 06 / SURGERY [facility]    Surgeons: Parviz Almonte M.D.          58 y.o.  male     Chronic afib, rate controlled, no CP or dyspnea.  Stopped metoprol and eliquis a week ago due to unclear instructions per patient.  /100 today, elevated by acceptable.  Discussed that medications are for risk reduction of CVA from afib and that stopping medications places him at higher risk.  Patient accepts risks and wishes to proceed.   Advised patient of importance of staying compliant with medications post op.  Discussed with surgical team.    MARCELA - CPAP    Denies anesthesia problems    NPO    Allergies:  Patient has no known allergies.     Past Medical History:   Diagnosis Date    Alcohol abuse 11/8/2021    Allergy     Anxiety 6/11/2019    Asthma     Bronchitis     Chronic hepatitis C without hepatic coma (HCC) 6/11/2019    Clotting disorder (HCC)     1967 brain clot as child    Depression     Dyslipidemia 5/6/2019    HTN (hypertension)     Hypertension     Irregular heart beat 5/6/2019    Pneumonia     Sleep apnea         Social History     Socioeconomic History    Marital status:      Spouse name: Not on file    Number of children: Not on file    Years of education: Not on file    Highest education level: Not on file   Occupational History    Not on file   Tobacco Use    Smoking status: Former     Current packs/day: 0.25     Average packs/day: 0.3 packs/day for 20.0 years (5.0 ttl pk-yrs)     Types: Cigarettes     Passive exposure: Never    Smokeless tobacco: Current     Types: Chew    Tobacco comments:     Dip coppenhagen for many years.   Vaping Use    Vaping status: Former    Substances: THC   Substance and Sexual Activity    Alcohol use: Yes     Alcohol/week: 3.6 oz      Types: 6 Cans of beer per week     Comment: daily, 2 drinks    Drug use: Not Currently     Types: Marijuana     Comment: Smoke    Sexual activity: Yes     Partners: Female     Comment: LIves with wife and 2 kids. 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   Other Topics Concern    Not on file   Social History Narrative    Not on file     Social Determinants of Health     Financial Resource Strain: Not on file   Food Insecurity: Not on file   Transportation Needs: Not on file   Physical Activity: Not on file   Stress: Not on file   Social Connections: Not on file   Intimate Partner Violence: Unknown (4/19/2023)    Received from [facility] Safety Screener     SDOH Safety Safe where you live Question: Not on file     SDOH Safety Physically hurt Question: Not on file     SDOH Safety Emotionally abused Question: Not on file   Housing Stability: Not on file        Past Surgical History:   Procedure Laterality Date    CRANIOTOMY BRAIN LAB  1967    APPENDECTOMY      OTHER NEUROLOGICAL SURG      removal of brain tumor    OTHER ORTHOPEDIC SURGERY      surgery to bilat knees        Lab Results   Component Value Date/Time    SODIUM 140 06/18/2024 10:28 AM    POTASSIUM 3.4 (L) 06/18/2024 10:28 AM    CHLORIDE 106 06/18/2024 10:28 AM    CO2 22 06/18/2024 10:28 AM    GLUCOSE 103 (H) 06/18/2024 10:28 AM    BUN 12 06/18/2024 10:28 AM    CREATININE 0.84 06/18/2024 10:28 AM    BUNCREATRAT 15 03/27/2024 07:18 AM         Lab Results   Component Value Date/Time    WBC 9.3 04/18/2024 11:05 AM    RBC 4.71 04/18/2024 11:05 AM    HEMOGLOBIN 15.4 04/18/2024 11:05 AM    HEMATOCRIT 44.3 04/18/2024 11:05 AM    MCV 94.1 04/18/2024 11:05 AM    MCH 32.7 04/18/2024 11:05 AM    MCHC 34.8 04/18/2024 11:05 AM    MPV 10.1 04/18/2024 11:05 AM    NEUTSPOLYS 46.50 06/21/2021 06:47 PM    LYMPHOCYTES 41.60 (H) 06/21/2021 06:47 PM    MONOCYTES 7.40 06/21/2021 06:47 PM    EOSINOPHILS 3.40 06/21/2021 06:47 PM     BASOPHILS 0.60 2021 06:47 PM         Results for orders placed or performed in visit on 22   EKG   Result Value Ref Range    Report       [facility] [place] [facility]    Test Date:  2022  Pt Name:    LAUREN MANE                Department: [facility]  MRN:        [id number]                      Room:  Gender:     Male                         Technician:   :        1965                   Requested By:VIV CERVANTES  Order #:    [id number]                    Reading MD: Anton Lucas MD    Measurements  Intervals                                Axis  Rate:       90                           P:  NM:                                      QRS:        72  QRSD:       107                          T:          54  QT:         379  QTc:        464    Interpretive Statements  Atrial fibrillation  Borderline low voltage, extremity leads  Anteroseptal infarct, old  Electronically Signed On 2022 17:30:28 PST by Anton Lucas MD            Relevant Problems   ANESTHESIA   (positive) Sleep apnea      CARDIAC   (positive) Atrial fibrillation (HCC)   (positive) Essential hypertension       Physical Exam    Airway   Mallampati: II  TM distance: >3 FB  Neck ROM: full       Cardiovascular - normal exam  Rhythm: regular  Rate: normal     Dental - normal exam           Pulmonary - normal exam     Abdominal    Neurological - normal exam                   Anesthesia Plan    ASA 3   ASA physical status 3 criteria: a thrombophilic disease requiring anticoagulation    Plan - general and peripheral nerve block     Peripheral nerve block will be post-op pain control  Airway plan will be LMA          Induction: intravenous    Postoperative Plan: Postoperative administration of opioids is intended.    Pertinent diagnostic labs and testing reviewed    Informed Consent:    Anesthetic plan and risks discussed with patient.    Use of blood products discussed with: patient whom consented to blood products.

## 2024-06-18 NOTE — OR NURSING
1252: To PACU from OR via gurney, sleeping, respirations spontaneous and non-labored. Icepack applied over c/d/i R knee surgical dressings. R foot pink/warm with brisk CRF. Plan to keep pt in PACU for full hour per [facility] protocol - pt did not bring CPAP today. Pillow placed under operative lower extremity ankle only, knee of gurney flat.  1300: Rouses to name, HOB elevated, DB+C, denies pain/nausea. O2 weaning commenced.  1310: More awake, O2 d/martha, tolerates po water, now sipping po soda.  1320: Given IS and instructed on use with goal of 3250cc, pt currently inhaling volumes of approx 3000cc.   1335: no change  1352: No change in surgical site assessment.Patient stood at side of bed with CNA assist and walker for safety. Patient marched in place. Patient has adequate lower extremity function to participate in PT in Stage 2  1353: Ambulated with CNA to Stage 2.

## 2024-06-18 NOTE — THERAPY
Physical Therapy   Initial Evaluation     Patient Name: Sanjeev Charles  Age:  58 y.o., Sex:  male  Medical Record #: [id number]  Today's Date: 6/18/2024     Precautions  Precautions: Weight Bearing As Tolerated Right Lower Extremity    Assessment  Patient is 58 y.o. male s/p right TKA POD #0.  Pt agreeable to therapy evaluation. Pt is able to ambulate safely with FWW, stair training completed. Pt was provided with education on elevation, icing, positioning, and supine/seated therapeutic exercises. HEP handout issued, pt able to return demo all exercises.  Pt has support at home and has no further acute skilled PT needs at this time. Anticipate pt to d/c home once medically clear with recs for FWW use and OP therapy services.     Plan    Physical Therapy Initial Treatment Plan   Duration: Evaluation only    DC Equipment Recommendations: Raised Toilet Seat with Arms  Discharge Recommendations: Recommend outpatient physical therapy services to address higher level deficits        06/18/24 1505   Prior Living Situation   Housing / Facility 1 Story House   Steps Into Home 2  (platform steps)   Bathroom Set up Bathtub / Shower Combination   Equipment Owned Front-Wheel Walker   Lives with - Patient's Self Care Capacity Spouse   Comments supportive spouse   Prior Level of Functional Mobility   Bed Mobility Independent   Transfer Status Independent   Ambulation Independent   Assistive Devices Used None   Stairs Independent   Cognition    Cognition / Consciousness WDL   Passive ROM Lower Body   Passive ROM Lower Body X   Comments R knee 0-90 deg   Active ROM Lower Body    Active ROM Lower Body  X   Comments R knee 0-80 deg   Strength Lower Body   Lower Body Strength  X   Comments R knee limited by pain   Sensation Lower Body   Lower Extremity Sensation   WDL   Balance Assessment   Sitting Balance (Static) Good   Sitting Balance (Dynamic) Fair +   Standing Balance (Static) Fair +   Standing Balance (Dynamic) Fair   Weight  Shift Sitting Good   Weight Shift Standing Fair   Comments stdg with FWW   Bed Mobility    Supine to Sit   (NT, pt sitting up in chair)   Gait Analysis   Gait Level Of Assist Standby Assist   Assistive Device Front Wheel Walker   Distance (Feet) 80   # of Times Distance was Traveled 1   Deviation Decreased Heel Strike;Decreased Toe Off   # of Stairs Climbed 1   Level of Assist with Stairs Contact Guard Assist   Weight Bearing Status WBAT R LE   Functional Mobility   Sit to Stand Standby Assist   Bed, Chair, Wheelchair Transfer Standby Assist

## 2024-06-18 NOTE — LETTER
June 12, 2024    Patient Name: Sanjeev Charles  Surgeon Name: Parviz Almonte M.D.  Surgery Facility: [facility] ([address] [place])  Surgery Date: 6/18/2024    The time of your surgery is not final and may change up to and until the day of your surgery. You will be contacted 24-48 hours prior to your surgery date with your check-in and surgery time.    If you will not be at one of the below numbers please call the surgery scheduler at [phone]  Preferred Phone: [phone]    BEFORE YOUR SURGERY   Pre Registration and/or Lab Work must be done within and no earlier than 28 days prior to your surgery date. Your scheduled facility will contact you regarding all required preregistration and/or lab work. If you have not been contacted within 7 days of your scheduled procedure please call [facility] at [phone] for an appointment as soon as possible.    DAY OF YOUR SURGERY  Nothing to eat or drink after midnight     Refrain from smoking any substance after midnight prior to surgery. Smoking may interfere with the anesthetic and frequently produces nausea during the recovery period.    Continue taking all lifesaving medications. Including the morning of your surgery with small sip of water.    Please do NOT take on the day of surgery:  Diuretics: examples- furosemide (Lasix), spironolactone, hydrochlorothiazide  ACE-inhibitors: examples- lisinopril, ramipril, enalapril  “ARBs”: examples- losartan, Olmesartan, valsartan    Please arrive at the hospital/surgery center at the check-in time provided.     An adult will need to bring you and take you home after your surgery.     AFTER YOUR SURGERY  Post op Appointment:   Date: 07/03/2024   Time: 10:00AM   With: Everett Jean PA-C   Location: [address] [place] [address] [place] [address]    - Therapy- Your first appointment should be 4-5  day(s) after your surgery. For your convenience we have 4 Physical Therapy  locations: [facility], [place], and [facility]. Call our office ASAP to schedule an appointment at [phone] or take the enclosed Therapy Prescription to a facility of your choice.  - No dental work for 3-6 months after your surgery.  - Post Surgery - You will need someone to drive you home  - Post Surgery - You will need someone to stay with you for 24 hours  - You must have someone provide transportation post surgery and someone to monitor you for at least 24 hours post-surgery. If you don't have either of these your appointment will be canceled.     TIME OFF WORK  FMLA or Disability forms can be faxed directly to: [phone] or you may drop them off at [address] [place] [address]. Our office charges a $35.00 fee per form. Forms will be completed within 10 business days of drop off and payment received. For the status of your forms you may contact our disability office directly [phone].    MEDICATION INSTRUCTIONS **Please read section completely**  The following medications should be stopped a minimum of 10 days prior to surgery:  All over the counter, Supplements & Herbal medications    Anorectics: Phentermine (Adipex-P, Lomaira and Suprenza), Phentermine-topiramate (Qsymia), Bupropion-naltrexone (Contrave)    **If you are on Bupropion for anxiety/depression, please continue this medication up until the day of surgery.     Opiod Partial Agonists/Opioid Antagonists: Buprenorphine (Suboxone, Belbuca, Butrans, Probuphine Implant, Sublocade), Naltrexone (ReVia, Vivitrol), Naloxone    Amphetamines: Dextroamphetamine/Amphetamine (Adderall, Mydayis), Methylphenidate Hydrochloride (Concerta, Metadate, Methylin, Ritalin)    The following medications should be stopped 5 days prior to surgery:  Blood Thinners: Any Aspirin, Aspirin products, anti-inflammatories such as ibuprofen and any blood thinners such as Coumadin and Plavix. Please consult your prescribing physician if you are on life  saving blood thinners, in regards to when to stop medications prior to surgery.     The following medications should be stopped a minimum of 3 days prior to surgery:  PDE-5 inhibitors: Sildenafil (Viagra), Tadalafil (Cialis), Vardenafil (Levitra), Avanafil (Stendra)    MAO Inhibitors: Rasagiline (Azilect), Selegiline (Eldepryl, Emsam, Selapar), Isocarboxazid (Marplan), Phenelzine (Nardil)

## 2024-06-19 RX ORDER — DISULFIRAM 250 MG/1
250 TABLET ORAL DAILY
Qty: 30 TABLET | Refills: 0 | Status: CANCELLED | OUTPATIENT
Start: 2024-06-19

## 2024-06-19 RX ORDER — DISULFIRAM 250 MG/1
250 TABLET ORAL DAILY
Qty: 30 TABLET | Refills: 3 | Status: SHIPPED | OUTPATIENT
Start: 2024-06-19

## 2024-06-19 RX ORDER — APIXABAN 5 MG/1
5 TABLET, FILM COATED ORAL DAILY
Qty: 90 TABLET | Refills: 0 | Status: CANCELLED | OUTPATIENT
Start: 2024-06-19

## 2024-06-19 RX ORDER — APIXABAN 5 MG/1
5 TABLET, FILM COATED ORAL 2 TIMES DAILY
Qty: 60 TABLET | Refills: 5 | Status: SHIPPED | OUTPATIENT
Start: 2024-06-19 | End: 2024-06-20

## 2024-06-19 NOTE — TELEPHONE ENCOUNTER
Received request via: Patient    Was the patient seen in the last year in this department? Yes    Does the patient have an active prescription (recently filled or refills available) for medication(s) requested? No    Pharmacy Name: [facility]    Does the patient have long-term Plus and need 100 day supply (blood pressure, diabetes and cholesterol meds only)? Patient does not have SCP

## 2024-06-20 RX ORDER — APIXABAN 5 MG/1
5 TABLET, FILM COATED ORAL 2 TIMES DAILY
Qty: 60 TABLET | Refills: 5 | Status: SHIPPED | OUTPATIENT
Start: 2024-06-20

## 2024-07-29 ENCOUNTER — TELEPHONE (OUTPATIENT)
Dept: MEDICAL GROUP | Facility: PHYSICIAN GROUP | Age: 59
End: 2024-07-29
Payer: COMMERCIAL

## 2024-07-29 RX ORDER — FUROSEMIDE 20 MG/1
20 TABLET ORAL DAILY
Qty: 90 TABLET | Refills: 0 | Status: CANCELLED | OUTPATIENT
Start: 2024-07-29

## 2024-07-29 RX ORDER — FUROSEMIDE 20 MG/1
20 TABLET ORAL DAILY
Qty: 90 TABLET | Refills: 0 | Status: SHIPPED | OUTPATIENT
Start: 2024-07-29

## 2024-08-29 PROBLEM — M17.12 PRIMARY OSTEOARTHRITIS OF LEFT KNEE: Status: ACTIVE | Noted: 2024-08-21

## 2024-09-09 ENCOUNTER — APPOINTMENT (OUTPATIENT)
Dept: ADMISSIONS | Facility: MEDICAL CENTER | Age: 59
End: 2024-09-09
Attending: ORTHOPAEDIC SURGERY
Payer: COMMERCIAL

## 2024-09-09 DIAGNOSIS — F41.9 ANXIETY: Chronic | ICD-10-CM

## 2024-09-09 DIAGNOSIS — I10 ESSENTIAL HYPERTENSION: Chronic | ICD-10-CM

## 2024-09-09 RX ORDER — DISULFIRAM 250 MG/1
250 TABLET ORAL DAILY
Qty: 30 TABLET | Refills: 3 | Status: SHIPPED | OUTPATIENT
Start: 2024-09-09 | End: 2024-09-14 | Stop reason: SDUPTHER

## 2024-09-09 RX ORDER — METOPROLOL SUCCINATE 50 MG/1
50 TABLET, EXTENDED RELEASE ORAL EVERY EVENING
Qty: 100 TABLET | Refills: 3 | Status: SHIPPED | OUTPATIENT
Start: 2024-09-09

## 2024-09-09 NOTE — TELEPHONE ENCOUNTER
Received request via: Patient    Was the patient seen in the last year in this department? Yes    Does the patient have an active prescription (recently filled or refills available) for medication(s) requested? No    Does the patient have MCFP Plus and need 100-day supply? (This applies to ALL medications) Patient does not have SCP

## 2024-09-11 ENCOUNTER — HOSPITAL ENCOUNTER (OUTPATIENT)
Dept: RADIOLOGY | Facility: MEDICAL CENTER | Age: 59
End: 2024-09-11
Attending: ORTHOPAEDIC SURGERY
Payer: COMMERCIAL

## 2024-09-11 DIAGNOSIS — M17.32 POST-TRAUMATIC OSTEOARTHRITIS OF LEFT KNEE: ICD-10-CM

## 2024-09-11 PROCEDURE — 73700 CT LOWER EXTREMITY W/O DYE: CPT | Mod: LT

## 2024-09-13 ENCOUNTER — APPOINTMENT (OUTPATIENT)
Dept: ADMISSIONS | Facility: MEDICAL CENTER | Age: 59
End: 2024-09-13
Attending: ORTHOPAEDIC SURGERY
Payer: COMMERCIAL

## 2024-09-16 RX ORDER — DISULFIRAM 250 MG/1
250 TABLET ORAL DAILY
Qty: 30 TABLET | Refills: 3 | Status: SHIPPED | OUTPATIENT
Start: 2024-09-16

## 2024-09-16 NOTE — TELEPHONE ENCOUNTER
Received request via: Patient    Was the patient seen in the last year in this department? Yes    Does the patient have an active prescription (recently filled or refills available) for medication(s) requested? No    Pharmacy Name: [facility] [address] [place] [address] [facility]        Does the patient have FDC Plus and need 100-day supply? (This applies to ALL medications) Patient does not have SCP

## 2024-09-19 ENCOUNTER — PRE-ADMISSION TESTING (OUTPATIENT)
Dept: ADMISSIONS | Facility: MEDICAL CENTER | Age: 59
End: 2024-09-19
Attending: ORTHOPAEDIC SURGERY
Payer: COMMERCIAL

## 2024-09-19 VITALS — BODY MASS INDEX: 27.9 KG/M2 | HEIGHT: 75 IN

## 2024-09-19 DIAGNOSIS — Z01.810 PRE-OPERATIVE CARDIOVASCULAR EXAMINATION: ICD-10-CM

## 2024-09-19 DIAGNOSIS — Z01.812 PRE-OPERATIVE LABORATORY EXAMINATION: ICD-10-CM

## 2024-09-19 LAB
ALBUMIN SERPL BCP-MCNC: 3.8 G/DL (ref 3.2–4.9)
ALBUMIN/GLOB SERPL: 1.5 G/DL
ALP SERPL-CCNC: 130 U/L (ref 30–99)
ALT SERPL-CCNC: 8 U/L (ref 2–50)
ANION GAP SERPL CALC-SCNC: 10 MMOL/L (ref 7–16)
AST SERPL-CCNC: 12 U/L (ref 12–45)
BILIRUB SERPL-MCNC: 0.5 MG/DL (ref 0.1–1.5)
BUN SERPL-MCNC: 13 MG/DL (ref 8–22)
CALCIUM ALBUM COR SERPL-MCNC: 9.3 MG/DL (ref 8.5–10.5)
CALCIUM SERPL-MCNC: 9.1 MG/DL (ref 8.4–10.2)
CHLORIDE SERPL-SCNC: 104 MMOL/L (ref 96–112)
CO2 SERPL-SCNC: 22 MMOL/L (ref 20–33)
CREAT SERPL-MCNC: 1.1 MG/DL (ref 0.5–1.4)
ERYTHROCYTE [DISTWIDTH] IN BLOOD BY AUTOMATED COUNT: 38.5 FL (ref 35.9–50)
EST. AVERAGE GLUCOSE BLD GHB EST-MCNC: 120 MG/DL
GFR SERPLBLD CREATININE-BSD FMLA CKD-EPI: 76 ML/MIN/1.73 M 2
GLOBULIN SER CALC-MCNC: 2.6 G/DL (ref 1.9–3.5)
GLUCOSE SERPL-MCNC: 92 MG/DL (ref 65–99)
HBA1C MFR BLD: 5.8 % (ref 4–5.6)
HCT VFR BLD AUTO: 39.8 % (ref 42–52)
HGB BLD-MCNC: 13.8 G/DL (ref 14–18)
MCH RBC QN AUTO: 30.5 PG (ref 27–33)
MCHC RBC AUTO-ENTMCNC: 34.7 G/DL (ref 32.3–36.5)
MCV RBC AUTO: 88.1 FL (ref 81.4–97.8)
PLATELET # BLD AUTO: 255 K/UL (ref 164–446)
PMV BLD AUTO: 9.8 FL (ref 9–12.9)
POTASSIUM SERPL-SCNC: 4.2 MMOL/L (ref 3.6–5.5)
PROT SERPL-MCNC: 6.4 G/DL (ref 6–8.2)
RBC # BLD AUTO: 4.52 M/UL (ref 4.7–6.1)
SCCMEC + MECA PNL NOSE NAA+PROBE: NEGATIVE
SCCMEC + MECA PNL NOSE NAA+PROBE: NEGATIVE
SODIUM SERPL-SCNC: 136 MMOL/L (ref 135–145)
WBC # BLD AUTO: 6.2 K/UL (ref 4.8–10.8)

## 2024-09-19 PROCEDURE — 80053 COMPREHEN METABOLIC PANEL: CPT

## 2024-09-19 PROCEDURE — 85027 COMPLETE CBC AUTOMATED: CPT

## 2024-09-19 PROCEDURE — 87640 STAPH A DNA AMP PROBE: CPT

## 2024-09-19 PROCEDURE — 36415 COLL VENOUS BLD VENIPUNCTURE: CPT

## 2024-09-19 PROCEDURE — 83036 HEMOGLOBIN GLYCOSYLATED A1C: CPT

## 2024-09-19 PROCEDURE — 93005 ELECTROCARDIOGRAM TRACING: CPT

## 2024-09-19 PROCEDURE — 87641 MR-STAPH DNA AMP PROBE: CPT

## 2024-09-19 ASSESSMENT — FIBROSIS 4 INDEX: FIB4 SCORE: 1.33

## 2024-09-20 LAB — EKG IMPRESSION: NORMAL

## 2024-09-20 PROCEDURE — 93010 ELECTROCARDIOGRAM REPORT: CPT | Performed by: INTERNAL MEDICINE

## 2024-09-23 RX ORDER — TRAMADOL HYDROCHLORIDE 50 MG/1
50-75 TABLET ORAL EVERY 6 HOURS PRN
Qty: 35 TABLET | Refills: 0 | Status: SHIPPED | OUTPATIENT
Start: 2024-09-23 | End: 2024-09-30

## 2024-09-23 RX ORDER — ENOXAPARIN SODIUM 100 MG/ML
100 INJECTION SUBCUTANEOUS EVERY 12 HOURS
Qty: 11 EACH | Refills: 0 | Status: SHIPPED | OUTPATIENT
Start: 2024-09-24 | End: 2024-09-30

## 2024-09-30 RX ORDER — ACETAMINOPHEN 500 MG
1000 TABLET ORAL ONCE
Status: CANCELLED | OUTPATIENT
Start: 2024-10-01 | End: 2024-10-01

## 2024-09-30 RX ORDER — CELECOXIB 200 MG/1
200 CAPSULE ORAL ONCE
Status: CANCELLED | OUTPATIENT
Start: 2024-10-01 | End: 2024-10-01

## 2024-10-01 ENCOUNTER — ANESTHESIA (OUTPATIENT)
Dept: SURGERY | Facility: MEDICAL CENTER | Age: 59
End: 2024-10-01
Payer: COMMERCIAL

## 2024-10-01 ENCOUNTER — HOSPITAL ENCOUNTER (OUTPATIENT)
Facility: MEDICAL CENTER | Age: 59
End: 2024-10-01
Attending: ORTHOPAEDIC SURGERY | Admitting: ORTHOPAEDIC SURGERY
Payer: COMMERCIAL

## 2024-10-01 ENCOUNTER — ANESTHESIA EVENT (OUTPATIENT)
Dept: SURGERY | Facility: MEDICAL CENTER | Age: 59
End: 2024-10-01
Payer: COMMERCIAL

## 2024-10-01 VITALS
WEIGHT: 221.67 LBS | SYSTOLIC BLOOD PRESSURE: 96 MMHG | HEART RATE: 87 BPM | BODY MASS INDEX: 27.56 KG/M2 | RESPIRATION RATE: 16 BRPM | TEMPERATURE: 97 F | HEIGHT: 75 IN | OXYGEN SATURATION: 93 % | DIASTOLIC BLOOD PRESSURE: 60 MMHG

## 2024-10-01 DIAGNOSIS — G89.18 POST-OPERATIVE PAIN: ICD-10-CM

## 2024-10-01 DIAGNOSIS — M17.12 PRIMARY OSTEOARTHRITIS OF LEFT KNEE: ICD-10-CM

## 2024-10-01 PROCEDURE — 700101 HCHG RX REV CODE 250: Performed by: ORTHOPAEDIC SURGERY

## 2024-10-01 PROCEDURE — 700101 HCHG RX REV CODE 250: Performed by: ANESTHESIOLOGY

## 2024-10-01 PROCEDURE — 160042 HCHG SURGERY MINUTES - EA ADDL 1 MIN LEVEL 5: Performed by: ORTHOPAEDIC SURGERY

## 2024-10-01 PROCEDURE — 502000 HCHG MISC OR IMPLANTS RC 0278: Performed by: ORTHOPAEDIC SURGERY

## 2024-10-01 PROCEDURE — 27447 TOTAL KNEE ARTHROPLASTY: CPT | Mod: LT | Performed by: ORTHOPAEDIC SURGERY

## 2024-10-01 PROCEDURE — 700105 HCHG RX REV CODE 258: Performed by: ANESTHESIOLOGY

## 2024-10-01 PROCEDURE — 700111 HCHG RX REV CODE 636 W/ 250 OVERRIDE (IP)

## 2024-10-01 PROCEDURE — 97162 PT EVAL MOD COMPLEX 30 MIN: CPT

## 2024-10-01 PROCEDURE — 27447 TOTAL KNEE ARTHROPLASTY: CPT | Mod: ASROC,LT | Performed by: STUDENT IN AN ORGANIZED HEALTH CARE EDUCATION/TRAINING PROGRAM

## 2024-10-01 PROCEDURE — 160025 RECOVERY II MINUTES (STATS): Performed by: ORTHOPAEDIC SURGERY

## 2024-10-01 PROCEDURE — 160035 HCHG PACU - 1ST 60 MINS PHASE I: Performed by: ORTHOPAEDIC SURGERY

## 2024-10-01 PROCEDURE — 502714 HCHG ROBOTIC SURGERY SERVICES: Performed by: ORTHOPAEDIC SURGERY

## 2024-10-01 PROCEDURE — 160048 HCHG OR STATISTICAL LEVEL 1-5: Performed by: ORTHOPAEDIC SURGERY

## 2024-10-01 PROCEDURE — 20985 CPTR-ASST DIR MS PX: CPT | Mod: ASROC | Performed by: STUDENT IN AN ORGANIZED HEALTH CARE EDUCATION/TRAINING PROGRAM

## 2024-10-01 PROCEDURE — 160002 HCHG RECOVERY MINUTES (STAT): Performed by: ORTHOPAEDIC SURGERY

## 2024-10-01 PROCEDURE — C1776 JOINT DEVICE (IMPLANTABLE): HCPCS | Performed by: ORTHOPAEDIC SURGERY

## 2024-10-01 PROCEDURE — 700111 HCHG RX REV CODE 636 W/ 250 OVERRIDE (IP): Performed by: ORTHOPAEDIC SURGERY

## 2024-10-01 PROCEDURE — 160031 HCHG SURGERY MINUTES - 1ST 30 MINS LEVEL 5: Performed by: ORTHOPAEDIC SURGERY

## 2024-10-01 PROCEDURE — 20985 CPTR-ASST DIR MS PX: CPT | Performed by: ORTHOPAEDIC SURGERY

## 2024-10-01 PROCEDURE — 64447 NJX AA&/STRD FEMORAL NRV IMG: CPT | Performed by: ORTHOPAEDIC SURGERY

## 2024-10-01 PROCEDURE — 160036 HCHG PACU - EA ADDL 30 MINS PHASE I: Performed by: ORTHOPAEDIC SURGERY

## 2024-10-01 PROCEDURE — C1713 ANCHOR/SCREW BN/BN,TIS/BN: HCPCS | Performed by: ORTHOPAEDIC SURGERY

## 2024-10-01 PROCEDURE — 160009 HCHG ANES TIME/MIN: Performed by: ORTHOPAEDIC SURGERY

## 2024-10-01 PROCEDURE — 160046 HCHG PACU - 1ST 60 MINS PHASE II: Performed by: ORTHOPAEDIC SURGERY

## 2024-10-01 PROCEDURE — 700111 HCHG RX REV CODE 636 W/ 250 OVERRIDE (IP): Performed by: ANESTHESIOLOGY

## 2024-10-01 DEVICE — BASEPLATE TIBIAL TRIATHLON TRITANIUM SIZE 7 (1EA): Type: IMPLANTABLE DEVICE | Site: KNEE | Status: FUNCTIONAL

## 2024-10-01 DEVICE — IMPLANTABLE DEVICE: Type: IMPLANTABLE DEVICE | Site: KNEE | Status: FUNCTIONAL

## 2024-10-01 DEVICE — COMPONENT FEMORAL TRIATHLON CRUCIATE RETAIN LEFT SIZE 6 (1EA): Type: IMPLANTABLE DEVICE | Site: KNEE | Status: FUNCTIONAL

## 2024-10-01 RX ORDER — HYDROMORPHONE HYDROCHLORIDE 1 MG/ML
0.5 INJECTION, SOLUTION INTRAMUSCULAR; INTRAVENOUS; SUBCUTANEOUS
Status: CANCELLED | OUTPATIENT
Start: 2024-10-01

## 2024-10-01 RX ORDER — EPHEDRINE SULFATE 50 MG/ML
10 INJECTION, SOLUTION INTRAVENOUS
Status: CANCELLED | OUTPATIENT
Start: 2024-10-01

## 2024-10-01 RX ORDER — BISACODYL 10 MG
10 SUPPOSITORY, RECTAL RECTAL
Status: CANCELLED | OUTPATIENT
Start: 2024-10-01

## 2024-10-01 RX ORDER — DIPHENHYDRAMINE HYDROCHLORIDE 50 MG/ML
25 INJECTION INTRAMUSCULAR; INTRAVENOUS EVERY 6 HOURS PRN
Status: CANCELLED | OUTPATIENT
Start: 2024-10-01

## 2024-10-01 RX ORDER — TRANEXAMIC ACID 100 MG/ML
INJECTION, SOLUTION INTRAVENOUS PRN
Status: DISCONTINUED | OUTPATIENT
Start: 2024-10-01 | End: 2024-10-01 | Stop reason: SURG

## 2024-10-01 RX ORDER — DIPHENHYDRAMINE HCL 25 MG
25 TABLET ORAL NIGHTLY PRN
Status: CANCELLED | OUTPATIENT
Start: 2024-10-02

## 2024-10-01 RX ORDER — SODIUM CHLORIDE, SODIUM LACTATE, POTASSIUM CHLORIDE, CALCIUM CHLORIDE 600; 310; 30; 20 MG/100ML; MG/100ML; MG/100ML; MG/100ML
INJECTION, SOLUTION INTRAVENOUS
Status: DISCONTINUED | OUTPATIENT
Start: 2024-10-01 | End: 2024-10-01 | Stop reason: SURG

## 2024-10-01 RX ORDER — METOPROLOL SUCCINATE 25 MG/1
50 TABLET, EXTENDED RELEASE ORAL EVERY EVENING
Status: CANCELLED | OUTPATIENT
Start: 2024-10-01

## 2024-10-01 RX ORDER — ROPIVACAINE HYDROCHLORIDE 5 MG/ML
INJECTION, SOLUTION EPIDURAL; INFILTRATION; PERINEURAL
Status: COMPLETED | OUTPATIENT
Start: 2024-10-01 | End: 2024-10-01

## 2024-10-01 RX ORDER — HALOPERIDOL 5 MG/ML
1 INJECTION INTRAMUSCULAR EVERY 6 HOURS PRN
Status: CANCELLED | OUTPATIENT
Start: 2024-10-01

## 2024-10-01 RX ORDER — ACETAMINOPHEN 500 MG
1000 TABLET ORAL ONCE
Status: CANCELLED | OUTPATIENT
Start: 2024-10-01 | End: 2024-10-01

## 2024-10-01 RX ORDER — SODIUM CHLORIDE, SODIUM LACTATE, POTASSIUM CHLORIDE, CALCIUM CHLORIDE 600; 310; 30; 20 MG/100ML; MG/100ML; MG/100ML; MG/100ML
INJECTION, SOLUTION INTRAVENOUS CONTINUOUS
Status: CANCELLED | OUTPATIENT
Start: 2024-10-01

## 2024-10-01 RX ORDER — ALBUTEROL SULFATE 5 MG/ML
2.5 SOLUTION RESPIRATORY (INHALATION)
Status: CANCELLED | OUTPATIENT
Start: 2024-10-01

## 2024-10-01 RX ORDER — HYDROMORPHONE HYDROCHLORIDE 1 MG/ML
0.4 INJECTION, SOLUTION INTRAMUSCULAR; INTRAVENOUS; SUBCUTANEOUS
Status: CANCELLED | OUTPATIENT
Start: 2024-10-01

## 2024-10-01 RX ORDER — ASPIRIN 81 MG/1
81 TABLET ORAL 2 TIMES DAILY
Status: CANCELLED | OUTPATIENT
Start: 2024-10-01

## 2024-10-01 RX ORDER — HYDRALAZINE HYDROCHLORIDE 20 MG/ML
5 INJECTION INTRAMUSCULAR; INTRAVENOUS
Status: CANCELLED | OUTPATIENT
Start: 2024-10-01

## 2024-10-01 RX ORDER — DIPHENHYDRAMINE HYDROCHLORIDE 50 MG/ML
6.25 INJECTION INTRAMUSCULAR; INTRAVENOUS
Status: CANCELLED | OUTPATIENT
Start: 2024-10-01

## 2024-10-01 RX ORDER — KETOROLAC TROMETHAMINE 15 MG/ML
15 INJECTION, SOLUTION INTRAMUSCULAR; INTRAVENOUS EVERY 6 HOURS
Status: CANCELLED | OUTPATIENT
Start: 2024-10-01 | End: 2024-10-04

## 2024-10-01 RX ORDER — EPHEDRINE SULFATE 50 MG/ML
INJECTION, SOLUTION INTRAVENOUS PRN
Status: DISCONTINUED | OUTPATIENT
Start: 2024-10-01 | End: 2024-10-01 | Stop reason: SURG

## 2024-10-01 RX ORDER — MIDAZOLAM HYDROCHLORIDE 1 MG/ML
INJECTION INTRAMUSCULAR; INTRAVENOUS PRN
Status: DISCONTINUED | OUTPATIENT
Start: 2024-10-01 | End: 2024-10-01 | Stop reason: SURG

## 2024-10-01 RX ORDER — IBUPROFEN 400 MG/1
800 TABLET, FILM COATED ORAL 3 TIMES DAILY PRN
Status: CANCELLED | OUTPATIENT
Start: 2024-10-04

## 2024-10-01 RX ORDER — DOCUSATE SODIUM 100 MG/1
100 CAPSULE, LIQUID FILLED ORAL 2 TIMES DAILY
Status: CANCELLED | OUTPATIENT
Start: 2024-10-01

## 2024-10-01 RX ORDER — EPINEPHRINE 1 MG/ML(1)
AMPUL (ML) INJECTION
Status: DISCONTINUED | OUTPATIENT
Start: 2024-10-01 | End: 2024-10-01 | Stop reason: HOSPADM

## 2024-10-01 RX ORDER — OXYCODONE HCL 5 MG/5 ML
5 SOLUTION, ORAL ORAL
Status: CANCELLED | OUTPATIENT
Start: 2024-10-01

## 2024-10-01 RX ORDER — ONDANSETRON 2 MG/ML
INJECTION INTRAMUSCULAR; INTRAVENOUS PRN
Status: DISCONTINUED | OUTPATIENT
Start: 2024-10-01 | End: 2024-10-01 | Stop reason: SURG

## 2024-10-01 RX ORDER — VANCOMYCIN HYDROCHLORIDE 1 G/20ML
INJECTION, POWDER, LYOPHILIZED, FOR SOLUTION INTRAVENOUS
Status: COMPLETED | OUTPATIENT
Start: 2024-10-01 | End: 2024-10-01

## 2024-10-01 RX ORDER — TRAMADOL HYDROCHLORIDE 50 MG/1
50 TABLET ORAL EVERY 4 HOURS PRN
Status: CANCELLED | OUTPATIENT
Start: 2024-10-01

## 2024-10-01 RX ORDER — DIPHENHYDRAMINE HCL 25 MG
25 TABLET ORAL EVERY 6 HOURS PRN
Status: CANCELLED | OUTPATIENT
Start: 2024-10-01

## 2024-10-01 RX ORDER — AMLODIPINE BESYLATE 5 MG/1
10 TABLET ORAL EVERY EVENING
Status: CANCELLED | OUTPATIENT
Start: 2024-10-01

## 2024-10-01 RX ORDER — CEFAZOLIN SODIUM 1 G/3ML
2 INJECTION, POWDER, FOR SOLUTION INTRAMUSCULAR; INTRAVENOUS ONCE
Status: DISCONTINUED | OUTPATIENT
Start: 2024-10-01 | End: 2024-10-01 | Stop reason: HOSPADM

## 2024-10-01 RX ORDER — OXYCODONE HCL 5 MG/5 ML
10 SOLUTION, ORAL ORAL
Status: CANCELLED | OUTPATIENT
Start: 2024-10-01

## 2024-10-01 RX ORDER — ACETAMINOPHEN 500 MG
1000 TABLET ORAL EVERY 6 HOURS
Status: CANCELLED | OUTPATIENT
Start: 2024-10-01 | End: 2024-10-05

## 2024-10-01 RX ORDER — MEPERIDINE HYDROCHLORIDE 25 MG/ML
12.5 INJECTION INTRAMUSCULAR; INTRAVENOUS; SUBCUTANEOUS
Status: CANCELLED | OUTPATIENT
Start: 2024-10-01

## 2024-10-01 RX ORDER — DEXAMETHASONE SODIUM PHOSPHATE 4 MG/ML
4 INJECTION, SOLUTION INTRA-ARTICULAR; INTRALESIONAL; INTRAMUSCULAR; INTRAVENOUS; SOFT TISSUE
Status: CANCELLED | OUTPATIENT
Start: 2024-10-01

## 2024-10-01 RX ORDER — SODIUM CHLORIDE, SODIUM LACTATE, POTASSIUM CHLORIDE, CALCIUM CHLORIDE 600; 310; 30; 20 MG/100ML; MG/100ML; MG/100ML; MG/100ML
INJECTION, SOLUTION INTRAVENOUS CONTINUOUS
Status: CANCELLED | OUTPATIENT
Start: 2024-10-01 | End: 2024-10-01

## 2024-10-01 RX ORDER — ONDANSETRON 2 MG/ML
4 INJECTION INTRAMUSCULAR; INTRAVENOUS
Status: CANCELLED | OUTPATIENT
Start: 2024-10-01

## 2024-10-01 RX ORDER — HYDROMORPHONE HYDROCHLORIDE 1 MG/ML
0.2 INJECTION, SOLUTION INTRAMUSCULAR; INTRAVENOUS; SUBCUTANEOUS
Status: CANCELLED | OUTPATIENT
Start: 2024-10-01

## 2024-10-01 RX ORDER — SODIUM CHLORIDE 9 MG/ML
INJECTION, SOLUTION INTRAMUSCULAR; INTRAVENOUS; SUBCUTANEOUS
Status: DISCONTINUED | OUTPATIENT
Start: 2024-10-01 | End: 2024-10-01 | Stop reason: HOSPADM

## 2024-10-01 RX ORDER — OXYCODONE HYDROCHLORIDE 5 MG/1
5 TABLET ORAL
Status: CANCELLED | OUTPATIENT
Start: 2024-10-01

## 2024-10-01 RX ORDER — CELECOXIB 200 MG/1
200 CAPSULE ORAL ONCE
Status: CANCELLED | OUTPATIENT
Start: 2024-10-01 | End: 2024-10-02

## 2024-10-01 RX ORDER — ACETAMINOPHEN 10 MG/ML
1000 INJECTION, SOLUTION INTRAVENOUS EVERY 6 HOURS
Status: CANCELLED | OUTPATIENT
Start: 2024-10-01 | End: 2024-10-01

## 2024-10-01 RX ORDER — FUROSEMIDE 20 MG/1
20 TABLET ORAL DAILY
Status: CANCELLED | OUTPATIENT
Start: 2024-10-02

## 2024-10-01 RX ORDER — HYDROMORPHONE HYDROCHLORIDE 2 MG/ML
INJECTION, SOLUTION INTRAMUSCULAR; INTRAVENOUS; SUBCUTANEOUS PRN
Status: DISCONTINUED | OUTPATIENT
Start: 2024-10-01 | End: 2024-10-01 | Stop reason: SURG

## 2024-10-01 RX ORDER — AMOXICILLIN 250 MG
1 CAPSULE ORAL
Status: CANCELLED | OUTPATIENT
Start: 2024-10-01

## 2024-10-01 RX ORDER — AMOXICILLIN 250 MG
1 CAPSULE ORAL NIGHTLY
Status: CANCELLED | OUTPATIENT
Start: 2024-10-01

## 2024-10-01 RX ORDER — ONDANSETRON 2 MG/ML
4 INJECTION INTRAMUSCULAR; INTRAVENOUS EVERY 4 HOURS PRN
Status: CANCELLED | OUTPATIENT
Start: 2024-10-01

## 2024-10-01 RX ORDER — HYDROMORPHONE HYDROCHLORIDE 1 MG/ML
0.1 INJECTION, SOLUTION INTRAMUSCULAR; INTRAVENOUS; SUBCUTANEOUS
Status: CANCELLED | OUTPATIENT
Start: 2024-10-01

## 2024-10-01 RX ORDER — SODIUM PHOSPHATE,MONO-DIBASIC 19G-7G/118
1 ENEMA (ML) RECTAL
Status: CANCELLED | OUTPATIENT
Start: 2024-10-01

## 2024-10-01 RX ORDER — POLYETHYLENE GLYCOL 3350 17 G/17G
1 POWDER, FOR SOLUTION ORAL 2 TIMES DAILY PRN
Status: CANCELLED | OUTPATIENT
Start: 2024-10-01

## 2024-10-01 RX ORDER — LIDOCAINE HYDROCHLORIDE 20 MG/ML
INJECTION, SOLUTION EPIDURAL; INFILTRATION; INTRACAUDAL; PERINEURAL PRN
Status: DISCONTINUED | OUTPATIENT
Start: 2024-10-01 | End: 2024-10-01 | Stop reason: SURG

## 2024-10-01 RX ORDER — ROPIVACAINE HYDROCHLORIDE 5 MG/ML
INJECTION, SOLUTION EPIDURAL; INFILTRATION; PERINEURAL
Status: DISCONTINUED | OUTPATIENT
Start: 2024-10-01 | End: 2024-10-01 | Stop reason: HOSPADM

## 2024-10-01 RX ORDER — KETOROLAC TROMETHAMINE 30 MG/ML
INJECTION, SOLUTION INTRAMUSCULAR; INTRAVENOUS
Status: DISCONTINUED | OUTPATIENT
Start: 2024-10-01 | End: 2024-10-01 | Stop reason: HOSPADM

## 2024-10-01 RX ORDER — CEFAZOLIN SODIUM 1 G/3ML
INJECTION, POWDER, FOR SOLUTION INTRAMUSCULAR; INTRAVENOUS PRN
Status: DISCONTINUED | OUTPATIENT
Start: 2024-10-01 | End: 2024-10-01 | Stop reason: SURG

## 2024-10-01 RX ORDER — OXYCODONE HYDROCHLORIDE 10 MG/1
10 TABLET ORAL
Status: CANCELLED | OUTPATIENT
Start: 2024-10-01

## 2024-10-01 RX ORDER — ACETAMINOPHEN 500 MG
1000 TABLET ORAL EVERY 6 HOURS PRN
Status: CANCELLED | OUTPATIENT
Start: 2024-10-05

## 2024-10-01 RX ORDER — MUPIROCIN 20 MG/G
OINTMENT TOPICAL 2 TIMES DAILY
Status: CANCELLED | OUTPATIENT
Start: 2024-10-01 | End: 2024-10-06

## 2024-10-01 RX ORDER — SCOLOPAMINE TRANSDERMAL SYSTEM 1 MG/1
1 PATCH, EXTENDED RELEASE TRANSDERMAL
Status: CANCELLED | OUTPATIENT
Start: 2024-10-01

## 2024-10-01 RX ORDER — DEXAMETHASONE SODIUM PHOSPHATE 4 MG/ML
INJECTION, SOLUTION INTRA-ARTICULAR; INTRALESIONAL; INTRAMUSCULAR; INTRAVENOUS; SOFT TISSUE PRN
Status: DISCONTINUED | OUTPATIENT
Start: 2024-10-01 | End: 2024-10-01 | Stop reason: SURG

## 2024-10-01 RX ORDER — HALOPERIDOL 5 MG/ML
1 INJECTION INTRAMUSCULAR
Status: CANCELLED | OUTPATIENT
Start: 2024-10-01

## 2024-10-01 RX ADMIN — TRANEXAMIC ACID 1000 MG: 100 INJECTION, SOLUTION INTRAVENOUS at 09:32

## 2024-10-01 RX ADMIN — MIDAZOLAM HYDROCHLORIDE 2 MG: 2 INJECTION, SOLUTION INTRAMUSCULAR; INTRAVENOUS at 09:16

## 2024-10-01 RX ADMIN — SODIUM CHLORIDE, POTASSIUM CHLORIDE, SODIUM LACTATE AND CALCIUM CHLORIDE: 600; 310; 30; 20 INJECTION, SOLUTION INTRAVENOUS at 09:26

## 2024-10-01 RX ADMIN — HYDROMORPHONE HYDROCHLORIDE 0.5 MG: 2 INJECTION, SOLUTION INTRAMUSCULAR; INTRAVENOUS; SUBCUTANEOUS at 09:29

## 2024-10-01 RX ADMIN — CEFAZOLIN 2 G: 1 INJECTION, POWDER, FOR SOLUTION INTRAMUSCULAR; INTRAVENOUS at 09:29

## 2024-10-01 RX ADMIN — EPHEDRINE SULFATE 10 MG: 50 INJECTION, SOLUTION INTRAVENOUS at 09:39

## 2024-10-01 RX ADMIN — TRANEXAMIC ACID 1000 MG: 100 INJECTION, SOLUTION INTRAVENOUS at 10:05

## 2024-10-01 RX ADMIN — LIDOCAINE HYDROCHLORIDE 60 MG: 20 INJECTION, SOLUTION EPIDURAL; INFILTRATION; INTRACAUDAL at 09:29

## 2024-10-01 RX ADMIN — PROPOFOL 200 MG: 10 INJECTION, EMULSION INTRAVENOUS at 09:29

## 2024-10-01 RX ADMIN — ROPIVACAINE HYDROCHLORIDE 15 ML: 5 INJECTION EPIDURAL; INFILTRATION; PERINEURAL at 09:16

## 2024-10-01 RX ADMIN — EPHEDRINE SULFATE 10 MG: 50 INJECTION, SOLUTION INTRAVENOUS at 09:35

## 2024-10-01 RX ADMIN — DEXAMETHASONE SODIUM PHOSPHATE 8 MG: 4 INJECTION INTRA-ARTICULAR; INTRALESIONAL; INTRAMUSCULAR; INTRAVENOUS; SOFT TISSUE at 09:32

## 2024-10-01 RX ADMIN — EPHEDRINE SULFATE 10 MG: 50 INJECTION, SOLUTION INTRAVENOUS at 09:50

## 2024-10-01 RX ADMIN — ONDANSETRON 4 MG: 2 INJECTION INTRAMUSCULAR; INTRAVENOUS at 10:05

## 2024-10-01 ASSESSMENT — GAIT ASSESSMENTS
GAIT LEVEL OF ASSIST: SUPERVISED
DEVIATION: ANTALGIC;STEP TO
ASSISTIVE DEVICE: FRONT WHEEL WALKER
DISTANCE (FEET): 150

## 2024-10-01 ASSESSMENT — FIBROSIS 4 INDEX: FIB4 SCORE: .998268396969243564

## 2024-10-01 ASSESSMENT — COGNITIVE AND FUNCTIONAL STATUS - GENERAL
SUGGESTED CMS G CODE MODIFIER MOBILITY: CJ
MOBILITY SCORE: 22
WALKING IN HOSPITAL ROOM: A LITTLE
CLIMB 3 TO 5 STEPS WITH RAILING: A LITTLE

## 2024-10-01 ASSESSMENT — PAIN DESCRIPTION - PAIN TYPE: TYPE: SURGICAL PAIN

## 2024-10-14 RX ORDER — FUROSEMIDE 20 MG/1
20 TABLET ORAL DAILY
Qty: 90 TABLET | Refills: 0 | Status: SHIPPED | OUTPATIENT
Start: 2024-10-14

## 2024-10-14 RX ORDER — DISULFIRAM 250 MG/1
250 TABLET ORAL DAILY
Qty: 30 TABLET | Refills: 3 | Status: SHIPPED | OUTPATIENT
Start: 2024-10-14

## 2024-11-11 ENCOUNTER — TELEPHONE (OUTPATIENT)
Dept: MEDICAL GROUP | Facility: PHYSICIAN GROUP | Age: 59
End: 2024-11-11
Payer: COMMERCIAL

## 2024-11-11 DIAGNOSIS — F10.10 ALCOHOL ABUSE: Chronic | ICD-10-CM

## 2024-11-11 RX ORDER — DISULFIRAM 250 MG/1
250 TABLET ORAL DAILY
Qty: 30 TABLET | Refills: 3 | Status: SHIPPED | OUTPATIENT
Start: 2024-11-11

## 2024-11-11 NOTE — TELEPHONE ENCOUNTER
Received request via: Pharmacy    Was the patient seen in the last year in this department? Yes    Does the patient have an active prescription (recently filled or refills available) for medication(s) requested? No      Does the patient have longterm Plus and need 100-day supply? (This applies to ALL medications) Patient does not have SCP    Pharmacy comment: This is on backorder, change to alt. thanks.

## 2024-11-11 NOTE — TELEPHONE ENCOUNTER
Name: Sanjeev Charles    Message: Patient called and left a voicemail. Patient is calling regarding medication disulfiram (ANTABUSE) 250 MG Tab . Patient states that all pharmacy are out of medication and patient is requesting an alternative. Patient states that he has been without for a couple weeks.

## 2024-12-16 DIAGNOSIS — I10 ESSENTIAL HYPERTENSION: Chronic | ICD-10-CM

## 2024-12-16 DIAGNOSIS — F41.9 ANXIETY: Chronic | ICD-10-CM

## 2024-12-16 RX ORDER — FUROSEMIDE 20 MG/1
20 TABLET ORAL DAILY
Qty: 90 TABLET | Refills: 1 | Status: SHIPPED | OUTPATIENT
Start: 2024-12-16

## 2024-12-16 RX ORDER — DISULFIRAM 250 MG/1
250 TABLET ORAL DAILY
Qty: 30 TABLET | Refills: 5 | Status: SHIPPED | OUTPATIENT
Start: 2024-12-16 | End: 2024-12-24 | Stop reason: SDUPTHER

## 2024-12-16 RX ORDER — AMLODIPINE BESYLATE 10 MG/1
10 TABLET ORAL EVERY EVENING
Qty: 90 TABLET | Refills: 1 | Status: SHIPPED | OUTPATIENT
Start: 2024-12-16

## 2024-12-22 ENCOUNTER — PATIENT MESSAGE (OUTPATIENT)
Dept: MEDICAL GROUP | Facility: PHYSICIAN GROUP | Age: 59
End: 2024-12-22
Payer: COMMERCIAL

## 2024-12-24 DIAGNOSIS — I10 ESSENTIAL HYPERTENSION: Chronic | ICD-10-CM

## 2024-12-24 RX ORDER — DISULFIRAM 250 MG/1
250 TABLET ORAL DAILY
Qty: 90 TABLET | Refills: 1 | Status: SHIPPED | OUTPATIENT
Start: 2024-12-24

## 2024-12-24 RX ORDER — DISULFIRAM 250 MG/1
250 TABLET ORAL DAILY
Qty: 90 TABLET | Refills: 2 | Status: CANCELLED | OUTPATIENT
Start: 2024-12-24

## 2024-12-24 RX ORDER — METOPROLOL SUCCINATE 50 MG/1
50 TABLET, EXTENDED RELEASE ORAL EVERY EVENING
Qty: 90 TABLET | Refills: 1 | Status: SHIPPED | OUTPATIENT
Start: 2024-12-24

## 2024-12-24 NOTE — TELEPHONE ENCOUNTER
Received request via: Patient    Was the patient seen in the last year in this department? Yes    Does the patient have an active prescription (recently filled or refills available) for medication(s) requested? Yes. Pharmacy change    Pharmacy Name:   [facility] - [place], [place] - [facility] [address]  [address] [address]  Phone: [phone] Fax: [phone]        Does the patient have MCFP Plus and need 100-day supply? (This applies to ALL medications) Patient does not have SCP

## 2025-01-02 DIAGNOSIS — F41.9 ANXIETY: Chronic | ICD-10-CM

## 2025-01-02 DIAGNOSIS — I10 ESSENTIAL HYPERTENSION: Chronic | ICD-10-CM

## 2025-01-03 NOTE — TELEPHONE ENCOUNTER
Received request via: Pharmacy    Was the patient seen in the last year in this department? Yes    Does the patient have an active prescription (recently filled or refills available) for medication(s) requested? Yes. Pharmacy change    Pharmacy Name:     [facility] - [place], [place] - [address]  [address] [address]  Phone: [phone] Fax: [phone]      Does the patient have halfway Plus and need 100-day supply? (This applies to ALL medications) Patient does not have SCP

## 2025-01-05 RX ORDER — AMLODIPINE BESYLATE 10 MG/1
10 TABLET ORAL EVERY EVENING
Qty: 90 TABLET | Refills: 1 | Status: SHIPPED | OUTPATIENT
Start: 2025-01-05

## 2025-01-22 RX ORDER — FUROSEMIDE 20 MG/1
20 TABLET ORAL DAILY
Qty: 90 TABLET | Refills: 1 | Status: SHIPPED | OUTPATIENT
Start: 2025-01-22

## 2025-01-23 NOTE — TELEPHONE ENCOUNTER
Received request via: Pharmacy    Was the patient seen in the last year in this department? Yes    Does the patient have an active prescription (recently filled or refills available) for medication(s) requested? Yes. Pharmacy change    Pharmacy Name:     [facility] - [place], [place] - [address]  [address] [address]  Phone: [phone] Fax: [phone]      Does the patient have USP Plus and need 100-day supply? (This applies to ALL medications) Patient does not have SCP  
flank pain

## 2025-03-05 RX ORDER — DISULFIRAM 250 MG/1
250 TABLET ORAL DAILY
Qty: 90 TABLET | Refills: 0 | Status: SHIPPED | OUTPATIENT
Start: 2025-03-05

## 2025-03-05 NOTE — TELEPHONE ENCOUNTER
Received request via: Pharmacy    Was the patient seen in the last year in this department? Yes    Does the patient have an active prescription (recently filled or refills available) for medication(s) requested? No    Pharmacy Name: [facility] - [address]     Does the patient have longterm Plus and need 100-day supply? (This applies to ALL medications) Patient does not have SCP

## 2025-04-23 ENCOUNTER — TELEPHONE (OUTPATIENT)
Dept: MEDICAL GROUP | Facility: PHYSICIAN GROUP | Age: 60
End: 2025-04-23
Payer: COMMERCIAL

## 2025-04-23 DIAGNOSIS — Z12.11 COLON CANCER SCREENING: ICD-10-CM

## 2025-04-23 NOTE — TELEPHONE ENCOUNTER
VOICEMAIL  1. Caller Name: Sanjeev Charles                        Call Back Number: [phone]    2. Message: pt is asking for a referral for a colonoscopy    3. Patient approves office to leave a detailed voicemail/MyChart message: N\A

## 2025-04-30 NOTE — Clinical Note
REFERRAL APPROVAL NOTICE         Sent on April 30, 2025                   Salo Charles  [address]  [place] [address]                   Dear Mr. Charles,    After a careful review of the medical information and benefit coverage, [facility] has processed your referral. See below for additional details.    If applicable, you must be actively enrolled with your insurance for coverage of the authorized service. If you have any questions regarding your coverage, please contact your insurance directly.    REFERRAL INFORMATION   Referral #:  [id number]  Referred-To Provider    Referred-By Provider:  Gastroenterology    Feliciano Vasquez M.D.   [facility]      [address] [place] [address] [address]  [facility] [address]  [phone] [address] [facility] [address]  [phone]    Referral Start Date:  04/23/2025  Referral End Date:   04/23/2026             SCHEDULING  If you do not already have an appointment, please call [phone] to make an appointment.     MORE INFORMATION  If you do not already have a [employer] account, sign up at: [email or web address]  You can access your medical information, make appointments, see lab results, billing information, and more.  If you have questions regarding this referral, please contact  the [facility] Referrals department at:             [phone]. Monday - Friday 8:00AM - 5:00PM.     Sincerely,    [facility]

## 2025-06-25 DIAGNOSIS — F41.9 ANXIETY: Chronic | ICD-10-CM

## 2025-06-25 DIAGNOSIS — I10 ESSENTIAL HYPERTENSION: Chronic | ICD-10-CM

## 2025-06-25 RX ORDER — METOPROLOL SUCCINATE 50 MG/1
50 TABLET, EXTENDED RELEASE ORAL EVERY EVENING
Qty: 60 TABLET | Refills: 0 | Status: SHIPPED | OUTPATIENT
Start: 2025-06-25

## 2025-06-25 RX ORDER — AMLODIPINE BESYLATE 10 MG/1
10 TABLET ORAL EVERY EVENING
Qty: 60 TABLET | Refills: 0 | Status: SHIPPED | OUTPATIENT
Start: 2025-06-25

## 2025-06-25 RX ORDER — FUROSEMIDE 20 MG/1
20 TABLET ORAL DAILY
Qty: 60 TABLET | Refills: 0 | Status: SHIPPED | OUTPATIENT
Start: 2025-06-25

## 2025-06-25 NOTE — TELEPHONE ENCOUNTER
Received request via: Pharmacy    Was the patient seen in the last year in this department? No    Does the patient have an active prescription (recently filled or refills available) for medication(s) requested? No    Pharmacy Name:   [facility] [address] [place] [address] - [address] [facility]  [address] [facility] [address]  Phone: [phone] Fax: [phone]    [facility] [address]  [address] [address]  Phone: [phone] Fax: [phone]    Does the patient have FDC Plus and need 100-day supply? (This applies to ALL medications) Patient does not have SCP

## (undated) DEVICE — PAD PREP 24 X 48 CUFFED - (100/CA)

## (undated) DEVICE — DERMABOND ADVANCED - (12EA/BX)

## (undated) DEVICE — GLOVE BIOGEL PI INDICATOR SZ 8.0 SURGICAL PF LF -(50/BX 4BX/CA)

## (undated) DEVICE — KIT DRAPE RIO ONE PIECE WITH POCKETS (1EA)

## (undated) DEVICE — BLADE SAGITTAL SAW DUAL CUT 25.0 X 90.0 X 1.27MM (1/EA)

## (undated) DEVICE — GLOVE BIOGEL SZ 8 SURGICAL PF LTX - (50PR/BX 4BX/CA)

## (undated) DEVICE — DRAPE LARGE 3 QUARTER - (20/CA)

## (undated) DEVICE — SET EXTENSION WITH 2 PORTS (48EA/CA) ***PART #2C8610 IS A SUBSTITUTE*****

## (undated) DEVICE — STOCKINETTE IMPERVIOUS 12X48 - STERILELF (10/CA)"

## (undated) DEVICE — SODIUM CHL IRRIGATION 0.9% 1000ML (12EA/CA)

## (undated) DEVICE — Device

## (undated) DEVICE — TIP INTPLS HFLO ML ORFC BTRY - (12/CS)  FOR SURGILAV

## (undated) DEVICE — TUBING CLEARLINK DUO-VENT - C-FLO (48EA/CA)

## (undated) DEVICE — SUCTION INSTRUMENT YANKAUER BULBOUS TIP W/O VENT (50EA/CA)

## (undated) DEVICE — BLADE 90X18X1.27MM SAW SAGITTAL

## (undated) DEVICE — CANISTER SUCTION RIGID RED 1500CC (40EA/CA)

## (undated) DEVICE — GLOVE BIOGEL PI ORTHO SZ 7.5 PF LF (40PR/BX)

## (undated) DEVICE — LACTATED RINGERS INJ 1000 ML - (14EA/CA 60CA/PF)

## (undated) DEVICE — HUMID-VENT HEAT AND MOISTURE EXCHANGE- (50/BX)

## (undated) DEVICE — SUTURE 2-0 MONOCRYL PLUS UNDYED CT-1 1 X 36 (36EA/BX)"

## (undated) DEVICE — PIN FIXATION BONE STERILE 3.2MM X 110MM (2EA/PK)

## (undated) DEVICE — TIP INTPLS HFLO ML ORFC BTRY - (12/CS) FOR SURGILAV

## (undated) DEVICE — DRESSING STERILE BURN ACTICOAT FLEX 7 (50EA/CA)

## (undated) DEVICE — PIN FIXATION BONE STERILE 3.2MM X 140MM (2EA/PK)

## (undated) DEVICE — SYSTEM NAVIGATION VIZADISC KNEE PROCEDURE TRACKING KIT (1EA)

## (undated) DEVICE — SENSOR OXIMETER ADULT SPO2 RD SET (20EA/BX)

## (undated) DEVICE — HANDPIECE 10FT INTPLS SCT PLS IRRIGATION HAND CONTROL SET (6/PK)

## (undated) DEVICE — SUTURE GENERAL

## (undated) DEVICE — SUTURE 5 ETHIBOND V-37 (12PK/BX)

## (undated) DEVICE — SUTURE 3-0 MONOCRYL PLUS PS-1 - 27 INCH (36/BX)

## (undated) DEVICE — BLADE SAGITTAL 34MM